# Patient Record
Sex: FEMALE | Race: WHITE | NOT HISPANIC OR LATINO | Employment: OTHER | URBAN - METROPOLITAN AREA
[De-identification: names, ages, dates, MRNs, and addresses within clinical notes are randomized per-mention and may not be internally consistent; named-entity substitution may affect disease eponyms.]

---

## 2017-01-04 ENCOUNTER — TELEPHONE (OUTPATIENT)
Dept: OBGYN | Facility: CLINIC | Age: 72
End: 2017-01-04

## 2017-01-04 NOTE — TELEPHONE ENCOUNTER
Panel Management Review      Patient has the following on her problem list: None      Composite cancer screening  Chart review shows that this patient is due/due soon for the following Mammogram  Summary:    Patient is due/failing the following:   MAMMOGRAM    Action needed:   mammogram    Type of outreach:    Sent letter.    Questions for provider review:    None                                                                                                                                    Sandra Kenney

## 2017-01-10 DIAGNOSIS — G62.9 NEUROPATHY: Primary | ICD-10-CM

## 2017-01-10 DIAGNOSIS — E78.5 HYPERLIPIDEMIA LDL GOAL <130: ICD-10-CM

## 2017-01-11 NOTE — TELEPHONE ENCOUNTER
Routing refill request to provider for review/approval because:  Labs not current:  Last K+ and Cr = July of 2015.      Tino Claros [facility] Pharmacist on behalf of: [facility]

## 2017-01-11 NOTE — TELEPHONE ENCOUNTER
hctz       Last Written Prescription Date: 7-21-16  Last Fill Quantity: 90, # refills: 1  Last Office Visit with [facility], [facility] or [facility] prescribing provider: 9-21-16       POTASSIUM   Date Value Ref Range Status   07/31/2015 4.0 3.4 - 5.3 mmol/L Final     CREATININE   Date Value Ref Range Status   07/31/2015 0.78 0.52 - 1.04 mg/dL Final     BP Readings from Last 3 Encounters:   10/11/16 118/65   09/21/16 112/62   06/20/16 116/65     Atenolol     Last Written Prescription Date: 7-21-16  Last Fill Quantity: 90  # refills: 1    Last Office Visit with [facility], [facility] or [facility] prescribing provider:  9-21-16   Future Office Visit:        BP Readings from Last 3 Encounters:   10/11/16 118/65   09/21/16 112/62   06/20/16 116/65

## 2017-01-13 RX ORDER — ATENOLOL 25 MG/1
25 TABLET ORAL DAILY
Qty: 90 TABLET | Refills: 0 | Status: SHIPPED | OUTPATIENT
Start: 2017-01-13 | End: 2017-03-27

## 2017-01-13 RX ORDER — HYDROCHLOROTHIAZIDE 25 MG/1
25 TABLET ORAL DAILY
Qty: 90 TABLET | Refills: 0 | Status: SHIPPED | OUTPATIENT
Start: 2017-01-13 | End: 2017-05-09

## 2017-02-09 ENCOUNTER — TELEPHONE (OUTPATIENT)
Dept: NEUROLOGY | Facility: CLINIC | Age: 72
End: 2017-02-09

## 2017-02-09 NOTE — TELEPHONE ENCOUNTER
Reason for Call:  Other prescription    Detailed comments: pt stating she has been taking the gabapentin, she would like to increase the medication has her neuropathy is acting up the last 2 weeks. She has warm feet today and especially at night.     Phone Number Patient can be reached at: Home number on file [phone] (home)    Best Time: any     Can we leave a detailed message on this number? YES    Call taken on 2/9/2017 at 10:27 AM by Apryl Grant

## 2017-02-09 NOTE — TELEPHONE ENCOUNTER
Advised follow up is overdue so assisted in scheduling appt to follow up on current worsening Neuropathy status.  Dorcas Sneed RN  [facility]

## 2017-02-24 ENCOUNTER — TELEPHONE (OUTPATIENT)
Dept: FAMILY MEDICINE | Facility: CLINIC | Age: 72
End: 2017-02-24

## 2017-02-24 NOTE — TELEPHONE ENCOUNTER
.  Panel Management Review      Patient has the following on her problem list:       IVD   ASA: Passed    Last LDL:    Lab Results   Component Value Date    CHOL 173 01/25/2016     Lab Results   Component Value Date    HDL 60 01/25/2016     Lab Results   Component Value Date    LDL 90 01/25/2016     Lab Results   Component Value Date    TRIG 117 01/25/2016      No results found for: CHOLHDLRATIO     Is the patient on a Statin? YES   Is the patient on Aspirin? YES                  Medications     HMG CoA Reductase Inhibitors    atorvastatin (LIPITOR) 40 MG tablet    Salicylates    aspirin 81 MG tablet          Last three blood pressure readings:  BP Readings from Last 3 Encounters:   10/11/16 118/65   09/21/16 112/62   06/20/16 116/65        Tobacco History:     History   Smoking Status     Former Smoker     Packs/day: 0.50     Types: Cigarettes     Quit date: 7/4/2007   Smokeless Tobacco     Never Used       Hypertension   Last three blood pressure readings:  BP Readings from Last 3 Encounters:   10/11/16 118/65   09/21/16 112/62   06/20/16 116/65     Blood pressure: Passed    HTN Guidelines:  Age 18-59 BP range:  Less than 140/90  Age 60-85 with Diabetes:  Less than 140/90  Age 60-85 without Diabetes:  less than 150/90      Composite cancer screening  Chart review shows that this patient is due/due soon for the following Colonoscopy and Fecal Colorectal (FIT) - colonoscopy was refused 7/31/15  Summary:    Patient is due/failing the following:   Colon/FIT, pneumovax    Action needed:   Patient needs nurse only appointment. and Patient needs referral/order: colonoscopy    Type of outreach:    Sent letter.    Questions for provider review:    None                                                                                                                                    Ya VARGHESE       Chart routed to none .

## 2017-02-24 NOTE — LETTER
[facility]  [address] [address]  [phone]      February 24, 2017      Abi Lees  [address] [address]        Dear Abi,     As part of [place]'s commitment to health and wellness we have recently reviewed your chart and your medical record indicates that you are due for one or more of the following:    -- Pneumonia vaccine. This is either recommended for your age group or due to a chronic illness that you may have. To receive this vaccine please call to schedule a nurse appointment or, if it is more convenient for you, our pharmacy takes walk ins for this also.     -- Colonoscopy or FIT test. Please call one of the following numbers to schedule a colonoscopy:  [facility] [phone]  [facility] [phone]  [facility] [phone]  [facility] [phone] (multiple sites, call for locations)    A colonoscopy is the gold standard but if you are reluctant to do this there is a less invasive and less expensive alternative. FIT (fecal immunochemical testing) is a screening option that is performed on a yearly basis. This is a test to check for blood in the stool, which can be performed in the comfort of your own home. If you are willing to perform this test, we can order the kit for you to  at our lab. When you have completed the test, you simply mail it in the pre-addressed envelope.    Please call us at [phone] if you need an order for a colonoscopy or FIT testing.    Please try to schedule and/or complete the tests above within the next 2-4 weeks.   The number to call to schedule an appointment at [facility] is [phone].    While we work hard to maintain accurate records on all our patients, it is always possible that this notice does not accurately reflect tests that you may have had. To ensure that we do not continue to send you notices please verify, at your next visit or by a [employer] message,  that we have accurate dates of your tests, even if these were done many years ago.                 Working together for your health is our goal.    Thank you for choosing [facility] for your healthcare needs.      Sincerely,     MELODIE Sarmiento / adam

## 2017-02-24 NOTE — LETTER
[facility]  [address] [place] [address]  [phone]      February 24, 2017      Abi Lees  [address] [address]        Dear Abi,     As part of [place]'s commitment to health and wellness we have recently reviewed your chart and your medical record indicates that you are due for one or more of the following:    -- Pneumonia vaccine. This is either recommended for your age group or due to a chronic illness that you may have. To receive this vaccine please call to schedule a nurse appointment or, if it is more convenient for you, our pharmacy takes walk ins for this also.     Please try to schedule and/or complete the tests above within the next 2-4 weeks.   The number to call to schedule an appointment at [facility] is [phone].    While we work hard to maintain accurate records on all our patients, it is always possible that this notice does not accurately reflect tests that you may have had. To ensure that we do not continue to send you notices please verify, at your next visit or by a [employer] message, that we have accurate dates of your tests, even if these were done many years ago.     Working together for your health is our goal.    Thank you for choosing [facility] for your healthcare needs.      Sincerely,     MELODIE Sarmiento / adam

## 2017-03-27 ENCOUNTER — OFFICE VISIT (OUTPATIENT)
Dept: NEUROLOGY | Facility: CLINIC | Age: 72
End: 2017-03-27
Payer: COMMERCIAL

## 2017-03-27 VITALS
HEIGHT: 64 IN | BODY MASS INDEX: 25.71 KG/M2 | SYSTOLIC BLOOD PRESSURE: 124 MMHG | WEIGHT: 150.6 LBS | DIASTOLIC BLOOD PRESSURE: 71 MMHG | HEART RATE: 66 BPM

## 2017-03-27 DIAGNOSIS — E78.5 HYPERLIPIDEMIA LDL GOAL <130: ICD-10-CM

## 2017-03-27 DIAGNOSIS — G62.9 SENSORY NEUROPATHY: ICD-10-CM

## 2017-03-27 DIAGNOSIS — Z79.899 ENCOUNTER FOR LONG-TERM (CURRENT) USE OF MEDICATIONS: Primary | ICD-10-CM

## 2017-03-27 DIAGNOSIS — G62.9 NEUROPATHY: ICD-10-CM

## 2017-03-27 PROCEDURE — 99214 OFFICE O/P EST MOD 30 MIN: CPT | Performed by: PSYCHIATRY & NEUROLOGY

## 2017-03-27 RX ORDER — GABAPENTIN 300 MG/1
CAPSULE ORAL
Qty: 120 CAPSULE | Refills: 5 | Status: SHIPPED | OUTPATIENT
Start: 2017-03-27 | End: 2017-10-06

## 2017-03-27 NOTE — PROGRESS NOTES
ESTABLISHED PATIENT NEUROLOGY NOTE    DATE OF VISIT: 3/27/2017  MRN: [id number]  PATIENT NAME: Abi Lees  YOB: 1945    No chief complaint on file.    SUBJECTIVE:                                                      HISTORY OF PRESENT ILLNESS:  Abi is here for follow up regarding several-year history of burning/tingling in the feet and legs. She also complained of occasional tingling in the Right hand. 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 The patient had not been wearing her wrist splint regularly at night so she was instructed to do so.    At our previous visit, the patient was complaining of new back pain so I ordered a lumbar MRI, which has not been completed. Electromyogram showed no evidence of radiculopathy or large fiber neuropathy. She did note benefit from a pain standpoint on the gabapentin.     There is history of elevated ANASTASIA and unremarkable work-up for possible erythromelalgia.     Today the patient returns to clinic to discuss symptom management. She has been having pain and redness return in the feet during the afternoons primarily. She does feel that the gabapentin continues to be helpful, but it seems to wear off later in the day. She continues to be active with walking. She has not noticed any weakness. She has a fan on the feet at night and sometimes takes ibuprofen which also helps. She denies side effects from the gabapentin. She does not have history of kidney problems.     She does have occasional tingling in the hands (for instance, if she is holding the telephone in one position for too long). She denies pain but does have some new trouble with twisting/gripping motions due to arthritis in the hands and fingers. She has not been wearing the wrist splint at night for months because she does not feel that she needs it.    CURRENT MEDICATIONS:     Current Outpatient Prescriptions on File Prior to Visit:  hydrochlorothiazide (HYDRODIURIL) 25 MG tablet Take 1 tablet  (25 mg) by mouth daily Due for appointment before next refill and labs   atenolol (TENORMIN) 25 MG tablet Take 1 tablet (25 mg) by mouth daily Due for appointment before next refill and labs   gabapentin (NEURONTIN) 300 MG capsule Take 1 capsule (300 mg) by mouth 3 times daily   lisinopril (PRINIVIL,ZESTRIL) 40 MG tablet Take 1 tablet (40 mg) by mouth daily   atorvastatin (LIPITOR) 40 MG tablet Take 1 tablet (40 mg) by mouth daily   order for DME Equipment being ordered: Right wrist splint (carpal tunnel syndrome)   aspirin 81 MG tablet Take  by mouth daily.     No current facility-administered medications on file prior to visit.       REVIEW OF SYSTEMS:                                                      10-point review of systems is negative except as mentioned above in HPI.     EXAM:                                                      Physical Exam:   Vitals: There were no vitals taken for this visit.  BMI= There is no height or weight on file to calculate BMI.  GENERAL: NAD.   Neurologic:  MENTAL STATUS: Alert, attentive. Speech is fluent. Normal comprehension. Normal concentration. Adequate fund of knowledge.   CRANIAL NERVES: Pupils equally, round and reactive to light. Facial movement normal. EOM full. Hearing intact to conversation. Trapezius strength intact. Palate moves symmetrically. Tongue midline.  MOTOR: 5/5 in proximal and distal muscle groups of upper extremities with focused testing. Tone and bulk normal. Negative Tinel test and Phalen maneuver.  SENSATION: Normal light touch and pinprick in the fingers. Intact proprioception. Vibration: Slightly decreased at both ankles.   DTRs: Brisk at patellae. Absent ankle jerks.  COORDINATION: Normal fine motor movements.  STATION AND GAIT: Slightly antalgic.  CV: RRR. S1, S2.   NECK: No bruits.  EXTR: Feet are warm, red.       ASSESSMENT and PLAN:                                                      Assessment and Plan:  No diagnosis found.     Ms. Lees is  a pleasant 70 yo woman with chronic problems with paresthesias and pain in the LEs. There has been question of whether her symptoms are due to erythromelalgia vs small fiber neuropathy. She has responded to gabapentin in the past but has now been experiencing some breakthrough pain and discomfort in the afternoons. We discussed increasing the dose to see if we can maximize benefit. I also mentioned that if this is not effective, we may have to try either an adjunctive or alternative medication. The patient understands and agrees with the plan.     I am curious about her reflexes and think that there may also be underlying myelopathy, though she is not symptomatic. She prefers to hold off on lumbar imaging for now.     Patient Instructions:  Increase the afternoon gabapentin dose to 600mg, as discussed (continue 300mg in the morning and 300mg in the evening). It is okay to increase the other doses as well, as needed. Let me know if you find the need to make further changes.   Labs: BMP (for kidney function, at your leisure).   Return to clinic in 6 months, or sooner if new concerns arise.    Total Time: 25 minutes were spent with the patient. More than 50% of the time spent on counseling (as described above in Assessment and Plan) /coordinating the care.    Salma Stovall MD  Neurology

## 2017-03-27 NOTE — MR AVS SNAPSHOT
After Visit Summary   3/27/2017    Abi Lees    MRN: [id number]           Patient Information     Date Of Birth          1945        Visit Information        Provider Department      3/27/2017 3:00 PM Salma Stovall MD [facility]        Today's Diagnoses     Encounter for long-term (current) use of medications    -  1    Sensory neuropathy (H)          Care Instructions    Plan:    Increase the afternoon gabapentin dose to 600mg, as discussed (continue 300mg in the morning and 300mg in the evening). It is okay to increase the other doses as well, as needed. Let me know if you find the need to make further changes.   Labs: BMP (for kidney function, at your leisure).   Return to clinic in 6 months, or sooner if new concerns arise.        Follow-ups after your visit        Follow-up notes from your care team     Return in about 6 months (around 9/27/2017).      Future tests that were ordered for you today     Open Future Orders        Priority Expected Expires Ordered    Basic metabolic panel Routine  3/27/2018 3/27/2017            Who to contact     If you have questions or need follow up information about today's clinic visit or your schedule please contact [facility] directly at [phone].  Normal or non-critical lab and imaging results will be communicated to you by MyChart, letter or phone within 4 business days after the clinic has received the results. If you do not hear from us within 7 days, please contact the clinic through [employer] or phone. If you have a critical or abnormal lab result, we will notify you by phone as soon as possible.  Submit refill requests through [employer] or call your pharmacy and they will forward the refill request to us. Please allow 3 business days for your refill to be completed.          Additional Information About Your Visit        [employer] Information     [employer] lets you send messages to your doctor, view your test results,  "renew your prescriptions, schedule appointments and more. To sign up, go to [email or web address] . Click on \"Log in\" on the left side of the screen, which will take you to the Welcome page. Then click on \"Sign up Now\" on the right side of the page.     You will be asked to enter the access code listed below, as well as some personal information. Please follow the directions to create your username and password.     Your access code is: [email or web address]  Expires: 2017  3:25 PM     Your access code will  in 90 days. If you need help or a new code, please call your [place] clinic or [phone].        Care EveryWhere ID     This is your Care EveryWhere ID. This could be used by other organizations to access your [place] medical records  [id number]        Your Vitals Were     Pulse Height BMI (Body Mass Index)             66 5' 3.5\" (1.613 m) 26.26 kg/m2          Blood Pressure from Last 3 Encounters:   17 124/71   10/11/16 118/65   16 112/62    Weight from Last 3 Encounters:   17 150 lb 9.6 oz (68.3 kg)   10/11/16 150 lb 12.8 oz (68.4 kg)   16 151 lb 3.2 oz (68.6 kg)                 Today's Medication Changes          These changes are accurate as of: 3/27/17  3:25 PM.  If you have any questions, ask your nurse or doctor.               These medicines have changed or have updated prescriptions.        Dose/Directions    gabapentin 300 MG capsule   Commonly known as:  NEURONTIN   This may have changed:    - how much to take  - how to take this  - when to take this  - additional instructions   Used for:  Sensory neuropathy (H)   Changed by:  Salma Stovall MD        300mg Qam, 600mg at 2pm and 300mg QHS.   Quantity:  120 capsule   Refills:  5            Where to get your medicines      These medications were sent to [facility] [address]  [address] [facility] [address]     Phone:  [phone]     gabapentin 300 MG capsule    "             Primary Care Provider Office Phone # Fax #    Abigail Mishra EMORY [facility] [phone]       [facility] [address] [place] [address]        Thank you!     Thank you for choosing [facility]  for your care. Our goal is always to provide you with excellent care. Hearing back from our patients is one way we can continue to improve our services. Please take a few minutes to complete the written survey that you may receive in the mail after your visit with us. Thank you!             Your Updated Medication List - Protect others around you: Learn how to safely use, store and throw away your medicines at www.disposemymeds.org.          This list is accurate as of: 3/27/17  3:25 PM.  Always use your most recent med list.                   Brand Name Dispense Instructions for use    aspirin 81 MG tablet      Take  by mouth daily.       atenolol 25 MG tablet    TENORMIN    90 tablet    Take 1 tablet (25 mg) by mouth daily Due for appointment before next refill and labs       atorvastatin 40 MG tablet    LIPITOR    90 tablet    Take 1 tablet (40 mg) by mouth daily       gabapentin 300 MG capsule    NEURONTIN    120 capsule    300mg Qam, 600mg at 2pm and 300mg QHS.       hydrochlorothiazide 25 MG tablet    HYDRODIURIL    90 tablet    Take 1 tablet (25 mg) by mouth daily Due for appointment before next refill and labs       lisinopril 40 MG tablet    PRINIVIL/ZESTRIL    90 tablet    Take 1 tablet (40 mg) by mouth daily       order for DME     1 Units    Equipment being ordered: Right wrist splint (carpal tunnel syndrome)

## 2017-03-27 NOTE — TELEPHONE ENCOUNTER
Routing refill request to provider for review/approval because:  Drug not on the [facility] refill protocol. Due for appt and labs per last refill in January.     Kaylee Rivera, Pharm.D.  on behalf of [facility] Pharmacist   [email or web address]

## 2017-03-27 NOTE — PATIENT INSTRUCTIONS
Plan:    Increase the afternoon gabapentin dose to 600mg, as discussed (continue 300mg in the morning and 300mg in the evening). It is okay to increase the other doses as well, as needed. Let me know if you find the need to make further changes.   Labs: BMP (for kidney function, at your leisure).   Return to clinic in 6 months, or sooner if new concerns arise.

## 2017-03-27 NOTE — TELEPHONE ENCOUNTER
atorvastatin  Last Written Prescription Date: 7-  Last Fill Quantity: 90, # refills: 2    Last Office Visit with [facility] or [facility] prescribing provider:  3-   Future Office Visit:      Cholesterol   Date Value Ref Range Status   01/25/2016 173 <200 mg/dL Final     HDL Cholesterol   Date Value Ref Range Status   01/25/2016 60 >49 mg/dL Final     LDL Cholesterol Calculated   Date Value Ref Range Status   01/25/2016 90 <100 mg/dL Final     Comment:     Desirable:       <100 mg/dl     Triglycerides   Date Value Ref Range Status   01/25/2016 117 <150 mg/dL Final

## 2017-03-27 NOTE — NURSING NOTE
"Initial /71  Pulse 66  Ht 5' 3.5\" (1.613 m)  Wt 150 lb 9.6 oz (68.3 kg)  BMI 26.26 kg/m2 Estimated body mass index is 26.26 kg/(m^2) as calculated from the following:    Height as of this encounter: 5' 3.5\" (1.613 m).    Weight as of this encounter: 150 lb 9.6 oz (68.3 kg). .      "

## 2017-03-27 NOTE — TELEPHONE ENCOUNTER
Atenolol 25mg      Last Written Prescription Date: 01/13/17  Last Fill Quantity: 90, # refills: 0    Last Office Visit with [facility] or [facility] prescribing provider:  09/21/16   Future Office Visit:        BP Readings from Last 3 Encounters:   03/27/17 124/71   10/11/16 118/65   09/21/16 112/62     Thank you -  Jessica Breen, Pharmacy Technician  [facility]  On Behalf Of [facility]

## 2017-03-28 RX ORDER — ATENOLOL 25 MG/1
25 TABLET ORAL DAILY
Qty: 90 TABLET | Refills: 0 | Status: SHIPPED | OUTPATIENT
Start: 2017-03-28 | End: 2017-07-10

## 2017-03-28 RX ORDER — ATORVASTATIN CALCIUM 40 MG/1
40 TABLET, FILM COATED ORAL DAILY
Qty: 30 TABLET | Refills: 0 | Status: SHIPPED | OUTPATIENT
Start: 2017-03-28 | End: 2017-05-09

## 2017-04-06 DIAGNOSIS — Z79.899 ENCOUNTER FOR LONG-TERM (CURRENT) USE OF MEDICATIONS: ICD-10-CM

## 2017-04-06 DIAGNOSIS — E78.5 HYPERLIPIDEMIA LDL GOAL <130: ICD-10-CM

## 2017-04-06 LAB
ANION GAP SERPL CALCULATED.3IONS-SCNC: 9 MMOL/L (ref 3–14)
BUN SERPL-MCNC: 17 MG/DL (ref 7–30)
CALCIUM SERPL-MCNC: 9.3 MG/DL (ref 8.5–10.1)
CHLORIDE SERPL-SCNC: 101 MMOL/L (ref 94–109)
CHOLEST SERPL-MCNC: 153 MG/DL
CO2 SERPL-SCNC: 28 MMOL/L (ref 20–32)
CREAT SERPL-MCNC: 0.8 MG/DL (ref 0.52–1.04)
GFR SERPL CREATININE-BSD FRML MDRD: 71 ML/MIN/1.7M2
GLUCOSE SERPL-MCNC: 93 MG/DL (ref 70–99)
HDLC SERPL-MCNC: 54 MG/DL
LDLC SERPL CALC-MCNC: 73 MG/DL
NONHDLC SERPL-MCNC: 99 MG/DL
POTASSIUM SERPL-SCNC: 3.9 MMOL/L (ref 3.4–5.3)
SODIUM SERPL-SCNC: 138 MMOL/L (ref 133–144)
TRIGL SERPL-MCNC: 132 MG/DL

## 2017-04-06 PROCEDURE — 80061 LIPID PANEL: CPT | Performed by: PSYCHIATRY & NEUROLOGY

## 2017-04-06 PROCEDURE — 80048 BASIC METABOLIC PNL TOTAL CA: CPT | Performed by: PSYCHIATRY & NEUROLOGY

## 2017-04-06 PROCEDURE — 36415 COLL VENOUS BLD VENIPUNCTURE: CPT | Performed by: PSYCHIATRY & NEUROLOGY

## 2017-04-06 NOTE — LETTER
[facility]  [address] [address]  Phone: [phone]      April 7, 2017      Abi Kunde  [address] [address]          Dear Ms. Lees,    The results of your recent lab tests were within normal limits. Enclosed is a copy of these results.  Results for orders placed or performed in visit on 04/06/17   Basic metabolic panel   Result Value Ref Range    Sodium 138 133 - 144 mmol/L    Potassium 3.9 3.4 - 5.3 mmol/L    Chloride 101 94 - 109 mmol/L    Carbon Dioxide 28 20 - 32 mmol/L    Anion Gap 9 3 - 14 mmol/L    Glucose 93 70 - 99 mg/dL    Urea Nitrogen 17 7 - 30 mg/dL    Creatinine 0.80 0.52 - 1.04 mg/dL    GFR Estimate 71 >60 mL/min/1.7m2    GFR Estimate If Black 86 >60 mL/min/1.7m2    Calcium 9.3 8.5 - 10.1 mg/dL   Lipid Profile with reflex to direct LDL   Result Value Ref Range    Cholesterol 153 <200 mg/dL    Triglycerides 132 <150 mg/dL    HDL Cholesterol 54 >49 mg/dL    LDL Cholesterol Calculated 73 <100 mg/dL    Non HDL Cholesterol 99 <130 mg/dL     If you have any further questions or problems, please contact our office.    Sincerely,      MELODIE Sarmiento/ marc

## 2017-04-07 NOTE — PROGRESS NOTES
Please advise Abi Lees,  1945, that her lab results were normal.  [phone] (home) NONE (work)    Thank you,  Salma Stovall

## 2017-05-09 DIAGNOSIS — E78.5 HYPERLIPIDEMIA LDL GOAL <130: ICD-10-CM

## 2017-05-09 DIAGNOSIS — G62.9 NEUROPATHY: ICD-10-CM

## 2017-05-09 RX ORDER — HYDROCHLOROTHIAZIDE 25 MG/1
25 TABLET ORAL DAILY
Qty: 90 TABLET | Refills: 0 | Status: SHIPPED | OUTPATIENT
Start: 2017-05-09 | End: 2017-07-24

## 2017-05-09 RX ORDER — ATORVASTATIN CALCIUM 40 MG/1
40 TABLET, FILM COATED ORAL DAILY
Qty: 90 TABLET | Refills: 0 | Status: SHIPPED | OUTPATIENT
Start: 2017-05-09 | End: 2017-07-24

## 2017-05-09 NOTE — TELEPHONE ENCOUNTER
Atorvastatin 40mg  Last Written Prescription Date: 3/28/17  Last Fill Quantity: 30, # refills: 0  Last Office Visit with [facility], [facility] or  [facility] prescribing provider: 9/21/16       Lab Results   Component Value Date    CHOL 153 04/06/2017     Lab Results   Component Value Date    HDL 54 04/06/2017     Lab Results   Component Value Date    LDL 73 04/06/2017     Lab Results   Component Value Date    TRIG 132 04/06/2017     No results found for: CHOLHDLRATIO      Hydrochlorothiazide 25mg      Last Written Prescription Date: 1/13/17  Last Fill Quantity: 90, # refills: 0  Last Office Visit with [facility], [facility] or [facility] prescribing provider: 9/21/16       Potassium   Date Value Ref Range Status   04/06/2017 3.9 3.4 - 5.3 mmol/L Final     Creatinine   Date Value Ref Range Status   04/06/2017 0.80 0.52 - 1.04 mg/dL Final     BP Readings from Last 3 Encounters:   03/27/17 124/71   10/11/16 118/65   09/21/16 112/62     Vale Ayers CPhT  [facility] [id number] [address] [place] [address]  Phone: [phone]  Fax: [phone]

## 2017-05-09 NOTE — TELEPHONE ENCOUNTER
Prescription approved per [facility] Refill Protocol.  Kaylee Rivera PharmD   [facility]  [email or web address]   [facility] pharmacist on behalf of [facility].

## 2017-07-10 DIAGNOSIS — G62.9 NEUROPATHY: ICD-10-CM

## 2017-07-10 DIAGNOSIS — E78.5 HYPERLIPIDEMIA LDL GOAL <130: ICD-10-CM

## 2017-07-10 NOTE — TELEPHONE ENCOUNTER
Atenolol 25mg      Last Written Prescription Date: 03/28/2017 #90 x 0  Last filled 03/28/2017  Last Office Visit with [facility], [facility] or [facility] prescribing provider:  09/21/2016 ALEENA Stevens   Future Office Visit:   none     BP Readings from Last 3 Encounters:   03/27/17 124/71   10/11/16 118/65   09/21/16 112/62

## 2017-07-11 RX ORDER — ATENOLOL 25 MG/1
TABLET ORAL
Qty: 14 TABLET | Refills: 0 | Status: SHIPPED | OUTPATIENT
Start: 2017-07-11 | End: 2017-07-24

## 2017-07-11 NOTE — TELEPHONE ENCOUNTER
Spoke with patient and will give 2 weeks for refill. She has an appointment on 7/24/17.  Tessa Thompson RN

## 2017-07-11 NOTE — TELEPHONE ENCOUNTER
Pt called looking for a refill on her medication, she has appt set up in two weeks but only has five days left.  Please advise.     Anisa Villatoro, [place]

## 2017-07-19 ENCOUNTER — TELEPHONE (OUTPATIENT)
Dept: OBGYN | Facility: CLINIC | Age: 72
End: 2017-07-19

## 2017-07-19 NOTE — LETTER
July 19, 2017      Abi Lees  [address] [address]    Dear ,      This letter is to remind you that you are due for your follow up mammogram.    Please call [phone] to schedule your appointment at your earliest convenience.     If you have completed the tests outside of [place], please have the results forwarded to our office. We will update the chart for your primary Physician to review before your next annual physical.     Sincerely,      Bj Mackenzie MD

## 2017-07-19 NOTE — TELEPHONE ENCOUNTER
Panel Management Review      Patient has the following on her problem list: None      Composite cancer screening  Chart review shows that this patient is due/due soon for the following Mammogram  Summary:    Patient is due/failing the following:   MAMMOGRAM    Action needed:   Patient needs office visit for mammogram.    Type of outreach:    Sent letter.    Questions for provider review:    None                                                                                                                                    MELINA DO MA       Chart routed to none .

## 2017-07-24 ENCOUNTER — OFFICE VISIT (OUTPATIENT)
Dept: FAMILY MEDICINE | Facility: CLINIC | Age: 72
End: 2017-07-24
Payer: COMMERCIAL

## 2017-07-24 VITALS
WEIGHT: 151 LBS | HEART RATE: 60 BPM | BODY MASS INDEX: 26.33 KG/M2 | TEMPERATURE: 98.4 F | DIASTOLIC BLOOD PRESSURE: 64 MMHG | SYSTOLIC BLOOD PRESSURE: 116 MMHG

## 2017-07-24 DIAGNOSIS — E78.5 HYPERLIPIDEMIA LDL GOAL <130: ICD-10-CM

## 2017-07-24 DIAGNOSIS — I10 ESSENTIAL HYPERTENSION WITH GOAL BLOOD PRESSURE LESS THAN 140/90: ICD-10-CM

## 2017-07-24 DIAGNOSIS — H93.13 BILATERAL TINNITUS: Primary | ICD-10-CM

## 2017-07-24 DIAGNOSIS — G62.9 NEUROPATHY: ICD-10-CM

## 2017-07-24 PROCEDURE — 99214 OFFICE O/P EST MOD 30 MIN: CPT | Performed by: NURSE PRACTITIONER

## 2017-07-24 RX ORDER — ATENOLOL 25 MG/1
TABLET ORAL
Qty: 90 TABLET | Refills: 3 | Status: SHIPPED | OUTPATIENT
Start: 2017-07-24 | End: 2018-07-09

## 2017-07-24 RX ORDER — HYDROCHLOROTHIAZIDE 25 MG/1
25 TABLET ORAL DAILY
Qty: 90 TABLET | Refills: 3 | Status: SHIPPED | OUTPATIENT
Start: 2017-07-24 | End: 2018-07-09

## 2017-07-24 RX ORDER — ATORVASTATIN CALCIUM 40 MG/1
40 TABLET, FILM COATED ORAL DAILY
Qty: 90 TABLET | Refills: 3 | Status: SHIPPED | OUTPATIENT
Start: 2017-07-24 | End: 2018-07-09

## 2017-07-24 NOTE — MR AVS SNAPSHOT
After Visit Summary   7/24/2017    Abi Lees    MRN: [id number]           Patient Information     Date Of Birth          1945        Visit Information        Provider Department      7/24/2017 9:20 AM Abigail Stevens APRN [facility]        Today's Diagnoses     Bilateral tinnitus    -  1    Neuropathy        Hyperlipidemia LDL goal <130        Essential hypertension with goal blood pressure less than 140/90          Care Instructions    Your nose is stuffy . Start to use the neti pot     This will decrease the swelling in the nose  And will ususally decrease any post nasal drainage.     Stay well hydrated - urine should be clear.        For the knee  - consider getting some  Kinesiology tape.    Rock tape or  Ktape  Try doing 1/2 squats while brushing   Getting your thigh muscles strong will help the knees  Check out the [employer]neakers.       If the ringing in the ears doesn't improve.  Get your hearing checked and then see ENT               Follow-ups after your visit        Additional Services     AUDIOLOGY ADULT REFERRAL       Your provider has referred you to: [facility]: [facility] [phone]   [email or web address]  [facility] [phone]   [email or web address]    Specialty Testing:  Audiogram w/Tymps and Reflexes (Comprehensive Audiology Evaluation)            OTOLARYNGOLOGY REFERRAL       Your provider has referred you to: [facility]: [facility] [phone]   [email or web address]  [facility]: [facility] [phone]   [email or web address]    Please be aware that coverage of these services is subject to the terms and limitations of your health insurance plan.  Call member services at your health plan with any benefit or coverage questions.      Please bring the following  "with you to your appointment:    (1) Any X-Rays, CTs or MRIs which have been performed.  Contact the facility where they were done to arrange for  prior to your scheduled appointment.   (2) List of current medications  (3) This referral request   (4) Any documents/labs given to you for this referral                  Who to contact     Normal or non-critical lab and imaging results will be communicated to you by [employer], letter or phone within 4 business days after the clinic has received the results. If you do not hear from us within 7 days, please contact the clinic through [employer] or phone. If you have a critical or abnormal lab result, we will notify you by phone as soon as possible.  Submit refill requests through [employer] or call your pharmacy and they will forward the refill request to us. Please allow 3 business days for your refill to be completed.          If you need to speak with a  for additional information , please call: [phone]           Additional Information About Your Visit        PageFair Information     Page[employer] lets you send messages to your doctor, view your test results, renew your prescriptions, schedule appointments and more. To sign up, go to [email or web address] . Click on \"Log in\" on the left side of the screen, which will take you to the Welcome page. Then click on \"Sign up Now\" on the right side of the page.     You will be asked to enter the access code listed below, as well as some personal information. Please follow the directions to create your username and password.     Your access code is: [email or web address]  Expires: 10/22/2017 10:10 AM     Your access code will  in 90 days. If you need help or a new code, please call your [place] clinic or [phone].        Care EveryWhere ID     This is your Care EveryWhere ID. This could be used by other organizations to access your [place] medical records  [id number]        Your Vitals Were     Pulse " Temperature BMI (Body Mass Index)             60 98.4  F (36.9  C) (Tympanic) 26.33 kg/m2          Blood Pressure from Last 3 Encounters:   07/24/17 116/64   03/27/17 124/71   10/11/16 118/65    Weight from Last 3 Encounters:   07/24/17 151 lb (68.5 kg)   03/27/17 150 lb 9.6 oz (68.3 kg)   10/11/16 150 lb 12.8 oz (68.4 kg)              We Performed the Following     Albumin Random Urine Quantitative     AUDIOLOGY ADULT REFERRAL     OTOLARYNGOLOGY REFERRAL          Today's Medication Changes          These changes are accurate as of: 7/24/17 10:10 AM.  If you have any questions, ask your nurse or doctor.               These medicines have changed or have updated prescriptions.        Dose/Directions    atenolol 25 MG tablet   Commonly known as:  TENORMIN   This may have changed:  See the new instructions.   Used for:  Neuropathy (H), Hyperlipidemia LDL goal <130   Changed by:  Abigail Stevens APRN CNP        TAKE ONE TABLET BY MOUTH EVERY DAY   Quantity:  90 tablet   Refills:  3            Where to get your medicines      These medications were sent to [facility] [address] [address]     Phone:  [phone]     atenolol 25 MG tablet    atorvastatin 40 MG tablet    hydrochlorothiazide 25 MG tablet                Primary Care Provider Office Phone # Fax #    EMORY Webb [phone]       [address] [address]        Equal Access to Services     ABRAHAN REHMAN AH: Hadii india ku hadasho Soomaali, waaxda luqadaha, qaybta kaalmada adeegyada, prosper idimarialuisa edmonds. So [facility] [phone].    ATENCIÓN: Si habla español, tiene a isidro disposición servicios gratuitos de asistencia lingüística. Llame al [phone].    We comply with applicable federal civil rights laws and [place] laws. We do not discriminate on the basis of race, color, national origin,  age, disability sex, sexual orientation or gender identity.            Thank you!     Thank you for choosing [facility]  for your care. Our goal is always to provide you with excellent care. Hearing back from our patients is one way we can continue to improve our services. Please take a few minutes to complete the written survey that you may receive in the mail after your visit with us. Thank you!             Your Updated Medication List - Protect others around you: Learn how to safely use, store and throw away your medicines at www.disposemymeds.org.          This list is accurate as of: 7/24/17 10:10 AM.  Always use your most recent med list.                   Brand Name Dispense Instructions for use Diagnosis    aspirin 81 MG tablet      Take  by mouth daily.    Bilateral foot pain       atenolol 25 MG tablet    TENORMIN    90 tablet    TAKE ONE TABLET BY MOUTH EVERY DAY    Neuropathy (H), Hyperlipidemia LDL goal <130       atorvastatin 40 MG tablet    LIPITOR    90 tablet    Take 1 tablet (40 mg) by mouth daily    Hyperlipidemia LDL goal <130       gabapentin 300 MG capsule    NEURONTIN    120 capsule    300mg Qam, 600mg at 2pm and 300mg QHS.    Sensory neuropathy (H)       hydrochlorothiazide 25 MG tablet    HYDRODIURIL    90 tablet    Take 1 tablet (25 mg) by mouth daily    Neuropathy (H), Hyperlipidemia LDL goal <130       lisinopril 40 MG tablet    PRINIVIL/ZESTRIL    90 tablet    Take 1 tablet (40 mg) by mouth daily    Essential hypertension with goal blood pressure less than 140/90       order for DME     1 Units    Equipment being ordered: Right wrist splint (carpal tunnel syndrome)    Carpal tunnel syndrome of right wrist

## 2017-07-24 NOTE — PROGRESS NOTES
SUBJECTIVE:                                                    Abi Lees is a 71 year old female who presents to clinic today for the following health issues:    *Tinnitus - Has been experiencing bilateral tinnitus over the past couple of months.    No dizziness  Will keep her awake at night     Hyperlipidemia Follow-Up      Rate your low fat/cholesterol diet?: good    Taking statin?  Yes, no muscle aches from statin    Other lipid medications/supplements?:  none    Hypertension Follow-up      Outpatient blood pressures are not being checked.    Low Salt Diet: no added salt    Amount of exercise or physical activity: Walks about 5 days per week    Problems taking medications regularly: No    Medication side effects: none  Diet: regular (no restrictions)          Problem list and histories reviewed & adjusted, as indicated.  Additional history: as documented    Patient Active Problem List   Diagnosis     Bilateral foot pain     Osteoarthritis     Neuropathy (H)     Hyperlipidemia LDL goal <130     Colonoscopy refused     Care refused by patient     Advanced directives, counseling/discussion     Elevated antinuclear antibody (ANASTASIA) level     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     Essential hypertension with goal blood pressure less than 140/90     Past Surgical History:   Procedure Laterality Date     APPENDECTOMY       HYSTERECTOMY      abdominal- bleeding     OOPHORECTOMY      unilateral     OTHER SURGICAL HISTORY      LLE Varicose vein RF ablation at [facility]        Social History   Substance Use Topics     Smoking status: Former Smoker     Packs/day: 0.50     Types: Cigarettes     Quit date: 7/4/2007     Smokeless tobacco: Never Used     Alcohol use 0.0 oz/week     0 Standard drinks or equivalent per week      Comment: rare     History reviewed. No pertinent family history.      Current Outpatient Prescriptions   Medication Sig Dispense Refill     atenolol (TENORMIN) 25 MG tablet TAKE ONE TABLET BY MOUTH EVERY  DAY (DUE FOR APPOINTMENT BEFORE NEXT REFILL AND LABS) 14 tablet 0     atorvastatin (LIPITOR) 40 MG tablet Take 1 tablet (40 mg) by mouth daily 90 tablet 0     hydrochlorothiazide (HYDRODIURIL) 25 MG tablet Take 1 tablet (25 mg) by mouth daily 90 tablet 0     gabapentin (NEURONTIN) 300 MG capsule 300mg Qam, 600mg at 2pm and 300mg QHS. 120 capsule 5     lisinopril (PRINIVIL,ZESTRIL) 40 MG tablet Take 1 tablet (40 mg) by mouth daily 90 tablet 3     order for DME Equipment being ordered: Right wrist splint (carpal tunnel syndrome) 1 Units 0     aspirin 81 MG tablet Take  by mouth daily.       No Known Allergies  BP Readings from Last 3 Encounters:   07/24/17 116/64   03/27/17 124/71   10/11/16 118/65    Wt Readings from Last 3 Encounters:   07/24/17 151 lb (68.5 kg)   03/27/17 150 lb 9.6 oz (68.3 kg)   10/11/16 150 lb 12.8 oz (68.4 kg)                        Reviewed and updated as needed this visit by clinical staff     Reviewed and updated as needed this visit by Provider         ROS:  CONSTITUTIONAL:NEGATIVE for fever, chills, change in weight and did gain over the last year.   Is walking 1 mile per day    ENT/MOUTH: POSITIVE for does have tinnitus x 2 months in both ears.   Did try acupuncture that did  Help a little .   States that her  Ear did pop.  b ut then the  Ringing started again .   Hearing is affected.  Turning up the volume on the TV>    No problems with conversation    R: NEGATIVE for significant cough or SOB  CV: NEGATIVE for chest pain, palpitations or peripheral edema  GI: NEGATIVE for nausea, abdominal pain, heartburn, or change in bowel habits  NEURO: NEGATIVE for weakness, dizziness or paresthesias  PSYCHIATRIC: NEGATIVE for changes in mood or affect    OBJECTIVE:     /64 (BP Location: Left arm, Patient Position: Chair, Cuff Size: Adult Regular)  Pulse 60  Temp 98.4  F (36.9  C) (Tympanic)  Wt 151 lb (68.5 kg)  BMI 26.33 kg/m2  Body mass index is 26.33 kg/(m^2).   GENERAL: healthy, alert  and no distress  HENT: normal cephalic/atraumatic, right ear: occluded with wax, left ear: clear effusion, nasal mucosa edematous , rhinorrhea clear, oropharynx clear, oral mucous membranes moist and sinuses: not tender  NECK: no adenopathy, no asymmetry, masses, or scars and thyroid normal to palpation  RESP: lungs clear to auscultation - no rales, rhonchi or wheezes  CV: regular rate and rhythm, normal S1 S2, no S3 or S4, no murmur, click or rub, no peripheral edema and peripheral pulses strong  NEURO: Normal strength and tone, sensory exam grossly normal, mentation intact, cranial nerves 2-12 intact, , Romberg normal and rapid alternating movements normal    Diagnostic Test Results:  Results for orders placed or performed in visit on 04/06/17   Basic metabolic panel   Result Value Ref Range    Sodium 138 133 - 144 mmol/L    Potassium 3.9 3.4 - 5.3 mmol/L    Chloride 101 94 - 109 mmol/L    Carbon Dioxide 28 20 - 32 mmol/L    Anion Gap 9 3 - 14 mmol/L    Glucose 93 70 - 99 mg/dL    Urea Nitrogen 17 7 - 30 mg/dL    Creatinine 0.80 0.52 - 1.04 mg/dL    GFR Estimate 71 >60 mL/min/1.7m2    GFR Estimate If Black 86 >60 mL/min/1.7m2    Calcium 9.3 8.5 - 10.1 mg/dL   Lipid Profile with reflex to direct LDL   Result Value Ref Range    Cholesterol 153 <200 mg/dL    Triglycerides 132 <150 mg/dL    HDL Cholesterol 54 >49 mg/dL    LDL Cholesterol Calculated 73 <100 mg/dL    Non HDL Cholesterol 99 <130 mg/dL       ASSESSMENT/PLAN:     ASSESSMENT/PLAN:      ICD-10-CM    1. Bilateral tinnitus H93.13 AUDIOLOGY ADULT REFERRAL     OTOLARYNGOLOGY REFERRAL   2. Neuropathy G62.9 atenolol (TENORMIN) 25 MG tablet     hydrochlorothiazide (HYDRODIURIL) 25 MG tablet   3. Hyperlipidemia LDL goal <130 E78.5 atenolol (TENORMIN) 25 MG tablet     atorvastatin (LIPITOR) 40 MG tablet     hydrochlorothiazide (HYDRODIURIL) 25 MG tablet   4. Essential hypertension with goal blood pressure less than 140/90 I10 Albumin Random Urine Quantitative        Patient Instructions   Your nose is stuffy . Start to use the neti pot     This will decrease the swelling in the nose  And will ususally decrease any post nasal drainage.     Stay well hydrated - urine should be clear.        For the knee  - consider getting some  Kinesiology tape.    Rock tape or  Ktape  Try doing 1/2 squats while brushing   Getting your thigh muscles strong will help the knees  Check out the [employer].       If the ringing in the ears doesn't improve.  Get your hearing checked and then see ENT                      MEDICATIONS:  Continue current medications without change  CONSULTATION/REFERRAL to Audiology and  ENT   See Patient Instructions    LESLY CHUNG NP, APRN [facility]

## 2017-07-24 NOTE — NURSING NOTE
Per verbal orders, patient's right ear was irrigated with warm water and colace until free of debris. Patient tolerated procedure well.  Deidre Leiva CMA (AAMA)

## 2017-07-24 NOTE — NURSING NOTE
"Chief Complaint   Patient presents with     Hypertension       Initial /64 (BP Location: Left arm, Patient Position: Chair, Cuff Size: Adult Regular)  Pulse 60  Temp 98.4  F (36.9  C) (Tympanic)  Wt 151 lb (68.5 kg)  BMI 26.33 kg/m2 Estimated body mass index is 26.33 kg/(m^2) as calculated from the following:    Height as of 3/27/17: 5' 3.5\" (1.613 m).    Weight as of this encounter: 151 lb (68.5 kg).  Medication Reconciliation: complete     Deidre Leiva CMA (AAMA)      "

## 2017-07-24 NOTE — PATIENT INSTRUCTIONS
Your nose is stuffy . Start to use the neti pot     This will decrease the swelling in the nose  And will ususally decrease any post nasal drainage.     Stay well hydrated - urine should be clear.        For the knee  - consider getting some  Kinesiology tape.    Rock tape or  Ktape  Try doing 1/2 squats while brushing   Getting your thigh muscles strong will help the knees  Check out the [employer].       If the ringing in the ears doesn't improve.  Get your hearing checked and then see ENT

## 2017-08-01 DIAGNOSIS — I10 ESSENTIAL HYPERTENSION WITH GOAL BLOOD PRESSURE LESS THAN 140/90: ICD-10-CM

## 2017-08-01 LAB
CREAT UR-MCNC: 29 MG/DL
MICROALBUMIN UR-MCNC: <5 MG/L
MICROALBUMIN/CREAT UR: NORMAL MG/G CR (ref 0–25)

## 2017-08-01 PROCEDURE — 82043 UR ALBUMIN QUANTITATIVE: CPT | Performed by: NURSE PRACTITIONER

## 2017-08-24 ENCOUNTER — OFFICE VISIT (OUTPATIENT)
Dept: AUDIOLOGY | Facility: CLINIC | Age: 72
End: 2017-08-24
Payer: COMMERCIAL

## 2017-08-24 ENCOUNTER — OFFICE VISIT (OUTPATIENT)
Dept: OTOLARYNGOLOGY | Facility: CLINIC | Age: 72
End: 2017-08-24
Payer: COMMERCIAL

## 2017-08-24 VITALS
BODY MASS INDEX: 25.78 KG/M2 | TEMPERATURE: 98.2 F | HEIGHT: 64 IN | WEIGHT: 151 LBS | SYSTOLIC BLOOD PRESSURE: 120 MMHG | HEART RATE: 56 BPM | DIASTOLIC BLOOD PRESSURE: 67 MMHG

## 2017-08-24 DIAGNOSIS — H93.12 TINNITUS, LEFT: ICD-10-CM

## 2017-08-24 DIAGNOSIS — H93.13 TINNITUS, BILATERAL: Primary | ICD-10-CM

## 2017-08-24 DIAGNOSIS — H90.3 SENSORINEURAL HEARING LOSS, ASYMMETRICAL: Primary | ICD-10-CM

## 2017-08-24 PROCEDURE — 99207 ZZC NO CHARGE LOS: CPT | Performed by: AUDIOLOGIST

## 2017-08-24 PROCEDURE — 92550 TYMPANOMETRY & REFLEX THRESH: CPT | Performed by: AUDIOLOGIST

## 2017-08-24 PROCEDURE — 92557 COMPREHENSIVE HEARING TEST: CPT | Performed by: AUDIOLOGIST

## 2017-08-24 PROCEDURE — 99203 OFFICE O/P NEW LOW 30 MIN: CPT | Performed by: OTOLARYNGOLOGY

## 2017-08-24 ASSESSMENT — PAIN SCALES - GENERAL: PAINLEVEL: NO PAIN (0)

## 2017-08-24 NOTE — MR AVS SNAPSHOT
After Visit Summary   8/24/2017    Abi Lees    MRN: [id number]           Patient Information     Date Of Birth          1945        Visit Information        Provider Department      8/24/2017 10:00 AM Mauro Reyes MD [facility]        Today's Diagnoses     Tinnitus, bilateral    -  1      Care Instructions    Per Physician's instructions            Follow-ups after your visit        Additional Services     AUDIOLOGY ADULT REFERRAL       Your provider has referred you to: [facility]: [facility] [phone]   [email or web address]    Treatment:  Evaluation & Treatment  Specialty Testing:  Audiogram w/Tymps and Reflexes    Please be aware that coverage of these services is subject to the terms and limitations of your health insurance plan.  Call member services at your health plan with any benefit or coverage questions.      Please bring the following to your appointment:  >>   Any x-rays, CTs or MRIs which have been performed.  Contact the facility where they were done to arrange for  prior to your scheduled appointment.  Any new CT, MRI or other procedures ordered by your specialist must be performed at a [place] facility or coordinated by your clinic's referral office.   >>   List of current medications  >>   This referral request   >>   Any documents/labs given to you for this referral                  Who to contact     If you have questions or need follow up information about today's clinic visit or your schedule please contact [facility] directly at [phone].  Normal or non-critical lab and imaging results will be communicated to you by MyChart, letter or phone within 4 business days after the clinic has received the results. If you do not hear from us within 7 days, please contact the clinic through MyChart or phone. If you have a critical or abnormal lab result, we will notify you by phone as soon as  "possible.  Submit refill requests through [employer] or call your pharmacy and they will forward the refill request to us. Please allow 3 business days for your refill to be completed.          Additional Information About Your Visit        [employer] Information     [employer] lets you send messages to your doctor, view your test results, renew your prescriptions, schedule appointments and more. To sign up, go to [email or web address] . Click on \"Log in\" on the left side of the screen, which will take you to the Welcome page. Then click on \"Sign up Now\" on the right side of the page.     You will be asked to enter the access code listed below, as well as some personal information. Please follow the directions to create your username and password.     Your access code is: [email or web address]  Expires: 10/22/2017 10:10 AM     Your access code will  in 90 days. If you need help or a new code, please call your [place] clinic or [phone].        Care EveryWhere ID     This is your Care EveryWhere ID. This could be used by other organizations to access your [place] medical records  [id number]        Your Vitals Were     Pulse Temperature Height BMI (Body Mass Index)          56 98.2  F (36.8  C) (Oral) 1.613 m (5' 3.5\") 26.33 kg/m2         Blood Pressure from Last 3 Encounters:   17 120/67   17 116/64   17 124/71    Weight from Last 3 Encounters:   17 68.5 kg (151 lb)   17 68.5 kg (151 lb)   17 68.3 kg (150 lb 9.6 oz)              We Performed the Following     AUDIOLOGY ADULT REFERRAL        Primary Care Provider Office Phone # Fax #    EMORY Webb [facility] [phone] [address] [place] [address]        Equal Access to Services     [facility] : [place], waalistair luqadaha, qaybta kaalmick cummings, prosper edmonds. [facility] [phone].    ATENCIÓN: Si jackson marquez, ramona a isidro disposición " servicios gratuitos de asistencia lingüística. Slim love [phone].    We comply with applicable federal civil rights laws and [place] laws. We do not discriminate on the basis of race, color, national origin, age, disability sex, sexual orientation or gender identity.            Thank you!     Thank you for choosing [facility]  for your care. Our goal is always to provide you with excellent care. Hearing back from our patients is one way we can continue to improve our services. Please take a few minutes to complete the written survey that you may receive in the mail after your visit with us. Thank you!             Your Updated Medication List - Protect others around you: Learn how to safely use, store and throw away your medicines at www.disposemymeds.org.          This list is accurate as of: 8/24/17 10:25 AM.  Always use your most recent med list.                   Brand Name Dispense Instructions for use Diagnosis    aspirin 81 MG tablet      Take  by mouth daily.    Bilateral foot pain       atenolol 25 MG tablet    TENORMIN    90 tablet    TAKE ONE TABLET BY MOUTH EVERY DAY    Neuropathy (H), Hyperlipidemia LDL goal <130       atorvastatin 40 MG tablet    LIPITOR    90 tablet    Take 1 tablet (40 mg) by mouth daily    Hyperlipidemia LDL goal <130       gabapentin 300 MG capsule    NEURONTIN    120 capsule    300mg Qam, 600mg at 2pm and 300mg QHS.    Sensory neuropathy (H)       hydrochlorothiazide 25 MG tablet    HYDRODIURIL    90 tablet    Take 1 tablet (25 mg) by mouth daily    Neuropathy (H), Hyperlipidemia LDL goal <130       lisinopril 40 MG tablet    PRINIVIL/ZESTRIL    90 tablet    Take 1 tablet (40 mg) by mouth daily    Essential hypertension with goal blood pressure less than 140/90       order for DME     1 Units    Equipment being ordered: Right wrist splint (carpal tunnel syndrome)    Carpal tunnel syndrome of right wrist

## 2017-08-24 NOTE — NURSING NOTE
"Initial /67 (BP Location: Left arm, Patient Position: Chair, Cuff Size: Adult Large)  Pulse 56  Temp 98.2  F (36.8  C) (Oral)  Ht 1.613 m (5' 3.5\")  Wt 68.5 kg (151 lb)  BMI 26.33 kg/m2 Estimated body mass index is 26.33 kg/(m^2) as calculated from the following:    Height as of this encounter: 1.613 m (5' 3.5\").    Weight as of this encounter: 68.5 kg (151 lb). .    Daniela Betancourt CMA    "

## 2017-08-24 NOTE — MR AVS SNAPSHOT
"              After Visit Summary   2017    Abi Lees    MRN: [id number]           Patient Information     Date Of Birth          1945        Visit Information        Provider Department      2017 9:30 AM Wendi Pizarro [facility]        Today's Diagnoses     Sensorineural hearing loss, asymmetrical    -  1    Tinnitus, left           Follow-ups after your visit        Who to contact     If you have questions or need follow up information about today's clinic visit or your schedule please contact [facility] directly at [phone].  Normal or non-critical lab and imaging results will be communicated to you by [employer], letter or phone within 4 business days after the clinic has received the results. If you do not hear from us within 7 days, please contact the clinic through [employer] or phone. If you have a critical or abnormal lab result, we will notify you by phone as soon as possible.  Submit refill requests through [employer] or call your pharmacy and they will forward the refill request to us. Please allow 3 business days for your refill to be completed.          Additional Information About Your Visit        MyChart Information     [employer] lets you send messages to your doctor, view your test results, renew your prescriptions, schedule appointments and more. To sign up, go to [email or web address] . Click on \"Log in\" on the left side of the screen, which will take you to the Welcome page. Then click on \"Sign up Now\" on the right side of the page.     You will be asked to enter the access code listed below, as well as some personal information. Please follow the directions to create your username and password.     Your access code is: [email or web address]  Expires: 10/22/2017 10:10 AM     Your access code will  in 90 days. If you need help or a new code, please call your [facility] or [phone].        Care EveryWhere ID     This is your Care EveryWhere ID. " This could be used by other organizations to access your [place] medical records  [id number]         Blood Pressure from Last 3 Encounters:   08/24/17 120/67   07/24/17 116/64   03/27/17 124/71    Weight from Last 3 Encounters:   08/24/17 151 lb (68.5 kg)   07/24/17 151 lb (68.5 kg)   03/27/17 150 lb 9.6 oz (68.3 kg)              We Performed the Following     AUDIOGRAM/TYMPANOGRAM - INTERFACE     COMPREHENSIVE HEARING TEST     TYMPANOMETRY AND REFLEX THRESHOLD MEASUREMENTS        Primary Care Provider Office Phone # Fax #    Abigail Zhang Tad Stevens, [facility] [phone] [address] [place] [address]        Equal Access to Services     CHRISTELLE REHMAN : Hadii aad ku hadasho Sojose lali, waaxda luqadaha, qaybta kaalmada adeegyada, waxay idiin haysharifan alaina butler . So [facility] [phone].    ATENCIÓN: Si habla español, tiene a isidro disposición servicios gratuitos de asistencia lingüística. Ll[facility] [phone].    We comply with applicable federal civil rights laws and [place] laws. We do not discriminate on the basis of race, color, national origin, age, disability sex, sexual orientation or gender identity.            Thank you!     Thank you for choosing [facility]  for your care. Our goal is always to provide you with excellent care. Hearing back from our patients is one way we can continue to improve our services. Please take a few minutes to complete the written survey that you may receive in the mail after your visit with us. Thank you!             Your Updated Medication List - Protect others around you: Learn how to safely use, store and throw away your medicines at www.disposemymeds.org.          This list is accurate as of: 8/24/17 10:21 AM.  Always use your most recent med list.                   Brand Name Dispense Instructions for use Diagnosis    aspirin 81 MG tablet      Take  by mouth daily.    Bilateral foot pain       atenolol 25 MG tablet    TENORMIN     90 tablet    TAKE ONE TABLET BY MOUTH EVERY DAY    Neuropathy (H), Hyperlipidemia LDL goal <130       atorvastatin 40 MG tablet    LIPITOR    90 tablet    Take 1 tablet (40 mg) by mouth daily    Hyperlipidemia LDL goal <130       gabapentin 300 MG capsule    NEURONTIN    120 capsule    300mg Qam, 600mg at 2pm and 300mg QHS.    Sensory neuropathy (H)       hydrochlorothiazide 25 MG tablet    HYDRODIURIL    90 tablet    Take 1 tablet (25 mg) by mouth daily    Neuropathy (H), Hyperlipidemia LDL goal <130       lisinopril 40 MG tablet    PRINIVIL/ZESTRIL    90 tablet    Take 1 tablet (40 mg) by mouth daily    Essential hypertension with goal blood pressure less than 140/90       order for DME     1 Units    Equipment being ordered: Right wrist splint (carpal tunnel syndrome)    Carpal tunnel syndrome of right wrist

## 2017-08-24 NOTE — PROGRESS NOTES
History of Present Illness - Abi Lees is a 72 year old female who presents with a 5 month history of left worse than right nonpulsatile tinnitus. She denies any inciting events. She denies firearm exposure. She did used to drive a lot with the window rolled down. She denies any headaches or vertigo.     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     Essential hypertension with goal blood pressure less than 140/90       Current Medications -   Current Outpatient Prescriptions:      atenolol (TENORMIN) 25 MG tablet, TAKE ONE TABLET BY MOUTH EVERY DAY, Disp: 90 tablet, Rfl: 3     atorvastatin (LIPITOR) 40 MG tablet, Take 1 tablet (40 mg) by mouth daily, Disp: 90 tablet, Rfl: 3     hydrochlorothiazide (HYDRODIURIL) 25 MG tablet, Take 1 tablet (25 mg) by mouth daily, Disp: 90 tablet, Rfl: 3     gabapentin (NEURONTIN) 300 MG capsule, 300mg Qam, 600mg at 2pm and 300mg QHS., Disp: 120 capsule, Rfl: 5     lisinopril (PRINIVIL,ZESTRIL) 40 MG tablet, Take 1 tablet (40 mg) by mouth daily, Disp: 90 tablet, Rfl: 3     order for DME, Equipment being ordered: Right wrist splint (carpal tunnel syndrome), Disp: 1 Units, Rfl: 0     aspirin 81 MG tablet, Take  by mouth daily., Disp: , Rfl:     Allergies - No Known Allergies    Social History -   Social History     Social History     Marital status:      Spouse name: Saud     Number of children: 2     Years of education: N/A     Occupational History     retired       [employer]     Social History Main Topics     Smoking status: Former Smoker     Packs/day: 0.50     Types: Cigarettes     Quit date: 7/4/2007     Smokeless tobacco: Never Used     Alcohol use 0.0 oz/week      "0 Standard drinks or equivalent per week      Comment: rare     Drug use: No     Sexual activity: Yes     Partners: Male     Other Topics Concern     Not on file     Social History Narrative       Family History -   No family history of hearing loss.    Review of Systems - As per HPI and PMHx, otherwise 7 system review of the head and neck negative. 10+ system review negative.    Physical Exam  /67 (BP Location: Left arm, Patient Position: Chair, Cuff Size: Adult Large)  Pulse 56  Temp 98.2  F (36.8  C) (Oral)  Ht 1.613 m (5' 3.5\")  Wt 68.5 kg (151 lb)  BMI 26.33 kg/m2  General - The patient is well nourished and well developed, and appears to have good nutritional status.  Alert and oriented to person and place, answers questions and cooperates with examination appropriately.   Head and Face - Normocephalic and atraumatic, with no gross asymmetry noted of the contour of the facial features.  The facial nerve is intact, with strong symmetric movements.  Voice and Breathing - The patient was breathing comfortably without the use of accessory muscles. There was no wheezing, stridor, or stertor.  The patients voice was clear and strong, and had appropriate pitch and quality.  Ears - Bilateral pinna and EACs with normal appearing overlying skin. Tympanic membrane intact with good mobility on pneumatic otoscopy bilaterally. Bony landmarks of the ossicular chain are normal. The tympanic membranes are normal in appearance. No retraction, perforation, or masses.  No fluid or purulence was seen in the external canal or the middle ear.   Eyes - Extraocular movements intact.  Sclera were not icteric or injected, conjunctiva were pink and moist.  Mouth - Examination of the oral cavity showed pink, healthy oral mucosa. No lesions or ulcerations noted.  The tongue was mobile and midline, and the dentition were in good condition.    Throat - The walls of the oropharynx were smooth, pink, moist, symmetric, and had no " lesions or ulcerations.  The tonsillar pillars and soft palate were symmetric.  The uvula was midline on elevation.  Neck - Normal midline excursion of the laryngotracheal complex during swallowing.  Full range of motion on passive movement.  Palpation of the occipital, submental, submandibular, internal jugular chain, and supraclavicular nodes did not demonstrate any abnormal lymph nodes or masses.  The carotid pulse was palpable bilaterally.  Palpation of the thyroid was soft and smooth, with no nodules or goiter appreciated.  The trachea was mobile and midline.  Nose - External contour is symmetric, no gross deflection or scars.  Nasal mucosa is pink and moist with no abnormal mucus.  The septum was midline and non-obstructive, turbinates of normal size and position.  No polyps, masses, or purulence noted on examination.    Audiogram 08/24/17  Pure Tone Thresholds assessed using conventional audiometry with good  reliability from 250-8000 Hz bilaterally using insert earphones and circumaural headphones     RIGHT:  normal and mild sensorineural hearing loss    LEFT:    normal and severe sensorineural hearing loss     Tympanogram:    RIGHT: normal eardrum mobility, ipsi reflex present, contra absent    LEFT:   normal eardrum mobility, ipsi reflex present, contra absent     Speech Reception Threshold:    RIGHT: 15 dB HL    LEFT:   15 dB HL  Word Recognition Score:     RIGHT: 92% at 55 dB HL using W22 recorded word list.    LEFT:   88% at 55 dB HL using W22 recorded word list.        Assessment - Abi Lees is a 72 year old female with left worse than right high frequency SNHL. We discussed possible etiologies of asymmetric SNHL. We discussed the option of MRI Brain to evaluate for a CPA tumor. However, we agreed to instead followup in 6 months with a repeat audiogram. MRI Brain can be ordered if there is worsened asymmetry over this interval.       Dr. Mauro Reyes MD  Otolaryngology  [facility]  [facility]

## 2017-08-24 NOTE — PROGRESS NOTES
AUDIOLOGY REPORT    SUBJECTIVE:  Abi Lees is a 72 year old female who was seen in the Audiology Clinic at [facility] for an audiologic evaluation, referred by Dr. Reyes.  No previous audiograms are available at today's appointment.  The patient reports a 6 month history of tinnitus and decreased hearing in the left ear. The patient denies bilateral otalgia, bilateral drainage, family history of hearing loss and history of noise exposure.     OBJECTIVE:  Otoscopic exam indicates ears are clear of cerumen bilaterally     Pure Tone Thresholds assessed using conventional audiometry with good  reliability from 250-8000 Hz bilaterally using insert earphones and circumaural headphones     RIGHT:  normal and mild sensorineural hearing loss    LEFT:    normal and severe sensorineural hearing loss    Tympanogram:    RIGHT: normal eardrum mobility, ipsi reflex present, contra absent    LEFT:   normal eardrum mobility, ipsi reflex present, contra absent    Speech Reception Threshold:    RIGHT: 15 dB HL    LEFT:   15 dB HL  Word Recognition Score:     RIGHT: 92% at 55 dB HL using W22 recorded word list.    LEFT:   88% at 55 dB HL using W22 recorded word list.      ASSESSMENT:   Normal hearing through 2000 Hz sloping to a asymmetrical mild to severe high frequency sensorineural hearing loss bilaterally.     Today s results were discussed with the patient in detail.     PLAN: It is recommended that the patient be seen by Dr. Reyes for medical evaluation of their ears and hearing evaluation. Patient was counseled regarding hearing loss and impact on communication.   Reviewed possible origins of tinnitus and strategies for management.  Please call this clinic with questions regarding these results or recommendations.        MOIZ [place]  Clinical audiologist [place] # [id number]  8/24/2017

## 2017-10-06 DIAGNOSIS — G62.9 SENSORY NEUROPATHY: ICD-10-CM

## 2017-10-06 DIAGNOSIS — I10 ESSENTIAL HYPERTENSION WITH GOAL BLOOD PRESSURE LESS THAN 140/90: ICD-10-CM

## 2017-10-06 RX ORDER — GABAPENTIN 300 MG/1
CAPSULE ORAL
Qty: 120 CAPSULE | Refills: 0 | Status: SHIPPED | OUTPATIENT
Start: 2017-10-06 | End: 2017-11-08

## 2017-10-06 NOTE — TELEPHONE ENCOUNTER
Lisinopril  40mg      Last Written Prescription Date: 10/19/2016 #90 x 3  Last filled 07/07/2017  Last Office Visit with G, P or [facility] prescribing provider: 07/02/2017 ALEENA Stevens       Potassium   Date Value Ref Range Status   04/06/2017 3.9 3.4 - 5.3 mmol/L Final     Creatinine   Date Value Ref Range Status   04/06/2017 0.80 0.52 - 1.04 mg/dL Final     BP Readings from Last 3 Encounters:   08/24/17 120/67   07/24/17 116/64   03/27/17 124/71

## 2017-10-06 NOTE — TELEPHONE ENCOUNTER
Last filled 03-   Last qty 5  Last office visit 03-      Estrella Simental [facility]  Certified Pharmacy Technician  Phone:[phone]  Fax:[phone]

## 2017-10-11 RX ORDER — LISINOPRIL 40 MG/1
TABLET ORAL
Qty: 90 TABLET | Refills: 2 | Status: SHIPPED | OUTPATIENT
Start: 2017-10-11 | End: 2018-07-02

## 2017-10-11 NOTE — TELEPHONE ENCOUNTER
Prescription approved per [facility] Refill Protocol or patient Primary care provider (PCP)  BOSTON Sneed RN/Miller Peña

## 2017-10-31 ENCOUNTER — TELEPHONE (OUTPATIENT)
Dept: OBGYN | Facility: CLINIC | Age: 72
End: 2017-10-31

## 2017-10-31 NOTE — LETTER
October 31, 2017      Abi Lees  [address] [address]    Dear ,      This letter is to remind you that you are due for your Mammogram.  Please call [phone] to schedule your appointment at your earliest convenience.     If you have completed the tests outside of [place], please have the results forwarded to our office. We will update the chart for your primary Physician to review before your next annual physical.     Sincerely,      Bj Mackenzie MD

## 2017-10-31 NOTE — TELEPHONE ENCOUNTER
Panel Management Review    Date of last visit with a [facility] provider: Wedni Machuca on 8/24/17.  Date of next visit with a [facility] provider: None.    Health Maintenance List    Health Maintenance   Topic Date Due     HEPATITIS C SCREENING  07/28/1963     PNEUMOCOCCAL (1 of 2 - PCV13) 07/28/2011     INFLUENZA VACCINE (SYSTEM ASSIGNED)  09/01/2017     MAMMO SCREEN Q2 YR (SYSTEM ASSIGNED)  01/25/2018     FALL RISK ASSESSMENT  07/24/2018     ADVANCE DIRECTIVE PLANNING Q5 YRS  01/25/2021     LIPID SCREEN Q5 YR FEMALE (SYSTEM ASSIGNED)  04/06/2022     TETANUS IMMUNIZATION (SYSTEM ASSIGNED)  01/25/2026     COLON CANCER SCREEN (SYSTEM ASSIGNED)  01/25/2026     DEXA SCAN SCREENING (SYSTEM ASSIGNED)  Addressed       Composite cancer screening  Chart review shows that this patient is due/due soon for the following Mammogram  No results found for: PAP  Past Surgical History:   Procedure Laterality Date     APPENDECTOMY       HYSTERECTOMY      abdominal- bleeding     OOPHORECTOMY      unilateral     OTHER SURGICAL HISTORY      LLE Varicose vein RF ablation at [facility]        Is hysterectomy listed in surgical history? yes   Is mastectomy listed in surgical history? No     Summary:    Patient is due/failing the following:   Mammogram    Action needed: Patient needs office visit for Mammogram.    Type of outreach:  Sent letter.      Staff Signature:  MELINA DO MA

## 2017-11-07 DIAGNOSIS — G62.9 SENSORY NEUROPATHY: ICD-10-CM

## 2017-11-07 RX ORDER — GABAPENTIN 300 MG/1
CAPSULE ORAL
Qty: 120 CAPSULE | Refills: 0 | Status: CANCELLED | OUTPATIENT
Start: 2017-11-07

## 2017-11-08 NOTE — TELEPHONE ENCOUNTER
Routed to Dr Stovall  To review and address refill per chart note   [facility]  RN/Miller Peña

## 2017-11-09 RX ORDER — GABAPENTIN 300 MG/1
CAPSULE ORAL
Qty: 120 CAPSULE | Refills: 2 | Status: SHIPPED | OUTPATIENT
Start: 2017-11-09 | End: 2018-02-05

## 2017-12-26 ENCOUNTER — TELEPHONE (OUTPATIENT)
Dept: FAMILY MEDICINE | Facility: CLINIC | Age: 72
End: 2017-12-26

## 2017-12-26 NOTE — TELEPHONE ENCOUNTER
"12/26/2017      Patient Communication Preferences indicate  Do not contact  and/or communication by \"Phone\" is not preferred. Call not required per Outreach team.        Outreach ,  Edgar Ortiz     "

## 2018-01-23 ENCOUNTER — TELEPHONE (OUTPATIENT)
Dept: FAMILY MEDICINE | Facility: CLINIC | Age: 73
End: 2018-01-23

## 2018-01-23 NOTE — TELEPHONE ENCOUNTER
Panel Management Review      Patient has the following on her problem list:     Hypertension   Last three blood pressure readings:  BP Readings from Last 3 Encounters:   08/24/17 120/67   07/24/17 116/64   03/27/17 124/71     Blood pressure: Passed    HTN Guidelines:  Age 18-59 BP range:  Less than 140/90  Age 60-85 with Diabetes:  Less than 140/90  Age 60-85 without Diabetes:  less than 150/90        Composite cancer screening  Chart review shows that this patient is due/due soon for the following Mammogram  Summary:    Patient is due/failing the following:   MAMMOGRAM; PCV13    Action needed:   Patient needs nurse only appointment. and Patient needs referral/order: MAMMOGRAM    Type of outreach:    Patient prefers not to be contacted for health maintenance items.     Questions for provider review:    None                                                                                                                                    Deidre Leiva CMA (AAMA)       Chart routed to None .

## 2018-02-05 DIAGNOSIS — G62.9 SENSORY NEUROPATHY: ICD-10-CM

## 2018-02-05 RX ORDER — GABAPENTIN 300 MG/1
CAPSULE ORAL
Qty: 120 CAPSULE | Refills: 2 | Status: SHIPPED | OUTPATIENT
Start: 2018-02-05 | End: 2018-04-30

## 2018-04-02 ENCOUNTER — TELEPHONE (OUTPATIENT)
Dept: FAMILY MEDICINE | Facility: CLINIC | Age: 73
End: 2018-04-02

## 2018-04-02 NOTE — TELEPHONE ENCOUNTER
Panel Management Review      Patient has the following on her problem list:     Hypertension   Last three blood pressure readings:  BP Readings from Last 3 Encounters:   08/24/17 120/67   07/24/17 116/64   03/27/17 124/71     Blood pressure: Passed    HTN Guidelines:  Age 18-59 BP range:  Less than 140/90  Age 60-85 with Diabetes:  Less than 140/90  Age 60-85 without Diabetes:  less than 150/90      Composite cancer screening  Chart review shows that this patient is due/due soon for the following Mammogram  Summary:    Patient is due/failing the following:   MAMMOGRAM    Action needed:   Patient needs referral/order: Mammo    Type of outreach:    Sent letter.    Questions for provider review:    None                                                                                                                                    Tatiana Shepherd MA       Chart routed to None .

## 2018-04-02 NOTE — LETTER
April 2, 2018      Abi Lees  [address]   [place] [address]        Dear Abi,     As part of [place]'s commitment to health and wellness we have recently reviewed your chart and your medical record indicates that you are due for one or more of the following:    -- Mammogram.  If you have had one outside of [place] please call to let us know so that we can update your chart.  Please call one of the following numbers to schedule:  [facility] [phone]  [facility] [phone]  [facility] [phone]  [facility] [phone]  [facility] [phone]  [facility] [phone]    Please try to schedule and/or complete the tests above within the next 2-4 weeks.   The number to call to schedule an appointment at [facility] is [phone].    While we work hard to maintain accurate records on all our patients, it is always possible that this notice does not accurately reflect tests that you may have had. To ensure that we do not continue to send you notices please verify, at your next visit or by a One[employer] message, that we have accurate dates of your tests, even if these were done many years ago.     Working together for your health is our goal.    Thank you for choosing [place] for your healthcare needs.      Sincerely,     MELODIE Sarmiento/Allan LOZANO

## 2018-04-30 DIAGNOSIS — G62.9 SENSORY NEUROPATHY: ICD-10-CM

## 2018-04-30 NOTE — TELEPHONE ENCOUNTER
Requested Prescriptions   Pending Prescriptions Disp Refills     gabapentin (NEURONTIN) 300 MG capsule  Last Written Prescription Date:  2018 #120 x 2  Last filled - not provided  Last office visit: 2017 ALEENA Stevens   Future Office Visit:  None   120 capsule 2     Simg Qam, 600mg at 2pm and 300mg QHS.    There is no refill protocol information for this order

## 2018-05-01 NOTE — TELEPHONE ENCOUNTER
Routing refill request to provider for review/approval because:  Drug not on the [facility] refill protocol  Ashley Hall RN

## 2018-05-02 RX ORDER — GABAPENTIN 300 MG/1
CAPSULE ORAL
Qty: 120 CAPSULE | Refills: 2 | Status: SHIPPED | OUTPATIENT
Start: 2018-05-02 | End: 2018-07-09

## 2018-07-02 DIAGNOSIS — I10 ESSENTIAL HYPERTENSION WITH GOAL BLOOD PRESSURE LESS THAN 140/90: ICD-10-CM

## 2018-07-03 NOTE — TELEPHONE ENCOUNTER
"Requested Prescriptions   Pending Prescriptions Disp Refills     lisinopril (PRINIVIL/ZESTRIL) 40 MG tablet [Pharmacy Med Name: LISINOPRIL 40MG TABS] 90 tablet 2    Last Written Prescription Date:  10/11/2017 #90 x 2  Last filled 04/09/2018  Last office visit: 7/24/2017 ALEENA Stevens   Future Office Visit:   Next 5 appointments (look out 90 days)     Jul 09, 2018 10:40 AM CDT   SHORT with EMORY Webb CNP   [facility]    [address] [address]   [phone]                  Sig: TAKE ONE TABLET BY MOUTH EVERY DAY    ACE Inhibitors (Including Combos) Protocol Failed    7/2/2018 12:24 PM       Failed - Normal serum creatinine on file in past 12 months    Recent Labs   Lab Test  04/06/17   0804   CR  0.80            Failed - Normal serum potassium on file in past 12 months    Recent Labs   Lab Test  04/06/17   0804   POTASSIUM  3.9            Passed - Blood pressure under 140/90 in past 12 months    BP Readings from Last 3 Encounters:   08/24/17 120/67   07/24/17 116/64   03/27/17 124/71                Passed - Recent (12 mo) or future (30 days) visit within the authorizing provider's specialty    Patient had office visit in the last 12 months or has a visit in the next 30 days with authorizing provider or within the authorizing provider's specialty.  See \"Patient Info\" tab in inbasket, or \"Choose Columns\" in Meds & Orders section of the refill encounter.           Passed - Patient is age 18 or older       Passed - No active pregnancy on record       Passed - No positive pregnancy test in past 12 months          "

## 2018-07-05 NOTE — TELEPHONE ENCOUNTER
Routing refill request to provider for review/approval because:  Overdue for labwork. Ashley Hall RN

## 2018-07-06 RX ORDER — LISINOPRIL 40 MG/1
40 TABLET ORAL DAILY
Qty: 90 TABLET | Refills: 0 | Status: SHIPPED | OUTPATIENT
Start: 2018-07-06 | End: 2018-09-24

## 2018-07-09 ENCOUNTER — OFFICE VISIT (OUTPATIENT)
Dept: FAMILY MEDICINE | Facility: CLINIC | Age: 73
End: 2018-07-09
Payer: COMMERCIAL

## 2018-07-09 VITALS
DIASTOLIC BLOOD PRESSURE: 66 MMHG | TEMPERATURE: 97.9 F | OXYGEN SATURATION: 100 % | SYSTOLIC BLOOD PRESSURE: 122 MMHG | HEART RATE: 68 BPM | BODY MASS INDEX: 25.63 KG/M2 | WEIGHT: 147 LBS

## 2018-07-09 DIAGNOSIS — G62.9 SENSORY NEUROPATHY: ICD-10-CM

## 2018-07-09 DIAGNOSIS — Z12.31 ENCOUNTER FOR SCREENING MAMMOGRAM FOR MALIGNANT NEOPLASM OF BREAST: ICD-10-CM

## 2018-07-09 DIAGNOSIS — E78.5 HYPERLIPIDEMIA LDL GOAL <130: ICD-10-CM

## 2018-07-09 DIAGNOSIS — I10 ESSENTIAL HYPERTENSION WITH GOAL BLOOD PRESSURE LESS THAN 140/90: Primary | ICD-10-CM

## 2018-07-09 DIAGNOSIS — Z11.59 NEED FOR HEPATITIS C SCREENING TEST: ICD-10-CM

## 2018-07-09 DIAGNOSIS — G62.9 NEUROPATHY: ICD-10-CM

## 2018-07-09 LAB
ALBUMIN SERPL-MCNC: 4.1 G/DL (ref 3.4–5)
ALP SERPL-CCNC: 99 U/L (ref 40–150)
ALT SERPL W P-5'-P-CCNC: 24 U/L (ref 0–50)
ANION GAP SERPL CALCULATED.3IONS-SCNC: 8 MMOL/L (ref 3–14)
AST SERPL W P-5'-P-CCNC: 27 U/L (ref 0–45)
BILIRUB SERPL-MCNC: 0.6 MG/DL (ref 0.2–1.3)
BUN SERPL-MCNC: 18 MG/DL (ref 7–30)
CALCIUM SERPL-MCNC: 9.7 MG/DL (ref 8.5–10.1)
CHLORIDE SERPL-SCNC: 101 MMOL/L (ref 94–109)
CHOLEST SERPL-MCNC: 180 MG/DL
CO2 SERPL-SCNC: 27 MMOL/L (ref 20–32)
CREAT SERPL-MCNC: 0.82 MG/DL (ref 0.52–1.04)
CREAT UR-MCNC: 47 MG/DL
GFR SERPL CREATININE-BSD FRML MDRD: 69 ML/MIN/1.7M2
GLUCOSE SERPL-MCNC: 96 MG/DL (ref 70–99)
HCV AB SERPL QL IA: NONREACTIVE
HDLC SERPL-MCNC: 60 MG/DL
LDLC SERPL CALC-MCNC: 96 MG/DL
MICROALBUMIN UR-MCNC: <5 MG/L
MICROALBUMIN/CREAT UR: NORMAL MG/G CR (ref 0–25)
NONHDLC SERPL-MCNC: 120 MG/DL
POTASSIUM SERPL-SCNC: 3.8 MMOL/L (ref 3.4–5.3)
PROT SERPL-MCNC: 8 G/DL (ref 6.8–8.8)
SODIUM SERPL-SCNC: 136 MMOL/L (ref 133–144)
TRIGL SERPL-MCNC: 120 MG/DL

## 2018-07-09 PROCEDURE — 80061 LIPID PANEL: CPT | Performed by: NURSE PRACTITIONER

## 2018-07-09 PROCEDURE — 36415 COLL VENOUS BLD VENIPUNCTURE: CPT | Performed by: NURSE PRACTITIONER

## 2018-07-09 PROCEDURE — 82043 UR ALBUMIN QUANTITATIVE: CPT | Performed by: NURSE PRACTITIONER

## 2018-07-09 PROCEDURE — 86803 HEPATITIS C AB TEST: CPT | Performed by: NURSE PRACTITIONER

## 2018-07-09 PROCEDURE — 99213 OFFICE O/P EST LOW 20 MIN: CPT | Performed by: NURSE PRACTITIONER

## 2018-07-09 PROCEDURE — 80053 COMPREHEN METABOLIC PANEL: CPT | Performed by: NURSE PRACTITIONER

## 2018-07-09 RX ORDER — ATENOLOL 25 MG/1
TABLET ORAL
Qty: 90 TABLET | Refills: 3 | Status: SHIPPED | OUTPATIENT
Start: 2018-07-09 | End: 2019-07-15

## 2018-07-09 RX ORDER — GABAPENTIN 300 MG/1
CAPSULE ORAL
Qty: 120 CAPSULE | Refills: 2 | Status: SHIPPED | OUTPATIENT
Start: 2018-07-09 | End: 2018-08-06

## 2018-07-09 RX ORDER — HYDROCHLOROTHIAZIDE 25 MG/1
25 TABLET ORAL DAILY
Qty: 90 TABLET | Refills: 3 | Status: SHIPPED | OUTPATIENT
Start: 2018-07-09 | End: 2019-07-15

## 2018-07-09 RX ORDER — HYDROCHLOROTHIAZIDE 25 MG/1
TABLET ORAL
Qty: 90 TABLET | Refills: 3 | OUTPATIENT
Start: 2018-07-09

## 2018-07-09 RX ORDER — ATENOLOL 25 MG/1
TABLET ORAL
Qty: 90 TABLET | Refills: 3 | OUTPATIENT
Start: 2018-07-09

## 2018-07-09 RX ORDER — ATORVASTATIN CALCIUM 40 MG/1
40 TABLET, FILM COATED ORAL DAILY
Qty: 90 TABLET | Refills: 3 | Status: SHIPPED | OUTPATIENT
Start: 2018-07-09 | End: 2019-07-15

## 2018-07-09 NOTE — LETTER
July 10, 2018      Abi Weberde  [address] [address]        Dear ,    Your recent lab tests were normal.    Resulted Orders   Lipid panel reflex to direct LDL Fasting   Result Value Ref Range    Cholesterol 180 <200 mg/dL    Triglycerides 120 <150 mg/dL      Comment:      Non Fasting    HDL Cholesterol 60 >49 mg/dL    LDL Cholesterol Calculated 96 <100 mg/dL      Comment:      Desirable:       <100 mg/dl    Non HDL Cholesterol 120 <130 mg/dL   Albumin Random Urine Quantitative with Creat Ratio   Result Value Ref Range    Creatinine Urine 47 mg/dL    Albumin Urine mg/L <5 mg/L    Albumin Urine mg/g Cr Unable to calculate due to low value 0 - 25 mg/g Cr   **Hepatitis C Screen Reflex to RNA FUTURE anytime   Result Value Ref Range    Hepatitis C Antibody Nonreactive NR^Nonreactive      Comment:      Assay performance characteristics have not been established for newborns,   infants, and children     Comprehensive metabolic panel   Result Value Ref Range    Sodium 136 133 - 144 mmol/L    Potassium 3.8 3.4 - 5.3 mmol/L    Chloride 101 94 - 109 mmol/L    Carbon Dioxide 27 20 - 32 mmol/L    Anion Gap 8 3 - 14 mmol/L    Glucose 96 70 - 99 mg/dL      Comment:      Non Fasting    Urea Nitrogen 18 7 - 30 mg/dL    Creatinine 0.82 0.52 - 1.04 mg/dL    GFR Estimate 69 >60 mL/min/1.7m2      Comment:      Non  GFR Calc    GFR Estimate If Black 83 >60 mL/min/1.7m2      Comment:       GFR Calc    Calcium 9.7 8.5 - 10.1 mg/dL    Bilirubin Total 0.6 0.2 - 1.3 mg/dL    Albumin 4.1 3.4 - 5.0 g/dL    Protein Total 8.0 6.8 - 8.8 g/dL    Alkaline Phosphatase 99 40 - 150 U/L    ALT 24 0 - 50 U/L    AST 27 0 - 45 U/L       If you have any questions or concerns, please call the clinic at the number listed above.       Sincerely,        LESLY CHUNG NP, APRN CNP/ag

## 2018-07-09 NOTE — PATIENT INSTRUCTIONS
No change with medication      keep exercising     Stay well hydrated - urine should be clear.

## 2018-07-09 NOTE — PROGRESS NOTES
SUBJECTIVE:   Abi Lees is a 72 year old female who presents to clinic today for the following health issues:    Medication Followup of gabapentin    Taking Medication as prescribed: yes    Side Effects:  None    Medication Helping Symptoms:  yes      Hyperlipidemia Follow-Up      Rate your low fat/cholesterol diet?: not monitoring fat    Taking statin?  Yes, no muscle aches from statin    Other lipid medications/supplements?:  none    Hypertension Follow-up      Outpatient blood pressures are not being checked.    Low Salt Diet: low salt      Amount of exercise or physical activity: 2-3 days/week for an average of 15-30 minutes    Problems taking medications regularly: No    Medication side effects: none    Diet: regular (no restrictions) and low salt            Problem list and histories reviewed & adjusted, as indicated.  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     Essential hypertension with goal blood pressure less than 140/90     Past Surgical History:   Procedure Laterality Date     APPENDECTOMY       HYSTERECTOMY      abdominal- bleeding     OOPHORECTOMY      unilateral     OTHER SURGICAL HISTORY      LLE Varicose vein RF ablation at [facility]        Social History   Substance Use Topics     Smoking status: Former Smoker     Packs/day: 0.50     Types: Cigarettes     Quit date: 7/4/2007     Smokeless tobacco: Never Used     Alcohol use 0.0 oz/week     0 Standard drinks or equivalent per week      Comment: rare     No family history on file.      Current Outpatient Prescriptions   Medication Sig Dispense Refill     aspirin 81 MG tablet Take  by mouth daily.       atenolol (TENORMIN) 25 MG tablet TAKE  ONE TABLET BY MOUTH EVERY DAY 90 tablet 3     atorvastatin (LIPITOR) 40 MG tablet Take 1 tablet (40 mg) by mouth daily 90 tablet 3     Cholecalciferol (VITAMIN D-3 PO)        gabapentin (NEURONTIN) 300 MG capsule 300mg Qam, 600mg at 2pm and 300mg QHS.  Due for appointment before next refill 120 capsule 2     hydrochlorothiazide (HYDRODIURIL) 25 MG tablet Take 1 tablet (25 mg) by mouth daily 90 tablet 3     lisinopril (PRINIVIL/ZESTRIL) 40 MG tablet Take 1 tablet (40 mg) by mouth daily Needs appointment and labs before next refill 90 tablet 0     order for DME Equipment being ordered: Right wrist splint (carpal tunnel syndrome) (Patient not taking: Reported on 7/9/2018) 1 Units 0     No Known Allergies  BP Readings from Last 3 Encounters:   07/09/18 122/66   08/24/17 120/67   07/24/17 116/64    Wt Readings from Last 3 Encounters:   07/09/18 147 lb (66.7 kg)   08/24/17 151 lb (68.5 kg)   07/24/17 151 lb (68.5 kg)                    Reviewed and updated as needed this visit by clinical staff       Reviewed and updated as needed this visit by Provider         ROS:  CONSTITUTIONAL: NEGATIVE for fever, chills, change in weight  ENT/MOUTH: NEGATIVE for ear, mouth and throat problems  RESP: NEGATIVE for significant cough or SOB  CV: NEGATIVE for chest pain, palpitations or peripheral edema  GI: NEGATIVE for nausea, abdominal pain, heartburn, or change in bowel habits    OBJECTIVE:     /66 (BP Location: Right arm, Cuff Size: Adult Large)  Pulse 68  Temp 97.9  F (36.6  C) (Oral)  Wt 147 lb (66.7 kg)  SpO2 100%  BMI 25.63 kg/m2  Body mass index is 25.63 kg/(m^2).   GENERAL: healthy, alert and no distress  HENT: ear canals and TM's normal, nose and mouth without ulcers or lesions  NECK: no adenopathy, no asymmetry, masses, or scars and thyroid normal to palpation  RESP: lungs clear to auscultation - no rales, rhonchi or wheezes  CV: regular rate and rhythm, normal S1 S2, no S3 or S4, no murmur, click or rub, no  peripheral edema and peripheral pulses strong  ABDOMEN: soft, nontender, no hepatosplenomegaly, no masses and bowel sounds normal    Diagnostic Test Results:  Pending     ASSESSMENT/PLAN:     ASSESSMENT/PLAN:      ICD-10-CM    1. Essential hypertension with goal blood pressure less than 140/90 I10 Albumin Random Urine Quantitative with Creat Ratio     Comprehensive metabolic panel   2. Hyperlipidemia LDL goal <130 E78.5 atenolol (TENORMIN) 25 MG tablet     atorvastatin (LIPITOR) 40 MG tablet     hydrochlorothiazide (HYDRODIURIL) 25 MG tablet     Lipid panel reflex to direct LDL Fasting     Comprehensive metabolic panel   3. Neuropathy G62.9 atenolol (TENORMIN) 25 MG tablet     hydrochlorothiazide (HYDRODIURIL) 25 MG tablet     Comprehensive metabolic panel   4. Sensory neuropathy G62.9 gabapentin (NEURONTIN) 300 MG capsule   5. Encounter for screening mammogram for malignant neoplasm of breast Z12.31 MA Screening Digital Bilateral   6. Need for hepatitis C screening test Z11.59 **Hepatitis C Screen Reflex to RNA FUTURE anytime       Patient Instructions   No change with medication      keep exercising     Stay well hydrated - urine should be clear.        '         MEDICATIONS:  Continue current medications without change  See Patient Instructions    LESLY CHUNG NP, APRN [facility]

## 2018-07-09 NOTE — TELEPHONE ENCOUNTER
Received two e-refill request from the [facility] for Atenolol 25mg and HCTZ 25mg    Both requests refused - Duplicate, approved in office visit today for #90 x 3

## 2018-08-06 DIAGNOSIS — G62.9 SENSORY NEUROPATHY: ICD-10-CM

## 2018-08-06 NOTE — TELEPHONE ENCOUNTER
Requested Prescriptions   Pending Prescriptions Disp Refills     gabapentin (NEURONTIN) 300 MG capsule  Last Written Prescription Date:  2018 #120 x 2  Last filled - not provided  Last office visit: 2018 ALEENA Stevens   Future Office Visit:  None   120 capsule 2     Simg Qam, 600mg at 2pm and 300mg QHS.  Due for appointment before next refill    There is no refill protocol information for this order

## 2018-08-07 NOTE — TELEPHONE ENCOUNTER
Routing refill request to provider for review/approval because:  Drug not on the [facility] refill protocol   Brianne BORJAS RN

## 2018-08-11 RX ORDER — GABAPENTIN 300 MG/1
CAPSULE ORAL
Qty: 120 CAPSULE | Refills: 0 | Status: SHIPPED | OUTPATIENT
Start: 2018-08-11 | End: 2018-08-13

## 2018-08-13 ENCOUNTER — TELEPHONE (OUTPATIENT)
Dept: FAMILY MEDICINE | Facility: CLINIC | Age: 73
End: 2018-08-13

## 2018-08-13 DIAGNOSIS — G62.9 SENSORY NEUROPATHY: ICD-10-CM

## 2018-08-13 RX ORDER — GABAPENTIN 300 MG/1
CAPSULE ORAL
Qty: 120 CAPSULE | Refills: 0 | Status: CANCELLED | OUTPATIENT
Start: 2018-08-13

## 2018-08-13 RX ORDER — GABAPENTIN 300 MG/1
CAPSULE ORAL
Qty: 120 CAPSULE | Refills: 0 | Status: SHIPPED | OUTPATIENT
Start: 2018-08-13 | End: 2018-09-05

## 2018-08-13 NOTE — TELEPHONE ENCOUNTER
This rx was locally printed (hard copy was printed when order was signed)    Is this at the clinic or can you resend    Thanks

## 2018-08-13 NOTE — TELEPHONE ENCOUNTER
A hard copy was printed on 8/11 (Saturday). Was this actually done and sitting in the clinic somewhere?    Otherwise, please send a new rx    Pt has been waiting since 8/6!!!    Thanks

## 2018-08-14 NOTE — TELEPHONE ENCOUNTER
Script walked over to the [facility].    Pauline Pastor, [facility]

## 2018-09-05 DIAGNOSIS — G62.9 SENSORY NEUROPATHY: ICD-10-CM

## 2018-09-05 RX ORDER — GABAPENTIN 300 MG/1
CAPSULE ORAL
Qty: 120 CAPSULE | Refills: 2 | Status: SHIPPED | OUTPATIENT
Start: 2018-09-05 | End: 2018-09-06

## 2018-09-05 NOTE — TELEPHONE ENCOUNTER
Prescription approved per [facility] Refill Protocol or patient Primary care provider (PCP)  BOSTON Sneed RN/Miller Peña

## 2018-09-05 NOTE — TELEPHONE ENCOUNTER
Requested Prescriptions   Pending Prescriptions Disp Refills     gabapentin (NEURONTIN) 300 MG capsule  Last Written Prescription Date:  18  Last Fill Quantity: 120,  # refills: 0   Last office visit: 2018 with prescribing provider:  18 ashley   Future Office Visit:     120 capsule 0     Simg Qam, 600mg at 2pm and 300mg QHS.  Due for appointment before next refill    There is no refill protocol information for this order

## 2018-09-06 DIAGNOSIS — G62.9 SENSORY NEUROPATHY: ICD-10-CM

## 2018-09-06 RX ORDER — GABAPENTIN 300 MG/1
CAPSULE ORAL
Qty: 120 CAPSULE | Refills: 2 | Status: SHIPPED | OUTPATIENT
Start: 2018-09-06 | End: 2018-12-11

## 2018-09-24 DIAGNOSIS — I10 ESSENTIAL HYPERTENSION WITH GOAL BLOOD PRESSURE LESS THAN 140/90: ICD-10-CM

## 2018-09-24 RX ORDER — LISINOPRIL 40 MG/1
40 TABLET ORAL DAILY
Qty: 90 TABLET | Refills: 1 | Status: SHIPPED | OUTPATIENT
Start: 2018-09-24 | End: 2019-04-01

## 2018-09-24 NOTE — TELEPHONE ENCOUNTER
Prescription approved per [facility] Refill Protocol or patient Primary care provider (PCP)  BOSTON Sneed RN/Miller Peña

## 2018-11-02 ENCOUNTER — TELEPHONE (OUTPATIENT)
Dept: FAMILY MEDICINE | Facility: CLINIC | Age: 73
End: 2018-11-02

## 2018-11-02 NOTE — LETTER
November 2, 2018      Abi Lees  [address]   [place] [facility] [place] [address]        Dear Abi,     As part of [facility]'s commitment to health and wellness we have reviewed your chart and it indicates that you are due for one or more of the following:    -- Mammogram and colonoscopy. It looks like in the past you have declined to have both screenings, but in case you have changed your mind here are the numbers to schedule.     Phone numbers to schedule a mammogram:  ** [facility] [phone] (at [facility] once a month)  [facility] [phone]  [facility] [phone]  [facility] [phone]  [facility] [phone]  [facility] [phone]    Phone numbers to schedule a colonoscopy:  [facility] [phone]  [facility] [phone]  [facility] [phone]  [facility] [phone] (multiple sites, call for locations)    A colonoscopy is the gold standard but if you prefer to do a screening that is LESS INVASIVE AND LESS EXPENSIVE there is a test for you! It is called the FIT test. It is a screening test that is done on a yearly basis and can be DONE AT HOME! Do the test at home and mail it in (you don't even have to pay for postage). If you are willing to do this test, we can order the kit for you to  at our clinic. Please call us at [phone] if you need an order for a colonoscopy or FIT testing.      Thank you for choosing [facility] for your healthcare needs.    Sincerely,     MELODIE Sarmiento / adam

## 2018-11-02 NOTE — TELEPHONE ENCOUNTER
Panel Management Review      Patient has the following on her problem list:     Hypertension   Last three blood pressure readings:  BP Readings from Last 3 Encounters:   07/09/18 122/66   08/24/17 120/67   07/24/17 116/64     Blood pressure: Passed    HTN Guidelines:  Age 18-59 BP range:  Less than 140/90  Age 60-85 with Diabetes:  Less than 140/90  Age 60-85 without Diabetes:  less than 150/90      Composite cancer screening  Chart review shows that this patient is due/due soon for the following Mammogram  Summary:    Patient is due/failing the following:   MAMMOGRAM - patient refused in 2015 - will send letter with number to schedule in case she has changed her mind.  Colonoscopy - patient refused in 2015 - will send letter with number to schedule in case she has changed her mind.    Action needed:   Patient needs referral/order: mammo and colon screen    Type of outreach:    Sent letter.    Questions for provider review:    None                                                                                                                                    Ya VARGHESE       Chart routed to none .

## 2018-12-10 DIAGNOSIS — G62.9 SENSORY NEUROPATHY: ICD-10-CM

## 2018-12-11 RX ORDER — GABAPENTIN 300 MG/1
CAPSULE ORAL
Qty: 120 CAPSULE | Refills: 2 | Status: SHIPPED | OUTPATIENT
Start: 2018-12-11 | End: 2019-03-12

## 2018-12-11 NOTE — TELEPHONE ENCOUNTER
Requested Prescriptions   Pending Prescriptions Disp Refills     gabapentin (NEURONTIN) 300 MG capsule  Last Written Prescription Date:  2018 #120 x 2  Last filled - not provided  Last office visit: 2018 ALEENA Stevens   Future Office Visit:  None   120 capsule 2     Simg Qam, 600mg at 2pm and 300mg QHS.    There is no refill protocol information for this order

## 2019-03-12 ENCOUNTER — OFFICE VISIT (OUTPATIENT)
Dept: OBGYN | Facility: CLINIC | Age: 74
End: 2019-03-12
Payer: COMMERCIAL

## 2019-03-12 VITALS
HEART RATE: 67 BPM | DIASTOLIC BLOOD PRESSURE: 70 MMHG | HEIGHT: 64 IN | SYSTOLIC BLOOD PRESSURE: 113 MMHG | WEIGHT: 153.6 LBS | TEMPERATURE: 96.6 F | RESPIRATION RATE: 18 BRPM | BODY MASS INDEX: 26.22 KG/M2

## 2019-03-12 DIAGNOSIS — N81.6 RECTOCELE: ICD-10-CM

## 2019-03-12 DIAGNOSIS — N81.11 CYSTOCELE, MIDLINE: Primary | ICD-10-CM

## 2019-03-12 DIAGNOSIS — G62.9 SENSORY NEUROPATHY: ICD-10-CM

## 2019-03-12 DIAGNOSIS — A63.0 CONDYLOMA OF FEMALE GENITALIA: ICD-10-CM

## 2019-03-12 PROCEDURE — 99203 OFFICE O/P NEW LOW 30 MIN: CPT | Performed by: OBSTETRICS & GYNECOLOGY

## 2019-03-12 RX ORDER — GABAPENTIN 300 MG/1
CAPSULE ORAL
Qty: 120 CAPSULE | Refills: 2 | OUTPATIENT
Start: 2019-03-12

## 2019-03-12 RX ORDER — GABAPENTIN 300 MG/1
CAPSULE ORAL
Qty: 120 CAPSULE | Refills: 2 | Status: SHIPPED | OUTPATIENT
Start: 2019-03-12 | End: 2019-06-11

## 2019-03-12 ASSESSMENT — MIFFLIN-ST. JEOR: SCORE: 1183.79

## 2019-03-12 NOTE — PROGRESS NOTES
CC:  Consult from Abigail Stevens  for bladder prolapse.  HPI:  Abi Lees is a 73 year old female is a   .  No LMP recorded. Patient has had a hysterectomy.    She is noted protrusion of the vaginal tissue through the introitus.  She has had to push it back up with the finger.  She attempted to use a Tampax and one time which caused some burning sensation.    She is not sexually active but has an interest.  Patients records are available and reviewed at today's visit.    Past GYN history:   Genital warts       Last PAP smear:  Normal  Last TSH:   TSH   Date Value Ref Range Status   02/09/2016 1.11 0.40 - 4.00 mU/L Final    , normal?  Yes    Past Medical History:   Diagnosis Date     CAD in native artery     s/p stent placement in 2007 [facility]     HTN (hypertension), benign      Hyperlipidemia      Peripheral neuropathy      Varicose veins     bilateral with thrombophlebitis LLE       Past Surgical History:   Procedure Laterality Date     APPENDECTOMY       HYSTERECTOMY      abdominal- bleeding     OOPHORECTOMY      unilateral     OTHER SURGICAL HISTORY      LLE Varicose vein RF ablation at [email or web address]        History reviewed. No pertinent family history.    Allergies: Patient has no known allergies.    Current Outpatient Medications   Medication Sig Dispense Refill     atenolol (TENORMIN) 25 MG tablet TAKE ONE TABLET BY MOUTH EVERY DAY 90 tablet 3     atorvastatin (LIPITOR) 40 MG tablet Take 1 tablet (40 mg) by mouth daily 90 tablet 3     Cholecalciferol (VITAMIN D-3 PO)        gabapentin (NEURONTIN) 300 MG capsule 300mg Qam, 600mg at 2pm and 300mg QHS. 120 capsule 2     hydrochlorothiazide (HYDRODIURIL) 25 MG tablet Take 1 tablet (25 mg) by mouth daily 90 tablet 3     lisinopril (PRINIVIL/ZESTRIL) 40 MG tablet Take 1 tablet (40 mg) by mouth daily 90 tablet 1     aspirin 81 MG tablet Take  by mouth daily.       order for DME Equipment being ordered: Right wrist splint (carpal tunnel syndrome) (Patient not  "taking: Reported on 7/9/2018) 1 Units 0       ROS:  C: NEGATIVE for fever, chills, change in weight  I: NEGATIVE for worrisome rashes, moles or lesions  E: NEGATIVE for vision changes or irritation  E/M: NEGATIVE for ear, mouth and throat problems  R: NEGATIVE for significant cough or SOB  CV: NEGATIVE for chest pain, palpitations or peripheral edema  GI: NEGATIVE for nausea, abdominal pain, heartburn, or change in bowel habits  : NEGATIVE for frequency, dysuria, hematuria, vaginal discharge  M: NEGATIVE for significant arthralgias or myalgia  N: NEGATIVE for weakness, dizziness or paresthesias  E: NEGATIVE for temperature intolerance, skin/hair changes  P: NEGATIVE for changes in mood or affect    EXAM:  Blood pressure 113/70, pulse 67, temperature 96.6  F (35.9  C), resp. rate 18, height 1.613 m (5' 3.5\"), weight 69.7 kg (153 lb 9.6 oz), not currently breastfeeding.   BMI= Body mass index is 26.78 kg/m .  General - pleasant female in no acute distress.  Abdomen - soft, nontender, nondistended, no hepatosplenomegaly.  No hernias noted  Pelvic - EG: normal adult female, BUS: within normal limits, Vagina: well rugated, no discharge, Cervix: no lesions or CMT, Uterus: firm, normal sized and nontender, Adnexae: no masses or tenderness.  Rectovaginal - deferred.  Musculoskeletal - no gross deformities.  Neurological - normal strength, sensation, and mental status.            Abi Lees is a 73 year old  with her No LMP recorded. Patient has had a hysterectomy. .  She presents for consultation sent by Abigail Stevens  for relapse  Her symptoms began within the last year  They were/ not associated with any trauma.  She does work in the yard and has been shoveling snow this winter and thinks that may have something to do with the worsening of her symptoms  She has nocturia x 1.  She does/not  use protection.  She does/ not have sexual activity.  She has/ no history of enuresis.  She has  had vaginal " "deliveries.  Her largest baby was 8 pounds.  She had/ no  post partum incontinence or prolapse.  She has/ not  had surgery for incontinence.  Pelvic pressure and Vaginal buldging    Past Surgical History:   Procedure Laterality Date     APPENDECTOMY       HYSTERECTOMY      abdominal- bleeding     OOPHORECTOMY      unilateral     OTHER SURGICAL HISTORY      LLE Varicose vein RF ablation at [facility]        Past Medical History:   Diagnosis Date     CAD in native artery     s/p stent placement in 2007 [facility]     HTN (hypertension), benign      Hyperlipidemia      Peripheral neuropathy      Varicose veins     bilateral with thrombophlebitis LLE         Current Outpatient Medications on File Prior to Visit:  atenolol (TENORMIN) 25 MG tablet TAKE ONE TABLET BY MOUTH EVERY DAY   atorvastatin (LIPITOR) 40 MG tablet Take 1 tablet (40 mg) by mouth daily   Cholecalciferol (VITAMIN D-3 PO)    gabapentin (NEURONTIN) 300 MG capsule 300mg Qam, 600mg at 2pm and 300mg QHS.   hydrochlorothiazide (HYDRODIURIL) 25 MG tablet Take 1 tablet (25 mg) by mouth daily   lisinopril (PRINIVIL/ZESTRIL) 40 MG tablet Take 1 tablet (40 mg) by mouth daily   aspirin 81 MG tablet Take  by mouth daily.   order for DME Equipment being ordered: Right wrist splint (carpal tunnel syndrome) (Patient not taking: Reported on 7/9/2018)     No current facility-administered medications on file prior to visit.      No Known Allergies    /70   Pulse 67   Temp 96.6  F (35.9  C)   Resp 18   Ht 1.613 m (5' 3.5\")   Wt 69.7 kg (153 lb 9.6 oz)   BMI 26.78 kg/m    NTND, no organomegaly, normal BS in all quadrants, without rebound or guarding  Extremity genitalia-right posterior buttocks has a cluster of condyloma.  Introitus is narrow with a grade 2 cystocele and grade 1 rectocele present.  The urethra is well supported and without lesions.  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 The types of anesthesia were reviewed. The pre and post-op expectations were noted. We discussed the risks and benefits of the above procedures. The risk of bleeding, infection and damage to other organs was reviewed.   No guarantees were made.  She did express understanding and desires to proceed with surgery.            Bj Mackenzie

## 2019-03-12 NOTE — TELEPHONE ENCOUNTER
Last filled 02-   Last qty 120  Last office visit 07- with ashley Simental [facility]   Certified Pharmacy Technician

## 2019-03-13 ENCOUNTER — TELEPHONE (OUTPATIENT)
Dept: OBGYN | Facility: CLINIC | Age: 74
End: 2019-03-13

## 2019-03-13 NOTE — TELEPHONE ENCOUNTER
"  You are now scheduled for surgery at [facility].  Below are the details for your surgery.  Please read the \"Preparing for Your Surgery\" instructions and let us know if you have any questions.    Type of surgery: anterior and posterior   Surgeon:  Bj Mackenzie MD  Location of surgery: [facility] OR    Date of surgery: 3-25-19    Time: 11:15am   Arrival Time: 10:15am    Time can change, to be confirmed a couple of days prior by pre-op surgery nurse.    Pre-Op Appt Date: Patient to schedule with a PCP or Family Practice Provider within 30 days to the surgery.  Post-Op Appt Date: To be determined by provider     Packet sent out: Yes  Pre-cert/Authorization completed:  TBD by Financial Securing Office.   MA Sterilization/Hysterectomy Acknowledgment Consent signed: Not Applicable    [facility]  [phone]    Fax: [phone]  Same Day Surgery [phone]  Fax: [phone]    "

## 2019-03-15 ENCOUNTER — OFFICE VISIT (OUTPATIENT)
Dept: FAMILY MEDICINE | Facility: CLINIC | Age: 74
End: 2019-03-15
Payer: COMMERCIAL

## 2019-03-15 VITALS
SYSTOLIC BLOOD PRESSURE: 118 MMHG | BODY MASS INDEX: 26.68 KG/M2 | DIASTOLIC BLOOD PRESSURE: 62 MMHG | HEART RATE: 68 BPM | RESPIRATION RATE: 14 BRPM | WEIGHT: 153 LBS | TEMPERATURE: 97.9 F

## 2019-03-15 DIAGNOSIS — Z01.818 PREOP GENERAL PHYSICAL EXAM: Primary | ICD-10-CM

## 2019-03-15 DIAGNOSIS — N81.11 MIDLINE CYSTOCELE: ICD-10-CM

## 2019-03-15 DIAGNOSIS — N81.6 RECTOCELE: ICD-10-CM

## 2019-03-15 DIAGNOSIS — A63.0 CONDYLOMA: ICD-10-CM

## 2019-03-15 DIAGNOSIS — I10 ESSENTIAL HYPERTENSION WITH GOAL BLOOD PRESSURE LESS THAN 140/90: ICD-10-CM

## 2019-03-15 LAB
ANION GAP SERPL CALCULATED.3IONS-SCNC: 6 MMOL/L (ref 3–14)
BUN SERPL-MCNC: 25 MG/DL (ref 7–30)
CALCIUM SERPL-MCNC: 9.3 MG/DL (ref 8.5–10.1)
CHLORIDE SERPL-SCNC: 103 MMOL/L (ref 94–109)
CO2 SERPL-SCNC: 29 MMOL/L (ref 20–32)
CREAT SERPL-MCNC: 0.86 MG/DL (ref 0.52–1.04)
GFR SERPL CREATININE-BSD FRML MDRD: 67 ML/MIN/{1.73_M2}
GLUCOSE SERPL-MCNC: 102 MG/DL (ref 70–99)
HGB BLD-MCNC: 12.2 G/DL (ref 11.7–15.7)
POTASSIUM SERPL-SCNC: 3.9 MMOL/L (ref 3.4–5.3)
SODIUM SERPL-SCNC: 138 MMOL/L (ref 133–144)

## 2019-03-15 PROCEDURE — 99215 OFFICE O/P EST HI 40 MIN: CPT | Performed by: NURSE PRACTITIONER

## 2019-03-15 PROCEDURE — 36415 COLL VENOUS BLD VENIPUNCTURE: CPT | Performed by: NURSE PRACTITIONER

## 2019-03-15 PROCEDURE — 85018 HEMOGLOBIN: CPT | Performed by: NURSE PRACTITIONER

## 2019-03-15 PROCEDURE — 80048 BASIC METABOLIC PNL TOTAL CA: CPT | Performed by: NURSE PRACTITIONER

## 2019-03-15 PROCEDURE — 93000 ELECTROCARDIOGRAM COMPLETE: CPT | Performed by: NURSE PRACTITIONER

## 2019-03-15 ASSESSMENT — PAIN SCALES - GENERAL: PAINLEVEL: WORST PAIN (10)

## 2019-03-15 NOTE — NURSING NOTE
"Initial /62 (BP Location: Left arm, Patient Position: Chair, Cuff Size: Adult Regular)   Pulse 68   Temp 97.9  F (36.6  C) (Tympanic)   Resp 14   Wt 69.4 kg (153 lb)   BMI 26.68 kg/m   Estimated body mass index is 26.68 kg/m  as calculated from the following:    Height as of 3/12/19: 1.613 m (5' 3.5\").    Weight as of this encounter: 69.4 kg (153 lb). .    Deidre Guadarrama CMA ([facility])    "

## 2019-03-15 NOTE — PROGRESS NOTES
[facility]  [address] [facility] [address]  [phone]  Dept: [phone]    PRE-OP EVALUATION:  Today's date: 3/15/2019    Abi Lees (: 1945) presents for pre-operative evaluation assessment as requested by Dr. Mackenzie.  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    Proposed Surgery/ Procedure: ANTERIOR and  POSTERIOR Colporrhaphy,Wart Removal  Date of Surgery/ Procedure: 3/25/19  Time of Surgery/ Procedure: 1115AM  Hospital/Surgical Facility: [facility]  Primary Physician: Abigail Stevens  Type of Anesthesia Anticipated: TBD    Patient has a Health Care Directive or Living Will:  NO    1. YES - Do you have a history of heart attack, stroke, stent, bypass or surgery on an artery in the head, neck, heart or legs? Stent placed in   2. NO - Do you ever have any pain or discomfort in your chest?  3. NO - Do you have a history of  Heart Failure?  4. NO - Are you troubled by shortness of breath when: walking on the level, up a slight hill or at night?  5. NO - Do you currently have a cold, bronchitis or other respiratory infection?  6. NO - Do you have a cough, shortness of breath or wheezing?  7. NO - Do you sometimes get pains in the calves of your legs when you walk?  8. NO - Do you or anyone in your family have previous history of blood clots?  9. NO - Do you or does anyone in your family have a serious bleeding problem such as prolonged bleeding following surgeries or cuts?  10. NO - Have you ever had problems with anemia or been told to take iron pills?  11. NO - Have you had any abnormal blood loss such as black, tarry or bloody stools, or abnormal vaginal bleeding?  12. NO - Have you ever had a blood transfusion?  13. NO - Have you or any of your relatives ever had problems with anesthesia?  14. NO - Do you have sleep  apnea, excessive snoring or daytime drowsiness?  15. NO - Do you have any prosthetic heart valves?  16. NO - Do you have prosthetic joints?  17. NO - Is there any chance that you may be pregnant?      HPI:     HPI related to upcoming procedure: ANTERIOR and  POSTERIOR Colporrhaphy,Wart Removal        See problem list for active medical problems.  Problems all longstanding and stable, except as noted/documented.  See ROS for pertinent symptoms related to these conditions.                                                                                                                                                          .    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 05/18/2016     Priority: Medium     Advanced directives, counseling/discussion 01/25/2016     Priority: Medium     Advance Care Planning 1/25/2016: Info packet given 1/25/16             Neuropathy 07/31/2015     Priority: Medium     Bilateral feet       Hyperlipidemia LDL goal <130 07/31/2015     Priority: Medium     Colonoscopy refused 07/31/2015     Priority: Medium     Care refused by patient 07/31/2015     Priority: Medium     Mammogram, colonoscopy, DEXA       Osteoarthritis 10/07/2012     Priority: Medium     Bilateral foot pain 10/03/2012     Priority: Medium      Past Medical History:   Diagnosis Date     CAD in native artery     s/p stent placement in 2007 ANWH     HTN (hypertension), benign      Hyperlipidemia      Peripheral neuropathy      Varicose veins     bilateral with thrombophlebitis LLE     Past Surgical History:   Procedure Laterality Date     APPENDECTOMY       HYSTERECTOMY      abdominal- bleeding     OOPHORECTOMY      unilateral     OTHER SURGICAL HISTORY      LLE Varicose vein RF  ablation at [facility]      Current Outpatient Medications   Medication Sig Dispense Refill     aspirin 81 MG tablet Take  by mouth daily.       atenolol (TENORMIN) 25 MG tablet TAKE ONE TABLET BY MOUTH EVERY DAY 90 tablet 3     atorvastatin (LIPITOR) 40 MG tablet Take 1 tablet (40 mg) by mouth daily 90 tablet 3     Cholecalciferol (VITAMIN D-3 PO)        gabapentin (NEURONTIN) 300 MG capsule 300mg Qam, 600mg at 2pm and 300mg QHS. 120 capsule 2     hydrochlorothiazide (HYDRODIURIL) 25 MG tablet Take 1 tablet (25 mg) by mouth daily 90 tablet 3     lisinopril (PRINIVIL/ZESTRIL) 40 MG tablet Take 1 tablet (40 mg) by mouth daily 90 tablet 1     order for DME Equipment being ordered: Right wrist splint (carpal tunnel syndrome) (Patient not taking: Reported on 2018) 1 Units 0     OTC products: no recent use of OTC ASA, NSAIDS or Steroids    Not on File   Latex Allergy: NO    Social History     Tobacco Use     Smoking status: Former Smoker     Packs/day: 0.50     Types: Cigarettes     Last attempt to quit: 2007     Years since quittin.7     Smokeless tobacco: Never Used   Substance Use Topics     Alcohol use: Yes     Alcohol/week: 0.0 oz     Comment: rare     History   Drug Use No       REVIEW OF SYSTEMS:   CONSTITUTIONAL: NEGATIVE for fever, chills, change in weight  INTEGUMENTARY/SKIN: NEGATIVE for worrisome rashes, moles or lesions and POSITIVE for does have  Genital wart  EYES: NEGATIVE for vision changes or irritation and POSITIVE for corrected vision and current with eye exam   ENT/MOUTH: NEGATIVE for ear, mouth and throat problems and no chipped teeth   RESP: NEGATIVE for significant cough or SOB  CV: NEGATIVE for chest pain, palpitations or peripheral edema  GI: NEGATIVE for nausea, abdominal pain, heartburn, or change in bowel habits  : NEGATIVE for frequency, dysuria, or hematuria  MUSCULOSKELETAL:POSITIVE  for arthralgias does have  and neuropathy in the feet   NEURO: POSITIVE for numbness or tingling   Intermittent in the hands and neuropathy in the feet   ENDOCRINE: NEGATIVE for temperature intolerance, skin/hair changes  HEME: NEGATIVE for bleeding problems  PSYCHIATRIC: NEGATIVE for changes in mood or affect    EXAM:   /62 (BP Location: Left arm, Patient Position: Chair, Cuff Size: Adult Regular)   Pulse 68   Temp 97.9  F (36.6  C) (Tympanic)   Resp 14   Wt 69.4 kg (153 lb)   BMI 26.68 kg/m      GENERAL APPEARANCE: healthy, alert and no distress     EYES: Eyes grossly normal to inspection, PERRL and eye track well      HENT: ear canals and TM's normal, nose and mouth without ulcers or lesions, oral mucous membranes moist, oropharynx clear and teeth in good repair      NECK: no adenopathy, no asymmetry, masses, or scars and thyroid normal to palpation     RESP: lungs clear to auscultation - no rales, rhonchi or wheezes     CV: regular rates and rhythm, normal S1 S2, no S3 or S4 and no murmur, click or rub     ABDOMEN:  soft, nontender, no HSM or masses and bowel sounds normal     MS: extremities normal- no gross deformities noted, no evidence of inflammation in joints, FROM in all extremities.     SKIN: no suspicious lesions or rashes     NEURO: Normal strength and tone, sensory exam grossly normal, mentation intact, speech normal, cranial nerves 2-12 intact, rapid alternating movements normal, proprioception normal and does have neuropathy in both feet      PSYCH: mentation appears normal. and affect normal/bright     LYMPHATICS: No cervical adenopathy    DIAGNOSTICS:   EKG: sinus bradycardia, normal axis, normal intervals, no acute ST/T changes c/w ischemia, no LVH by voltage criteria, unchanged from previous tracings, there are no prior tracings available  Hemoglobin (indicated for history of anemia or procedure with significant blood loss such as tonsillectomy, major intraperitoneal surgery, vascular surgery, major spine surgery, total joint replacement) 12.5  Serum Potassium 3.9    Recent Labs    Lab Test 07/09/18  1138 04/06/17  0804 03/15/16  1147   HGB  --   --  13.2   PLT  --   --  198    138  --    POTASSIUM 3.8 3.9  --    CR 0.82 0.80  --         IMPRESSION:   Reason for surgery/procedure: cystocele, midline; rectocele; 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    The proposed surgical procedure is considered INTERMEDIATE risk.    REVISED CARDIAC RISK INDEX  The patient has the following serious cardiovascular risks for perioperative complications such as (MI, PE, VFib and 3  AV Block):  No serious cardiac risks  INTERPRETATION: 0 risks: Class I (very low risk - 0.4% complication rate)    The patient has the following additional risks for perioperative complications:  No identified additional risks    ASSESSMENT/PLAN:      ICD-10-CM    1. Preop general physical exam Z01.818 EKG 12-lead complete w/read - Clinics     Hemoglobin     Basic metabolic panel  (Ca, Cl, CO2, Creat, Gluc, K, Na, BUN)   2. Rectocele N81.6    3. Midline cystocele N81.11    4. Condyloma A63.0    5. Essential hypertension with goal blood pressure less than 140/90 I10 EKG 12-lead complete w/read - Clinics                       RECOMMENDATIONS:         --Patient is to take all scheduled medications on the day of surgery EXCEPT for modifications listed below.    ACE Inhibitor or Angiotensin Receptor Blocker (ARB) Use  Ace inhibitor or Angiotensin Receptor Blocker (ARB) and should HOLD this medication for the 24 hours prior to surgery.      APPROVAL GIVEN to proceed with proposed procedure, without further diagnostic evaluation       Signed Electronically by: LESLY CHUNG NP, APRN CNP    Copy of this evaluation report is provided to requesting physician.    [place] Preop Guidelines    Revised Cardiac Risk Index

## 2019-03-15 NOTE — H&P (VIEW-ONLY)
[facility]  [address] [facility] [address]  [phone]  Dept: [phone]    PRE-OP EVALUATION:  Today's date: 3/15/2019    Abi Lees (: 1945) presents for pre-operative evaluation assessment as requested by Dr. Mackenzie.  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    Proposed Surgery/ Procedure: ANTERIOR and  POSTERIOR Colporrhaphy,Wart Removal  Date of Surgery/ Procedure: 3/25/19  Time of Surgery/ Procedure: 1115AM  Hospital/Surgical Facility: [facility]  Primary Physician: Abigail Stevens  Type of Anesthesia Anticipated: TBD    Patient has a Health Care Directive or Living Will:  NO    1. YES - Do you have a history of heart attack, stroke, stent, bypass or surgery on an artery in the head, neck, heart or legs? Stent placed in   2. NO - Do you ever have any pain or discomfort in your chest?  3. NO - Do you have a history of  Heart Failure?  4. NO - Are you troubled by shortness of breath when: walking on the level, up a slight hill or at night?  5. NO - Do you currently have a cold, bronchitis or other respiratory infection?  6. NO - Do you have a cough, shortness of breath or wheezing?  7. NO - Do you sometimes get pains in the calves of your legs when you walk?  8. NO - Do you or anyone in your family have previous history of blood clots?  9. NO - Do you or does anyone in your family have a serious bleeding problem such as prolonged bleeding following surgeries or cuts?  10. NO - Have you ever had problems with anemia or been told to take iron pills?  11. NO - Have you had any abnormal blood loss such as black, tarry or bloody stools, or abnormal vaginal bleeding?  12. NO - Have you ever had a blood transfusion?  13. NO - Have you or any of your relatives ever had problems with anesthesia?  14. NO - Do you have sleep  apnea, excessive snoring or daytime drowsiness?  15. NO - Do you have any prosthetic heart valves?  16. NO - Do you have prosthetic joints?  17. NO - Is there any chance that you may be pregnant?      HPI:     HPI related to upcoming procedure: ANTERIOR and  POSTERIOR Colporrhaphy,Wart Removal        See problem list for active medical problems.  Problems all longstanding and stable, except as noted/documented.  See ROS for pertinent symptoms related to these conditions.                                                                                                                                                          .    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 05/18/2016     Priority: Medium     Advanced directives, counseling/discussion 01/25/2016     Priority: Medium     Advance Care Planning 1/25/2016: Info packet given 1/25/16             Neuropathy 07/31/2015     Priority: Medium     Bilateral feet       Hyperlipidemia LDL goal <130 07/31/2015     Priority: Medium     Colonoscopy refused 07/31/2015     Priority: Medium     Care refused by patient 07/31/2015     Priority: Medium     Mammogram, colonoscopy, DEXA       Osteoarthritis 10/07/2012     Priority: Medium     Bilateral foot pain 10/03/2012     Priority: Medium      Past Medical History:   Diagnosis Date     CAD in native artery     s/p stent placement in 2007 ANWH     HTN (hypertension), benign      Hyperlipidemia      Peripheral neuropathy      Varicose veins     bilateral with thrombophlebitis LLE     Past Surgical History:   Procedure Laterality Date     APPENDECTOMY       HYSTERECTOMY      abdominal- bleeding     OOPHORECTOMY      unilateral     OTHER SURGICAL HISTORY      LLE Varicose vein RF  ablation at [facility]      Current Outpatient Medications   Medication Sig Dispense Refill     aspirin 81 MG tablet Take  by mouth daily.       atenolol (TENORMIN) 25 MG tablet TAKE ONE TABLET BY MOUTH EVERY DAY 90 tablet 3     atorvastatin (LIPITOR) 40 MG tablet Take 1 tablet (40 mg) by mouth daily 90 tablet 3     Cholecalciferol (VITAMIN D-3 PO)        gabapentin (NEURONTIN) 300 MG capsule 300mg Qam, 600mg at 2pm and 300mg QHS. 120 capsule 2     hydrochlorothiazide (HYDRODIURIL) 25 MG tablet Take 1 tablet (25 mg) by mouth daily 90 tablet 3     lisinopril (PRINIVIL/ZESTRIL) 40 MG tablet Take 1 tablet (40 mg) by mouth daily 90 tablet 1     order for DME Equipment being ordered: Right wrist splint (carpal tunnel syndrome) (Patient not taking: Reported on 2018) 1 Units 0     OTC products: no recent use of OTC ASA, NSAIDS or Steroids    Not on File   Latex Allergy: NO    Social History     Tobacco Use     Smoking status: Former Smoker     Packs/day: 0.50     Types: Cigarettes     Last attempt to quit: 2007     Years since quittin.7     Smokeless tobacco: Never Used   Substance Use Topics     Alcohol use: Yes     Alcohol/week: 0.0 oz     Comment: rare     History   Drug Use No       REVIEW OF SYSTEMS:   CONSTITUTIONAL: NEGATIVE for fever, chills, change in weight  INTEGUMENTARY/SKIN: NEGATIVE for worrisome rashes, moles or lesions and POSITIVE for does have  Genital wart  EYES: NEGATIVE for vision changes or irritation and POSITIVE for corrected vision and current with eye exam   ENT/MOUTH: NEGATIVE for ear, mouth and throat problems and no chipped teeth   RESP: NEGATIVE for significant cough or SOB  CV: NEGATIVE for chest pain, palpitations or peripheral edema  GI: NEGATIVE for nausea, abdominal pain, heartburn, or change in bowel habits  : NEGATIVE for frequency, dysuria, or hematuria  MUSCULOSKELETAL:POSITIVE  for arthralgias does have  and neuropathy in the feet   NEURO: POSITIVE for numbness or tingling   Intermittent in the hands and neuropathy in the feet   ENDOCRINE: NEGATIVE for temperature intolerance, skin/hair changes  HEME: NEGATIVE for bleeding problems  PSYCHIATRIC: NEGATIVE for changes in mood or affect    EXAM:   /62 (BP Location: Left arm, Patient Position: Chair, Cuff Size: Adult Regular)   Pulse 68   Temp 97.9  F (36.6  C) (Tympanic)   Resp 14   Wt 69.4 kg (153 lb)   BMI 26.68 kg/m      GENERAL APPEARANCE: healthy, alert and no distress     EYES: Eyes grossly normal to inspection, PERRL and eye track well      HENT: ear canals and TM's normal, nose and mouth without ulcers or lesions, oral mucous membranes moist, oropharynx clear and teeth in good repair      NECK: no adenopathy, no asymmetry, masses, or scars and thyroid normal to palpation     RESP: lungs clear to auscultation - no rales, rhonchi or wheezes     CV: regular rates and rhythm, normal S1 S2, no S3 or S4 and no murmur, click or rub     ABDOMEN:  soft, nontender, no HSM or masses and bowel sounds normal     MS: extremities normal- no gross deformities noted, no evidence of inflammation in joints, FROM in all extremities.     SKIN: no suspicious lesions or rashes     NEURO: Normal strength and tone, sensory exam grossly normal, mentation intact, speech normal, cranial nerves 2-12 intact, rapid alternating movements normal, proprioception normal and does have neuropathy in both feet      PSYCH: mentation appears normal. and affect normal/bright     LYMPHATICS: No cervical adenopathy    DIAGNOSTICS:   EKG: sinus bradycardia, normal axis, normal intervals, no acute ST/T changes c/w ischemia, no LVH by voltage criteria, unchanged from previous tracings, there are no prior tracings available  Hemoglobin (indicated for history of anemia or procedure with significant blood loss such as tonsillectomy, major intraperitoneal surgery, vascular surgery, major spine surgery, total joint replacement) 12.5  Serum Potassium 3.9    Recent Labs    Lab Test 07/09/18  1138 04/06/17  0804 03/15/16  1147   HGB  --   --  13.2   PLT  --   --  198    138  --    POTASSIUM 3.8 3.9  --    CR 0.82 0.80  --         IMPRESSION:   Reason for surgery/procedure: cystocele, midline; rectocele; 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    The proposed surgical procedure is considered INTERMEDIATE risk.    REVISED CARDIAC RISK INDEX  The patient has the following serious cardiovascular risks for perioperative complications such as (MI, PE, VFib and 3  AV Block):  No serious cardiac risks  INTERPRETATION: 0 risks: Class I (very low risk - 0.4% complication rate)    The patient has the following additional risks for perioperative complications:  No identified additional risks    ASSESSMENT/PLAN:      ICD-10-CM    1. Preop general physical exam Z01.818 EKG 12-lead complete w/read - Clinics     Hemoglobin     Basic metabolic panel  (Ca, Cl, CO2, Creat, Gluc, K, Na, BUN)   2. Rectocele N81.6    3. Midline cystocele N81.11    4. Condyloma A63.0    5. Essential hypertension with goal blood pressure less than 140/90 I10 EKG 12-lead complete w/read - Clinics                       RECOMMENDATIONS:         --Patient is to take all scheduled medications on the day of surgery EXCEPT for modifications listed below.    ACE Inhibitor or Angiotensin Receptor Blocker (ARB) Use  Ace inhibitor or Angiotensin Receptor Blocker (ARB) and should HOLD this medication for the 24 hours prior to surgery.      APPROVAL GIVEN to proceed with proposed procedure, without further diagnostic evaluation       Signed Electronically by: LESLY CHNUG NP, APRN CNP    Copy of this evaluation report is provided to requesting physician.    [place] Preop Guidelines    Revised Cardiac Risk Index

## 2019-03-20 ENCOUNTER — ANESTHESIA EVENT (OUTPATIENT)
Dept: SURGERY | Facility: CLINIC | Age: 74
End: 2019-03-20
Payer: COMMERCIAL

## 2019-03-20 RX ORDER — COVID-19 ANTIGEN TEST
220 KIT MISCELLANEOUS 2 TIMES DAILY WITH MEALS
COMMUNITY
End: 2021-06-30

## 2019-03-24 PROBLEM — A63.0 CONDYLOMA ACUMINATUM OF VULVA: Status: ACTIVE | Noted: 2019-03-24

## 2019-03-24 PROBLEM — N81.6 CYSTOCELE WITH RECTOCELE: Status: ACTIVE | Noted: 2019-03-24

## 2019-03-24 PROBLEM — N81.10 CYSTOCELE WITH RECTOCELE: Status: ACTIVE | Noted: 2019-03-24

## 2019-03-24 ASSESSMENT — LIFESTYLE VARIABLES: TOBACCO_USE: 1

## 2019-03-25 ENCOUNTER — HOSPITAL ENCOUNTER (OUTPATIENT)
Facility: CLINIC | Age: 74
Discharge: HOME | End: 2019-03-25
Attending: OBSTETRICS & GYNECOLOGY | Admitting: OBSTETRICS & GYNECOLOGY
Payer: COMMERCIAL

## 2019-03-25 ENCOUNTER — ANESTHESIA (OUTPATIENT)
Dept: SURGERY | Facility: CLINIC | Age: 74
End: 2019-03-25
Payer: COMMERCIAL

## 2019-03-25 VITALS
HEART RATE: 84 BPM | TEMPERATURE: 97.4 F | BODY MASS INDEX: 25.44 KG/M2 | SYSTOLIC BLOOD PRESSURE: 125 MMHG | OXYGEN SATURATION: 97 % | DIASTOLIC BLOOD PRESSURE: 57 MMHG | RESPIRATION RATE: 16 BRPM | WEIGHT: 149 LBS | HEIGHT: 64 IN

## 2019-03-25 PROCEDURE — 88305 TISSUE EXAM BY PATHOLOGIST: CPT | Mod: 26 | Performed by: OBSTETRICS & GYNECOLOGY

## 2019-03-25 PROCEDURE — 71000027 ZZH RECOVERY PHASE 2 EACH 15 MINS: Performed by: OBSTETRICS & GYNECOLOGY

## 2019-03-25 PROCEDURE — 57260 CMBN ANT PST COLPRHY: CPT | Mod: 51 | Performed by: OBSTETRICS & GYNECOLOGY

## 2019-03-25 PROCEDURE — 37000009 ZZH ANESTHESIA TECHNICAL FEE, EACH ADDTL 15 MIN: Performed by: OBSTETRICS & GYNECOLOGY

## 2019-03-25 PROCEDURE — 25000128 H RX IP 250 OP 636: Performed by: OBSTETRICS & GYNECOLOGY

## 2019-03-25 PROCEDURE — 36000056 ZZH SURGERY LEVEL 3 1ST 30 MIN: Performed by: OBSTETRICS & GYNECOLOGY

## 2019-03-25 PROCEDURE — 27210794 ZZH OR GENERAL SUPPLY STERILE: Performed by: OBSTETRICS & GYNECOLOGY

## 2019-03-25 PROCEDURE — 25000125 ZZHC RX 250: Performed by: NURSE ANESTHETIST, CERTIFIED REGISTERED

## 2019-03-25 PROCEDURE — 25800030 ZZH RX IP 258 OP 636: Performed by: NURSE ANESTHETIST, CERTIFIED REGISTERED

## 2019-03-25 PROCEDURE — 40000306 ZZH STATISTIC PRE PROC ASSESS II: Performed by: OBSTETRICS & GYNECOLOGY

## 2019-03-25 PROCEDURE — 25800030 ZZH RX IP 258 OP 636: Performed by: OBSTETRICS & GYNECOLOGY

## 2019-03-25 PROCEDURE — 37000008 ZZH ANESTHESIA TECHNICAL FEE, 1ST 30 MIN: Performed by: OBSTETRICS & GYNECOLOGY

## 2019-03-25 PROCEDURE — 25000128 H RX IP 250 OP 636: Performed by: NURSE ANESTHETIST, CERTIFIED REGISTERED

## 2019-03-25 PROCEDURE — 56501 DESTROY VULVA LESIONS SIM: CPT | Performed by: OBSTETRICS & GYNECOLOGY

## 2019-03-25 PROCEDURE — 25000125 ZZHC RX 250: Performed by: OBSTETRICS & GYNECOLOGY

## 2019-03-25 PROCEDURE — 57260 CMBN ANT PST COLPRHY: CPT | Mod: AS | Performed by: PHYSICIAN ASSISTANT

## 2019-03-25 PROCEDURE — 36000058 ZZH SURGERY LEVEL 3 EA 15 ADDTL MIN: Performed by: OBSTETRICS & GYNECOLOGY

## 2019-03-25 PROCEDURE — 88305 TISSUE EXAM BY PATHOLOGIST: CPT | Performed by: OBSTETRICS & GYNECOLOGY

## 2019-03-25 PROCEDURE — 71000012 ZZH RECOVERY PHASE 1 LEVEL 1 FIRST HR: Performed by: OBSTETRICS & GYNECOLOGY

## 2019-03-25 PROCEDURE — 25000132 ZZH RX MED GY IP 250 OP 250 PS 637: Performed by: OBSTETRICS & GYNECOLOGY

## 2019-03-25 RX ORDER — CEFAZOLIN SODIUM 2 G/100ML
2 INJECTION, SOLUTION INTRAVENOUS
Status: COMPLETED | OUTPATIENT
Start: 2019-03-25 | End: 2019-03-25

## 2019-03-25 RX ORDER — SODIUM CHLORIDE, SODIUM LACTATE, POTASSIUM CHLORIDE, CALCIUM CHLORIDE 600; 310; 30; 20 MG/100ML; MG/100ML; MG/100ML; MG/100ML
INJECTION, SOLUTION INTRAVENOUS CONTINUOUS
Status: DISCONTINUED | OUTPATIENT
Start: 2019-03-25 | End: 2019-03-25 | Stop reason: HOSPADM

## 2019-03-25 RX ORDER — PHENAZOPYRIDINE HYDROCHLORIDE 200 MG/1
200 TABLET, FILM COATED ORAL ONCE
Status: COMPLETED | OUTPATIENT
Start: 2019-03-25 | End: 2019-03-25

## 2019-03-25 RX ORDER — OXYCODONE HYDROCHLORIDE 5 MG/1
5-10 TABLET ORAL
Status: CANCELLED | OUTPATIENT
Start: 2019-03-25

## 2019-03-25 RX ORDER — KETOROLAC TROMETHAMINE 15 MG/ML
15 INJECTION, SOLUTION INTRAMUSCULAR; INTRAVENOUS ONCE
Status: COMPLETED | OUTPATIENT
Start: 2019-03-25 | End: 2019-03-25

## 2019-03-25 RX ORDER — ALBUTEROL SULFATE 0.83 MG/ML
2.5 SOLUTION RESPIRATORY (INHALATION) EVERY 4 HOURS PRN
Status: DISCONTINUED | OUTPATIENT
Start: 2019-03-25 | End: 2019-03-25 | Stop reason: HOSPADM

## 2019-03-25 RX ORDER — NALOXONE HYDROCHLORIDE 0.4 MG/ML
.1-.4 INJECTION, SOLUTION INTRAMUSCULAR; INTRAVENOUS; SUBCUTANEOUS
Status: CANCELLED | OUTPATIENT
Start: 2019-03-25

## 2019-03-25 RX ORDER — EPHEDRINE SULFATE 50 MG/ML
INJECTION, SOLUTION INTRAMUSCULAR; INTRAVENOUS; SUBCUTANEOUS PRN
Status: DISCONTINUED | OUTPATIENT
Start: 2019-03-25 | End: 2019-03-25

## 2019-03-25 RX ORDER — HYDROMORPHONE HYDROCHLORIDE 1 MG/ML
.3-.5 INJECTION, SOLUTION INTRAMUSCULAR; INTRAVENOUS; SUBCUTANEOUS EVERY 10 MIN PRN
Status: DISCONTINUED | OUTPATIENT
Start: 2019-03-25 | End: 2019-03-25 | Stop reason: HOSPADM

## 2019-03-25 RX ORDER — NALOXONE HYDROCHLORIDE 0.4 MG/ML
.1-.4 INJECTION, SOLUTION INTRAMUSCULAR; INTRAVENOUS; SUBCUTANEOUS
Status: DISCONTINUED | OUTPATIENT
Start: 2019-03-25 | End: 2019-03-25 | Stop reason: HOSPADM

## 2019-03-25 RX ORDER — LIDOCAINE 40 MG/G
CREAM TOPICAL
Status: CANCELLED | OUTPATIENT
Start: 2019-03-25

## 2019-03-25 RX ORDER — ONDANSETRON 2 MG/ML
INJECTION INTRAMUSCULAR; INTRAVENOUS PRN
Status: DISCONTINUED | OUTPATIENT
Start: 2019-03-25 | End: 2019-03-25

## 2019-03-25 RX ORDER — HYDROMORPHONE HCL/0.9% NACL/PF 0.2MG/0.2
0.2 SYRINGE (ML) INTRAVENOUS
Status: CANCELLED | OUTPATIENT
Start: 2019-03-25

## 2019-03-25 RX ORDER — EPINEPHRINE 1 MG/ML
INJECTION, SOLUTION, CONCENTRATE INTRAVENOUS PRN
Status: DISCONTINUED | OUTPATIENT
Start: 2019-03-25 | End: 2019-03-25

## 2019-03-25 RX ORDER — LIDOCAINE 40 MG/G
CREAM TOPICAL
Status: DISCONTINUED | OUTPATIENT
Start: 2019-03-25 | End: 2019-03-25 | Stop reason: HOSPADM

## 2019-03-25 RX ORDER — HYDROXYZINE HYDROCHLORIDE 10 MG/1
10 TABLET, FILM COATED ORAL EVERY 6 HOURS PRN
Status: CANCELLED | OUTPATIENT
Start: 2019-03-25

## 2019-03-25 RX ORDER — BUPIVACAINE HYDROCHLORIDE 7.5 MG/ML
INJECTION, SOLUTION INTRASPINAL PRN
Status: DISCONTINUED | OUTPATIENT
Start: 2019-03-25 | End: 2019-03-25

## 2019-03-25 RX ORDER — LIDOCAINE HYDROCHLORIDE 10 MG/ML
INJECTION, SOLUTION INFILTRATION; PERINEURAL PRN
Status: DISCONTINUED | OUTPATIENT
Start: 2019-03-25 | End: 2019-03-25

## 2019-03-25 RX ORDER — FENTANYL CITRATE 50 UG/ML
INJECTION, SOLUTION INTRAMUSCULAR; INTRAVENOUS PRN
Status: DISCONTINUED | OUTPATIENT
Start: 2019-03-25 | End: 2019-03-25

## 2019-03-25 RX ORDER — METOPROLOL TARTRATE 1 MG/ML
1-2 INJECTION, SOLUTION INTRAVENOUS EVERY 5 MIN PRN
Status: DISCONTINUED | OUTPATIENT
Start: 2019-03-25 | End: 2019-03-25 | Stop reason: HOSPADM

## 2019-03-25 RX ORDER — FENTANYL CITRATE 50 UG/ML
25-50 INJECTION, SOLUTION INTRAMUSCULAR; INTRAVENOUS
Status: CANCELLED | OUTPATIENT
Start: 2019-03-25

## 2019-03-25 RX ORDER — CEFAZOLIN SODIUM 1 G/50ML
1 INJECTION, SOLUTION INTRAVENOUS SEE ADMIN INSTRUCTIONS
Status: DISCONTINUED | OUTPATIENT
Start: 2019-03-25 | End: 2019-03-25 | Stop reason: HOSPADM

## 2019-03-25 RX ORDER — FENTANYL CITRATE 50 UG/ML
25-50 INJECTION, SOLUTION INTRAMUSCULAR; INTRAVENOUS
Status: DISCONTINUED | OUTPATIENT
Start: 2019-03-25 | End: 2019-03-25 | Stop reason: HOSPADM

## 2019-03-25 RX ORDER — ONDANSETRON 4 MG/1
4 TABLET, ORALLY DISINTEGRATING ORAL EVERY 30 MIN PRN
Status: DISCONTINUED | OUTPATIENT
Start: 2019-03-25 | End: 2019-03-25 | Stop reason: HOSPADM

## 2019-03-25 RX ORDER — ONDANSETRON 2 MG/ML
4 INJECTION INTRAMUSCULAR; INTRAVENOUS EVERY 6 HOURS PRN
Status: CANCELLED | OUTPATIENT
Start: 2019-03-25

## 2019-03-25 RX ORDER — DEXAMETHASONE SODIUM PHOSPHATE 4 MG/ML
INJECTION, SOLUTION INTRA-ARTICULAR; INTRALESIONAL; INTRAMUSCULAR; INTRAVENOUS; SOFT TISSUE PRN
Status: DISCONTINUED | OUTPATIENT
Start: 2019-03-25 | End: 2019-03-25

## 2019-03-25 RX ORDER — PROPOFOL 10 MG/ML
INJECTION, EMULSION INTRAVENOUS CONTINUOUS PRN
Status: DISCONTINUED | OUTPATIENT
Start: 2019-03-25 | End: 2019-03-25

## 2019-03-25 RX ORDER — ONDANSETRON 4 MG/1
4 TABLET, ORALLY DISINTEGRATING ORAL EVERY 6 HOURS PRN
Status: CANCELLED | OUTPATIENT
Start: 2019-03-25

## 2019-03-25 RX ORDER — DEXAMETHASONE SODIUM PHOSPHATE 4 MG/ML
4 INJECTION, SOLUTION INTRA-ARTICULAR; INTRALESIONAL; INTRAMUSCULAR; INTRAVENOUS; SOFT TISSUE EVERY 10 MIN PRN
Status: DISCONTINUED | OUTPATIENT
Start: 2019-03-25 | End: 2019-03-25 | Stop reason: HOSPADM

## 2019-03-25 RX ORDER — HYDROXYZINE HYDROCHLORIDE 25 MG/1
25 TABLET, FILM COATED ORAL EVERY 6 HOURS PRN
Status: DISCONTINUED | OUTPATIENT
Start: 2019-03-25 | End: 2019-03-25 | Stop reason: HOSPADM

## 2019-03-25 RX ORDER — SODIUM CHLORIDE, SODIUM LACTATE, POTASSIUM CHLORIDE, CALCIUM CHLORIDE 600; 310; 30; 20 MG/100ML; MG/100ML; MG/100ML; MG/100ML
INJECTION, SOLUTION INTRAVENOUS CONTINUOUS
Status: CANCELLED | OUTPATIENT
Start: 2019-03-25

## 2019-03-25 RX ORDER — ONDANSETRON 2 MG/ML
4 INJECTION INTRAMUSCULAR; INTRAVENOUS EVERY 30 MIN PRN
Status: DISCONTINUED | OUTPATIENT
Start: 2019-03-25 | End: 2019-03-25 | Stop reason: HOSPADM

## 2019-03-25 RX ORDER — HYDROXYZINE HYDROCHLORIDE 50 MG/1
50 TABLET, FILM COATED ORAL EVERY 6 HOURS PRN
Status: DISCONTINUED | OUTPATIENT
Start: 2019-03-25 | End: 2019-03-25 | Stop reason: HOSPADM

## 2019-03-25 RX ADMIN — ONDANSETRON 4 MG: 2 INJECTION INTRAMUSCULAR; INTRAVENOUS at 11:25

## 2019-03-25 RX ADMIN — LIDOCAINE HYDROCHLORIDE 30 MG: 10 INJECTION, SOLUTION INFILTRATION; PERINEURAL at 10:20

## 2019-03-25 RX ADMIN — MIDAZOLAM 2 MG: 1 INJECTION INTRAMUSCULAR; INTRAVENOUS at 10:15

## 2019-03-25 RX ADMIN — PROPOFOL 75 MCG/KG/MIN: 10 INJECTION, EMULSION INTRAVENOUS at 10:20

## 2019-03-25 RX ADMIN — CEFAZOLIN SODIUM 2 G: 2 INJECTION, SOLUTION INTRAVENOUS at 10:15

## 2019-03-25 RX ADMIN — Medication 5 MG: at 11:02

## 2019-03-25 RX ADMIN — PHENAZOPYRIDINE 200 MG: 200 TABLET ORAL at 09:31

## 2019-03-25 RX ADMIN — BUPIVACAINE HYDROCHLORIDE IN DEXTROSE 1.5 ML: 7.5 INJECTION, SOLUTION SUBARACHNOID at 10:23

## 2019-03-25 RX ADMIN — MIDAZOLAM 1 MG: 1 INJECTION INTRAMUSCULAR; INTRAVENOUS at 10:20

## 2019-03-25 RX ADMIN — FENTANYL CITRATE 100 MCG: 50 INJECTION, SOLUTION INTRAMUSCULAR; INTRAVENOUS at 10:15

## 2019-03-25 RX ADMIN — Medication 10 MG: at 10:32

## 2019-03-25 RX ADMIN — LIDOCAINE HYDROCHLORIDE 0.2 ML: 10 INJECTION, SOLUTION EPIDURAL; INFILTRATION; INTRACAUDAL; PERINEURAL at 09:39

## 2019-03-25 RX ADMIN — KETOROLAC TROMETHAMINE 15 MG: 15 INJECTION, SOLUTION INTRAMUSCULAR; INTRAVENOUS at 09:39

## 2019-03-25 RX ADMIN — EPINEPHRINE 0.2 MG: 1 INJECTION, SOLUTION INTRAMUSCULAR; SUBCUTANEOUS at 10:23

## 2019-03-25 RX ADMIN — DEXAMETHASONE SODIUM PHOSPHATE 4 MG: 4 INJECTION, SOLUTION INTRA-ARTICULAR; INTRALESIONAL; INTRAMUSCULAR; INTRAVENOUS; SOFT TISSUE at 10:36

## 2019-03-25 RX ADMIN — SODIUM CHLORIDE, POTASSIUM CHLORIDE, SODIUM LACTATE AND CALCIUM CHLORIDE: 600; 310; 30; 20 INJECTION, SOLUTION INTRAVENOUS at 09:15

## 2019-03-25 RX ADMIN — SODIUM CHLORIDE, POTASSIUM CHLORIDE, SODIUM LACTATE AND CALCIUM CHLORIDE: 600; 310; 30; 20 INJECTION, SOLUTION INTRAVENOUS at 11:00

## 2019-03-25 ASSESSMENT — MIFFLIN-ST. JEOR: SCORE: 1162.99

## 2019-03-25 NOTE — ANESTHESIA CARE TRANSFER NOTE
Patient: Abi Lees    Procedure(s):  Anterior and posterior colporrhaphy, vulvar condyloma removal.    Diagnosis: cystocele,rectocele,condyloma  Diagnosis Additional Information: No value filed.    Anesthesia Type:   General, Spinal     Note:  Airway :Room Air  Patient transferred to:PACU  Handoff Report: Identifed the Patient, Identified the Reponsible Provider, Reviewed the pertinent medical history, Discussed the surgical course, Reviewed Intra-OP anesthesia mangement and issues during anesthesia, Set expectations for post-procedure period and Allowed opportunity for questions and acknowledgement of understanding      Vitals: (Last set prior to Anesthesia Care Transfer)    CRNA VITALS  3/25/2019 1121 - 3/25/2019 1157      3/25/2019             Pulse:  80    SpO2:  98 %                Electronically Signed By: EMORY Monet CRNA  March 25, 2019  11:57 AM

## 2019-03-25 NOTE — DISCHARGE INSTRUCTIONS
Same Day Surgery Discharge Instructions  Special Precautions After Surgery - Adult    1. It is not unusual to feel lightheaded or faint, up to 24 hours after surgery or while taking pain medication.  If you have these symptoms; sit for a few minutes before standing and have someone assist you when getting up.  2. You should rest and relax for the next 24 hours and must have someone stay with you for at least 24 hours after your discharge.  3. DO NOT DRIVE any vehicle or operate mechanical equipment for 24 hours following the end of your surgery.  DO NOT DRIVE while taking narcotic pain medications that have been prescribed by your physician.  If you had a limb operated on, you must be able to use it fully to drive.  4. DO NOT drink alcoholic beverages for 24 hours following surgery or while taking prescription pain medication.  5. Drink clear liquids (apple juice, ginger ale, broth, 7-Up, etc.).  Progress to your regular diet as you feel able.  6. Any questions call your physician and do not make important decisions for 24 hours.    __________________________________________________________________________________________________________________________________  IMPORTANT NUMBERS:    [facility] Main Number:  [phone], [phone]  Pharmacy:  [phone]  Same Day Surgery:  [phone], Monday - Friday until 8:30 p.m.  Urgent Care:  [phone]  Emergency Room:  [phone]      [facility]:  [phone]                                                                             [facility]:  [phone] option 1  [facility]:  [phone]     [facility]:  [phone]   [facility]:  [phone]   Home Medical Equipment: [phone]  [facility]:  [phone]

## 2019-03-25 NOTE — OP NOTE
[facility] Gynecology  Operative Note    Pre-operative diagnosis: cystocele,rectocele,condyloma   Post-operative diagnosis: Same   Procedure: A&P Repair  Excision of condyloma right vulva   Surgeon: Bj Mackenzie MD   Assistant(s): juma Blount PA-C  A surgical assistant was required for this surgery for his experience with retraction, achievement of hemostasis, and wound closure     Anesthesia: Spinal anesthesia   Estimated blood loss: 20   Total IV fluids: (See anesthesia record)  1200 ml   Blood transfusion:    Total urine output: (See anesthesia record)  200ml   Drains: Padgett catheter  Vaginal pack   Specimens: None   Findings: Cystocele and rectocele   Complications: None   Condition: Stable   Comments: Abi Lees   1945  [id number]      Abi Lees  presented for the above procedure.  She has Vaginal prolapse, is s/p hysterectomy  I met with Abi  and her , Walker and discussed the planned procedure as well as the expected post operative course.  Risks of complications were noted and postoperative signs to watch for outlined.  Questions were answered and consent signed.  She was taken to the OR [facility] where she was placed in the supine position. She underwent Spinal  Anesthesia.  She was then placed in the Dorso-lithotomy position in Andrés stirrups.  An examination under anesthesia was performed that showed:  Grade II -III cystocele and a cluster of condyloma on the right posterior vulva  She was prepped and draped.  A timeout was held confirming her identity and proposed procedures. All were in agreement.    The cluster of condyloma on the right posterior vulva was grasped and elevated.  A scalpel was used to excise the lesion which was sent for pathologic evaluation.  Hemostasis was assured with a combination of electrocautery and vertical mattress sutures of 4-0 Vicryl.  Dermabond was subsequently applied.    Attention was turned to the vagina.  A right angle  retractor was placed in the posterior fornix and the presenting part of the bladder was instilled with dilute solution of Pitressin.  A midline incision was made and dissection was carried out laterally freeing the bladder from the mucosa.  The midline incision was extended up to the vaginal apex which was foreshortened.  Once the bladder was free of the mucosa of the fascia was trimmed off the mucosa and available to be sutured across the midline elevating the presenting part of the bladder.  This was secured with 3-0 Vicryl sutures.  The vaginal mucosa was then trimmed and the vagina closed with 3-0 Vicryl running lock sutures.  A Padgett catheter was in the bladder the entire time of the procedure draining Pyridium stained urine.    Attention was then turned to the posterior vagina.  The hymenal ring was grasped with Allis forceps at 5 and 8:00.  The perineal body and the vaginal mucosa instilled with a dilute solution of Pitressin.  A V incision was made from the Allis forceps to the midline of the perineum approximately 1 cm from the anus.  Tissue was dissected from the perineal body up to the introitus.  The tissue was rather friable and tore x2.  Dissection was carried up into the vaginal vault.  A finger was placed in the rectum to guide dissection of the rectum off of the posterior vaginal mucosa.  Incision was made from the Allis forceps to the midline in the mid vagina removing a simeon-shaped piece of tissue.  The vaginal mucosa was then closed with running locked 2-0 Vicryl suture.  The dead space was closed with interrupted 2-0 Vicryl.  The perineal body rebuilt using interrupted 2-0 Vicryl vaginal mucosa closed to the hymenal ring at which time the Allis forceps were removed.  Subcutaneous sutures down to the anus and back to the hymenal ring and then tied in a subcuticular fashion.  Vaginal packing was then placed Padgett catheter was left in place sponge needle counts were correct patient was awakened  taken to the PACU in good condition.    Bj Mackenzie

## 2019-03-25 NOTE — ANESTHESIA PREPROCEDURE EVALUATION
Anesthesia Pre-Procedure Evaluation    Patient: Abi Lees   MRN: [id number] : 1945          Preoperative Diagnosis: cystocele,rectocele,condyloma    Procedure(s):  ANTERIOR and  POSTERIOR Colporrhaphy,Wart Removal    Past Medical History:   Diagnosis Date     CAD in native artery     s/p stent placement in  ANWH     HTN (hypertension), benign      Hyperlipidemia      Peripheral neuropathy      Varicose veins     bilateral with thrombophlebitis LLE     Past Surgical History:   Procedure Laterality Date     APPENDECTOMY       HYSTERECTOMY      abdominal- bleeding     OOPHORECTOMY      unilateral     OTHER SURGICAL HISTORY      LLE Varicose vein RF ablation at [facility]        Anesthesia Evaluation     . Pt has had prior anesthetic.     No history of anesthetic complications          ROS/MED HX    ENT/Pulmonary:     (+)tobacco use, Past use , . .    Neurologic:     (+)neuropathy     Cardiovascular:     (+) Dyslipidemia, hypertension--CAD, --stent,  1 . : . . . :. . Previous cardiac testing date:results:date: results:ECG reviewed date:3/15/19 results:Sinus Bradycardia   Low voltage -possible pulmonary disease.     ABNORMAL    date: results:          METS/Exercise Tolerance:  >4 METS   Hematologic:         Musculoskeletal:   (+) arthritis, , , other musculoskeletal (varicose veins)- bilat. foot pain ; livedo reticularis      GI/Hepatic:  - neg GI/hepatic ROS       Renal/Genitourinary:  - ROS Renal section negative       Endo:     (+) Other Endocrine Disorder elevated antinuclear antibody (ANASTASIA) level.      Psychiatric:  - neg psychiatric ROS       Infectious Disease:  - neg infectious disease ROS       Malignancy:      - no malignancy   Other: Comment: Cystocele with rectocele  Condyloma acuminatum of vulva                         Physical Exam  Normal systems: cardiovascular, pulmonary and dental    Airway   Mallampati: II  TM distance: >3 FB  Neck ROM: full    Dental     Cardiovascular       Pulmonary  "            Lab Results   Component Value Date    WBC 8.2 03/15/2016    HGB 12.2 03/15/2019    HCT 38.6 03/15/2016     03/15/2016    CRP  02/09/2016     <0.2  Reference Values:   Low Risk:           <1.0 mg/L   Average Risk:       1.0-3.0 mg/L   High Risk:          >3.0 mg/L   Acute Inflammation: >8.0 mg/L      SED 15 02/09/2016     03/15/2019    POTASSIUM 3.9 03/15/2019    CHLORIDE 103 03/15/2019    CO2 29 03/15/2019    BUN 25 03/15/2019    CR 0.86 03/15/2019     (H) 03/15/2019    ELIJAH 9.3 03/15/2019    ALBUMIN 4.1 07/09/2018    PROTTOTAL 8.0 07/09/2018    ALT 24 07/09/2018    AST 27 07/09/2018    ALKPHOS 99 07/09/2018    BILITOTAL 0.6 07/09/2018    TSH 1.11 02/09/2016       Preop Vitals  BP Readings from Last 3 Encounters:   03/15/19 118/62   03/12/19 113/70   07/09/18 122/66    Pulse Readings from Last 3 Encounters:   03/15/19 68   03/12/19 67   07/09/18 68      Resp Readings from Last 3 Encounters:   03/15/19 14   03/12/19 18   10/03/12 17    SpO2 Readings from Last 3 Encounters:   07/09/18 100%   05/18/16 97%   03/15/16 100%      Temp Readings from Last 1 Encounters:   03/15/19 36.6  C (97.9  F) (Tympanic)    Ht Readings from Last 1 Encounters:   03/12/19 1.613 m (5' 3.5\")      Wt Readings from Last 1 Encounters:   03/15/19 69.4 kg (153 lb)    Estimated body mass index is 26.68 kg/m  as calculated from the following:    Height as of 3/12/19: 1.613 m (5' 3.5\").    Weight as of 3/15/19: 69.4 kg (153 lb).       Anesthesia Plan      History & Physical Review  History and physical reviewed and following examination; no interval change.    ASA Status:  3 .    NPO Status:  > 8 hours    Plan for General and Spinal with Propofol and Intravenous induction. Maintenance will be Balanced.    PONV prophylaxis:  Ondansetron (or other 5HT-3) and Dexamethasone or Solumedrol       Postoperative Care      Consents  Anesthetic plan, risks, benefits and alternatives discussed with:  Patient and Spouse..           "       Barbra Montiel, EMORY CRNA

## 2019-03-25 NOTE — ANESTHESIA POSTPROCEDURE EVALUATION
Patient: Abi Lees    Procedure(s):  Anterior and posterior colporrhaphy, vulvar condyloma removal.    Diagnosis:cystocele,rectocele,condyloma  Diagnosis Additional Information: No value filed.    Anesthesia Type:  General, Spinal    Note:  Anesthesia Post Evaluation    Patient location during evaluation: Bedside  Patient participation: Able to participate in evaluation but full recovery from regional anesthesia has not yet occurred and is not anticipated to occur within 48 hours  Level of consciousness: awake  Pain management: adequate  Airway patency: patent  Cardiovascular status: stable  Respiratory status: room air  Hydration status: stable  PONV: none     Anesthetic complications: None          Last vitals:  Vitals:    03/25/19 0900 03/25/19 1154   BP: 145/70 109/57   Resp: 16 16   Temp: 36.9  C (98.4  F) 36.4  C (97.5  F)   SpO2: 98% 94%         Electronically Signed By: EMORY Monet CRNA  March 25, 2019  11:57 AM

## 2019-03-27 LAB — COPATH REPORT: NORMAL

## 2019-03-29 ENCOUNTER — ANCILLARY PROCEDURE (OUTPATIENT)
Dept: GENERAL RADIOLOGY | Facility: CLINIC | Age: 74
End: 2019-03-29
Attending: NURSE PRACTITIONER
Payer: COMMERCIAL

## 2019-03-29 ENCOUNTER — OFFICE VISIT (OUTPATIENT)
Dept: FAMILY MEDICINE | Facility: CLINIC | Age: 74
End: 2019-03-29
Payer: COMMERCIAL

## 2019-03-29 VITALS
HEIGHT: 66 IN | TEMPERATURE: 98 F | HEART RATE: 73 BPM | SYSTOLIC BLOOD PRESSURE: 129 MMHG | OXYGEN SATURATION: 100 % | WEIGHT: 152 LBS | BODY MASS INDEX: 24.43 KG/M2 | DIASTOLIC BLOOD PRESSURE: 72 MMHG
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
## 2019-03-29 DIAGNOSIS — M25.511 ACUTE PAIN OF RIGHT SHOULDER: Primary | ICD-10-CM

## 2019-03-29 PROCEDURE — 73030 X-RAY EXAM OF SHOULDER: CPT | Mod: RT

## 2019-03-29 PROCEDURE — 99213 OFFICE O/P EST LOW 20 MIN: CPT | Performed by: NURSE PRACTITIONER

## 2019-03-29 ASSESSMENT — MIFFLIN-ST. JEOR: SCORE: 1216.22

## 2019-03-29 NOTE — PROGRESS NOTES
SUBJECTIVE:   Abi Lees is a 73 year old female who presents to clinic today for the following health issues:    Fall       Duration: this AM     Description (location/character/radiation): Right Shoulder Pain     Intensity:  moderate    Accompanying signs and symptoms: took the dog out and was coming back inside and her shoe got stuck on the  and she fell into the right shoulder     History (similar episodes/previous evaluation): None    Precipitating or alleviating factors: None    Therapies tried and outcome: None     Patient presents today with right shoulder pain.  She is unable to move the shoulder due to pain.    Problem list and histories reviewed & adjusted, as indicated.  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        Social History     Tobacco Use     Smoking status: Former Smoker     Packs/day: 0.50     Types: Cigarettes     Last attempt to quit: 2007     Years since quittin.7     Smokeless tobacco: Never Used   Substance Use Topics     Alcohol use: Yes     Alcohol/week: 0.0 oz     Comment: rare     History reviewed. No pertinent family history.  "     Current Outpatient Medications   Medication Sig Dispense Refill     aspirin 81 MG tablet Take  by mouth daily.       atenolol (TENORMIN) 25 MG tablet TAKE ONE TABLET BY MOUTH EVERY DAY 90 tablet 3     atorvastatin (LIPITOR) 40 MG tablet Take 1 tablet (40 mg) by mouth daily 90 tablet 3     Cholecalciferol (VITAMIN D-3 PO)        gabapentin (NEURONTIN) 300 MG capsule 300mg Qam, 600mg at 2pm and 300mg QHS. 120 capsule 2     hydrochlorothiazide (HYDRODIURIL) 25 MG tablet Take 1 tablet (25 mg) by mouth daily 90 tablet 3     lisinopril (PRINIVIL/ZESTRIL) 40 MG tablet Take 1 tablet (40 mg) by mouth daily 90 tablet 1     naproxen sodium 220 MG capsule Take 220 mg by mouth 2 times daily (with meals)       No Known Allergies    Reviewed and updated as needed this visit by clinical staff  Tobacco  Allergies  Meds  Problems  Med Hx  Surg Hx  Fam Hx  Soc Hx        Reviewed and updated as needed this visit by Provider  Tobacco  Allergies  Meds  Problems  Med Hx  Surg Hx  Fam Hx         ROS:  CONSTITUTIONAL: NEGATIVE for fever, chills, change in weight  RESP: NEGATIVE for significant cough or SOB  CV: NEGATIVE for chest pain, palpitations or peripheral edema  MUSCULOSKELETAL: POSITIVE  for injury to right shoulder secondary to fall  PSYCHIATRIC: NEGATIVE for changes in mood or affect  ROS otherwise negative    OBJECTIVE:     /72   Pulse 73   Temp 98  F (36.7  C) (Tympanic)   Ht 1.676 m (5' 6\")   Wt 68.9 kg (152 lb)   SpO2 100%   BMI 24.53 kg/m    Body mass index is 24.53 kg/m .  GENERAL: healthy, alert and no distress  RESP: lungs clear to auscultation - no rales, rhonchi or wheezes  CV: regular rate and rhythm, normal S1 S2, no S3 or S4, no murmur, click or rub, no peripheral edema and peripheral pulses strong  MS: RUE exam shows inability to lift arm up past about 45 degrees secondary to pain but can use other arm to move it up to 90 degrees, mild swelling on anterior shoulder, negative drop arm " but not great assessment due to pain, no pain on posterior shoulder, tenderness on subacromial and anterior AC joint.  Mild swelling over anterior AC joint with mild bruising.  Pain with adduction and abduction of the arm.  PSYCH: mentation appears normal, affect normal/bright    Diagnostic Test Results:    XR SHOULDER RT G/E 3 VW 3/29/2019 3:55 PM     HISTORY: Fall.     COMPARISON: None.     FINDINGS: No fracture or malalignment. Osseous structures appear  normal.                                                                      IMPRESSION: No acute osseous abnormality.     KAREN POPE MD    ASSESSMENT/PLAN:     1. Acute pain of right shoulder  Xray shows not fracture or dislocation.  With acute injury, it is uncertain at this time if this is just a contusion from the fall or if there is any injury to the tendons and ligaments in the anterior shoulder.  Recommended sling for comfort, ice 3-4 times daily for 15-20 mintues, and tylenol/Aleve as needed for pain.  Offered pain medication and patient declined.  Physical Therapy order placed if symptoms are improving over the weekend, otherwise recommend f/u if symptoms do not improve for further imaging.  Discussed that she should do pendulum swings to keep range of motion in the shoulder to reduce frozen shoulder.    2. Fall, initial encounter  - XR Shoulder Right G/E 3 Views; Future    See Patient Instructions    Randa Osborn NP  [facility]

## 2019-03-29 NOTE — PATIENT INSTRUCTIONS
1.  Wear sling for comfort.  2.  Take tylenol/aleve for pain.  If you need something more you will need to call me on Monday or go to urgent care for pain control.  3.  X-ray shows no fracture or dislocation.  4.  Call on Monday if improving for Physical Therapy if needed.  5.  If worsening or persistent symptoms with use of arm an inability to raise it over the next week, follow-up in clinic.

## 2019-04-03 ENCOUNTER — TELEPHONE (OUTPATIENT)
Dept: OBGYN | Facility: CLINIC | Age: 74
End: 2019-04-03

## 2019-04-03 NOTE — TELEPHONE ENCOUNTER
"Return call to patient.  Spoke with patient on the phone.    S-(situation): light pink spotting, occasionally. Patient denies fever or foul odor to discharge. Patient reports feeling well. Patient reports \" the area is still sore but not above what I would expect.\" normal urination/BM. Following discontinue instructions.    B-(background): 3/25/19 A&P Repair  Excision of condyloma right vulva    A-(assessment): spotting    R-(recommendations): Reassurance. Continue to monitor. Reviewed red flags such as fever, foul odor, UTI symptoms, increased pain, not feeling well.    Call with concerns or with further questions.  F/up appointment 6 weeks from surgery.    Pt in agreement and reports understanding.    Maritza Diaz   [facility]  RN      "

## 2019-04-03 NOTE — TELEPHONE ENCOUNTER
Reason for call:  Patient reporting a symptom    Symptom or request: Surgery a week and a half ago and has some questions.  Still spotting - wondering if that is normal.    Duration (how long have symptoms been present): since surgery    Have you been treated for this before? No    Additional comments:     Phone Number patient can be reached at:  Home number on file [phone] (home)    Best Time:  any    Can we leave a detailed message on this number:  YES    Call taken on 4/3/2019 at 10:05 AM by Roxann Barnard

## 2019-05-14 ENCOUNTER — OFFICE VISIT (OUTPATIENT)
Dept: OBGYN | Facility: CLINIC | Age: 74
End: 2019-05-14
Payer: COMMERCIAL

## 2019-05-14 VITALS
DIASTOLIC BLOOD PRESSURE: 65 MMHG | BODY MASS INDEX: 24.65 KG/M2 | TEMPERATURE: 97.6 F | WEIGHT: 153.4 LBS | HEART RATE: 71 BPM | HEIGHT: 66 IN | RESPIRATION RATE: 16 BRPM | SYSTOLIC BLOOD PRESSURE: 115 MMHG

## 2019-05-14 DIAGNOSIS — N81.10 CYSTOCELE WITH RECTOCELE: Primary | ICD-10-CM

## 2019-05-14 DIAGNOSIS — A63.0 CONDYLOMA ACUMINATUM OF VULVA: ICD-10-CM

## 2019-05-14 DIAGNOSIS — N81.6 CYSTOCELE WITH RECTOCELE: Primary | ICD-10-CM

## 2019-05-14 PROCEDURE — 99024 POSTOP FOLLOW-UP VISIT: CPT | Performed by: OBSTETRICS & GYNECOLOGY

## 2019-05-14 ASSESSMENT — MIFFLIN-ST. JEOR: SCORE: 1222.57

## 2019-05-14 NOTE — PROGRESS NOTES
"ELEN Lees is a 73 year old female presents for post operative check. She is  7  week(s) status post anterior and posterior repair and excision of a vulvar comdyloma.  She reports doing well and denies significant pain or bleeding. Pathological findings significant for Vulvar pappilloma    O.  Blood pressure 115/65, pulse 71, temperature 97.6  F (36.4  C), resp. rate 16, height 1.676 m (5' 6\"), weight 69.6 kg (153 lb 6.4 oz), not currently breastfeeding.    Abd: soft, non-tender, non-distended. Incision clear, dry, and intact without evidence of infection.  Ext gen- normal right side healed well  BUS- normal   Urethra- mid position, well supported  Vagina - well healed and well supported  Minimal rugae  Uterus and cervix - surgically absent    A. /P.  (N81.10,  N81.6) Cystocele with rectocele  (primary encounter diagnosis)  Comment: healed  Plan: resume normal activities    (A63.0) Condyloma acuminatum of vulva  Comment: absent- benign           Follow up prn or when due for next annual exam.    Bj Mackenzie      "

## 2019-06-06 ENCOUNTER — TELEPHONE (OUTPATIENT)
Dept: OBGYN | Facility: CLINIC | Age: 74
End: 2019-06-06

## 2019-06-06 NOTE — LETTER
June 6, 2019      Abi Lees  [address] [address]    Dear ,      This letter is to remind you that you are due for your mammogram.     Please call [phone] to schedule your appointment at your earliest convenience.     If you have completed the tests outside of [place], please have the results forwarded to our office. We will update the chart for your primary Physician to review before your next annual physical.     Sincerely,      Your [place] Care Team

## 2019-06-06 NOTE — TELEPHONE ENCOUNTER
Panel Management Review        Composite cancer screening  Chart review shows that this patient is due/due soon for the following Mammogram  No results found for: PAP  Past Surgical History:   Procedure Laterality Date     APPENDECTOMY       COLPORRHAPHY ANTERIOR, POSTERIOR, COMBINED N/A 3/25/2019    Procedure: Anterior and posterior colporrhaphy, vulvar condyloma removal.;  Surgeon: Bj Mackenzie MD;  Location: [place] OR     HYSTERECTOMY      abdominal- bleeding     OOPHORECTOMY      unilateral     OTHER SURGICAL HISTORY      LLE Varicose vein RF ablation at [facility]        Is hysterectomy listed in surgical history? Yes   Is mastectomy listed in surgical history? No     Summary:    Patient is due/failing the following:   Mammogram    Action needed: Patient needs office visit for mammogram.    Type of outreach:  Sent letter.      Staff Signature:  MELINA DO MA

## 2019-06-10 DIAGNOSIS — G62.9 SENSORY NEUROPATHY: ICD-10-CM

## 2019-06-10 NOTE — TELEPHONE ENCOUNTER
Routing refill request to provider for review/approval because: Drug not on the [facility] refill protocol     Requested Prescriptions   Pending Prescriptions Disp Refills     gabapentin (NEURONTIN) 300 MG capsule 120 capsule 2     Simg Qam, 600mg at 2pm and 300mg QHS.       There is no refill protocol information for this order        Last Written Prescription Date:  3/12/19  Last Fill Quantity: 120,  # refills: 2   Last office visit: 3/29/2019 with ALISON Osborn CNP  Future Office Visit:      Brianne BORJAS RN

## 2019-06-10 NOTE — TELEPHONE ENCOUNTER
Last filled 05-   Last qty 120  Last office visit 03-      Estrella Simental [facility]   Certified Pharmacy Technician

## 2019-06-11 RX ORDER — GABAPENTIN 300 MG/1
CAPSULE ORAL
Qty: 120 CAPSULE | Refills: 2 | Status: SHIPPED | OUTPATIENT
Start: 2019-06-11 | End: 2019-09-10

## 2019-07-15 DIAGNOSIS — E78.5 HYPERLIPIDEMIA LDL GOAL <130: ICD-10-CM

## 2019-07-15 DIAGNOSIS — G62.9 NEUROPATHY: ICD-10-CM

## 2019-07-15 RX ORDER — ATORVASTATIN CALCIUM 40 MG/1
TABLET, FILM COATED ORAL
Qty: 90 TABLET | Refills: 3 | OUTPATIENT
Start: 2019-07-15

## 2019-07-15 RX ORDER — ATORVASTATIN CALCIUM 40 MG/1
40 TABLET, FILM COATED ORAL DAILY
Qty: 90 TABLET | Refills: 3 | Status: SHIPPED | OUTPATIENT
Start: 2019-07-15 | End: 2020-07-03

## 2019-07-15 RX ORDER — ATENOLOL 25 MG/1
TABLET ORAL
Qty: 90 TABLET | Refills: 3 | OUTPATIENT
Start: 2019-07-15

## 2019-07-15 RX ORDER — HYDROCHLOROTHIAZIDE 25 MG/1
25 TABLET ORAL DAILY
Qty: 90 TABLET | Refills: 1 | Status: SHIPPED | OUTPATIENT
Start: 2019-07-15 | End: 2020-01-17

## 2019-07-15 RX ORDER — ATENOLOL 25 MG/1
TABLET ORAL
Qty: 90 TABLET | Refills: 1 | Status: SHIPPED | OUTPATIENT
Start: 2019-07-15 | End: 2020-01-17

## 2019-07-15 RX ORDER — HYDROCHLOROTHIAZIDE 25 MG/1
TABLET ORAL
Qty: 90 TABLET | Refills: 3 | OUTPATIENT
Start: 2019-07-15

## 2019-07-15 NOTE — TELEPHONE ENCOUNTER
Routing refill request for atorvastatin  to provider for review/approval because: Labs not current:  Lipids    Prescription for atenolol and hydrochlorothiazide approved per [facility] Refill Protocol.    Brianne BORJAS RN

## 2019-07-15 NOTE — TELEPHONE ENCOUNTER
"Requested Prescriptions   Pending Prescriptions Disp Refills     atenolol (TENORMIN) 25 MG tablet  Last Written Prescription Date:  7/9/18  Last Fill Quantity: 90,  # refills: 3   Last office visit: 3/29/2019 with prescribing provider:  Irena   Future Office Visit:     90 tablet 3     Sig: TAKE ONE TABLET BY MOUTH EVERY DAY       Beta-Blockers Protocol Passed - 7/15/2019 12:28 PM        Passed - Blood pressure under 140/90 in past 12 months     BP Readings from Last 3 Encounters:   05/14/19 115/65   03/29/19 129/72   03/25/19 125/57                 Passed - Patient is age 6 or older        Passed - Recent (12 mo) or future (30 days) visit within the authorizing provider's specialty     Patient had office visit in the last 12 months or has a visit in the next 30 days with authorizing provider or within the authorizing provider's specialty.  See \"Patient Info\" tab in inbasket, or \"Choose Columns\" in Meds & Orders section of the refill encounter.              Passed - Medication is active on med list        atorvastatin (LIPITOR) 40 MG tablet  Last Written Prescription Date:  7/9/18  Last Fill Quantity: 90,  # refills: 3   Last office visit: 3/29/2019 with prescribing provider:  Irena   Future Office Visit:     90 tablet 3     Sig: Take 1 tablet (40 mg) by mouth daily       Statins Protocol Failed - 7/15/2019 12:28 PM        Failed - LDL on file in past 12 months     Recent Labs   Lab Test 07/09/18  1138   LDL 96             Passed - No abnormal creatine kinase in past 12 months     No lab results found.             Passed - Recent (12 mo) or future (30 days) visit within the authorizing provider's specialty     Patient had office visit in the last 12 months or has a visit in the next 30 days with authorizing provider or within the authorizing provider's specialty.  See \"Patient Info\" tab in inbasket, or \"Choose Columns\" in Meds & Orders section of the refill encounter.              Passed - Medication is active on " "med list        Passed - Patient is age 18 or older        Passed - No active pregnancy on record        Passed - No positive pregnancy test in past 12 months        hydrochlorothiazide (HYDRODIURIL) 25 MG tablet  Last Written Prescription Date:  7/9/18  Last Fill Quantity: 90,  # refills: 3   Last office visit: 3/29/2019 with prescribing provider:  Irena   Future Office Visit:     90 tablet 3     Sig: Take 1 tablet (25 mg) by mouth daily       Diuretics (Including Combos) Protocol Passed - 7/15/2019 12:28 PM        Passed - Blood pressure under 140/90 in past 12 months     BP Readings from Last 3 Encounters:   05/14/19 115/65   03/29/19 129/72   03/25/19 125/57                 Passed - Recent (12 mo) or future (30 days) visit within the authorizing provider's specialty     Patient had office visit in the last 12 months or has a visit in the next 30 days with authorizing provider or within the authorizing provider's specialty.  See \"Patient Info\" tab in inbasket, or \"Choose Columns\" in Meds & Orders section of the refill encounter.              Passed - Medication is active on med list        Passed - Patient is age 18 or older        Passed - No active pregancy on record        Passed - Normal serum creatinine on file in past 12 months     Recent Labs   Lab Test 03/15/19  1500   CR 0.86              Passed - Normal serum potassium on file in past 12 months     Recent Labs   Lab Test 03/15/19  1500   POTASSIUM 3.9                    Passed - Normal serum sodium on file in past 12 months     Recent Labs   Lab Test 03/15/19  1500                 Passed - No positive pregnancy test in past 12 months          "

## 2019-07-15 NOTE — TELEPHONE ENCOUNTER
Last filled 04-   Last qty 90  Last office visit 03- on all 3 medication      Estrella Simental [facility]   Certified Pharmacy Technician

## 2019-07-16 RX ORDER — ATORVASTATIN CALCIUM 40 MG/1
TABLET, FILM COATED ORAL
Qty: 90 TABLET | Refills: 3 | OUTPATIENT
Start: 2019-07-16

## 2019-09-09 DIAGNOSIS — I10 ESSENTIAL HYPERTENSION WITH GOAL BLOOD PRESSURE LESS THAN 140/90: ICD-10-CM

## 2019-09-09 DIAGNOSIS — G62.9 SENSORY NEUROPATHY: ICD-10-CM

## 2019-09-10 RX ORDER — LISINOPRIL 40 MG/1
TABLET ORAL
Qty: 90 TABLET | Refills: 1 | Status: SHIPPED | OUTPATIENT
Start: 2019-09-10 | End: 2020-03-17

## 2019-09-10 RX ORDER — GABAPENTIN 300 MG/1
CAPSULE ORAL
Qty: 120 CAPSULE | Refills: 2 | Status: SHIPPED | OUTPATIENT
Start: 2019-09-10 | End: 2019-12-05

## 2019-09-10 NOTE — TELEPHONE ENCOUNTER
Requested Prescriptions   Pending Prescriptions Disp Refills     gabapentin (NEURONTIN) 300 MG capsule 120 capsule 2     Simg Qam, 600mg at 2pm and 300mg QHS.  Last Written Prescription Date:  2019 #120 x 2  Last filled - not provided  Last office visit: 3/15/2019 ALEENA Stevens   Future Office Visit:  None         There is no refill protocol information for this order

## 2019-09-10 NOTE — TELEPHONE ENCOUNTER
"Requested Prescriptions   Pending Prescriptions Disp Refills     lisinopril (PRINIVIL/ZESTRIL) 40 MG tablet [Pharmacy Med Name: LISINOPRIL 40MG TABS] 90 tablet 1     Sig: TAKE ONE TABLET BY MOUTH ONCE DAILY  Last Written Prescription Date:  04/01/2019 #90 x 1  Last filled 06/24/2019  Last office visit: 06/15/2019 ALEENA Stevens   Future Office Visit:  None         ACE Inhibitors (Including Combos) Protocol Passed - 9/9/2019  8:36 AM        Passed - Blood pressure under 140/90 in past 12 months     BP Readings from Last 3 Encounters:   05/14/19 115/65   03/29/19 129/72   03/25/19 125/57                 Passed - Recent (12 mo) or future (30 days) visit within the authorizing provider's specialty     Patient had office visit in the last 12 months or has a visit in the next 30 days with authorizing provider or within the authorizing provider's specialty.  See \"Patient Info\" tab in inbasket, or \"Choose Columns\" in Meds & Orders section of the refill encounter.              Passed - Medication is active on med list        Passed - Patient is age 18 or older        Passed - No active pregnancy on record        Passed - Normal serum creatinine on file in past 12 months     Recent Labs   Lab Test 03/15/19  1500   CR 0.86             Passed - Normal serum potassium on file in past 12 months     Recent Labs   Lab Test 03/15/19  1500   POTASSIUM 3.9             Passed - No positive pregnancy test within past 12 months          "

## 2019-09-10 NOTE — TELEPHONE ENCOUNTER
Prescription approved per [facility] Refill Protocol.  Barbra Farnsworth RN

## 2019-09-10 NOTE — TELEPHONE ENCOUNTER
Routing refill request to provider for review/approval because:  Drug not on the [facility] refill protocol     Pauline Lovett RN

## 2019-12-05 DIAGNOSIS — G62.9 SENSORY NEUROPATHY: ICD-10-CM

## 2019-12-05 RX ORDER — GABAPENTIN 300 MG/1
CAPSULE ORAL
Qty: 120 CAPSULE | Refills: 2 | Status: SHIPPED | OUTPATIENT
Start: 2019-12-05 | End: 2020-03-18

## 2019-12-05 NOTE — TELEPHONE ENCOUNTER
Routing refill request to provider for review/approval because:  Drug not on the [facility] refill protocol     Amparo Cruz RN

## 2019-12-05 NOTE — TELEPHONE ENCOUNTER
Requested Prescriptions   Pending Prescriptions Disp Refills     gabapentin (NEURONTIN) 300 MG capsule 120 capsule 2     Simg Qam, 600mg at 2pm and 300mg QHS.  Last Written Prescription Date:  09/10/2019 #120 x 2  Last filled - not provided  Last office visit: 03/15/2019 ALEENA Stevens  Future Office Visit:  None         There is no refill protocol information for this order

## 2020-01-13 DIAGNOSIS — E78.5 HYPERLIPIDEMIA LDL GOAL <130: ICD-10-CM

## 2020-01-13 DIAGNOSIS — G62.9 NEUROPATHY: ICD-10-CM

## 2020-01-15 NOTE — TELEPHONE ENCOUNTER
"Requested Prescriptions   Pending Prescriptions Disp Refills     atenolol (TENORMIN) 25 MG tablet [Pharmacy Med Name: ATENOLOL 25MG TABS]  Last Written Prescription Date:  7/15/19  Last Fill Quantity: 90,  # refills: 1   Last office visit: 3/29/2019 with prescribing provider:  mik   Future Office Visit:     90 tablet 1     Sig: TAKE ONE TABLET BY MOUTH ONCE DAILY       Beta-Blockers Protocol Passed - 1/13/2020  1:57 PM        Passed - Blood pressure under 140/90 in past 12 months     BP Readings from Last 3 Encounters:   05/14/19 115/65   03/29/19 129/72   03/25/19 125/57                 Passed - Patient is age 6 or older        Passed - Recent (12 mo) or future (30 days) visit within the authorizing provider's specialty     Patient has had an office visit with the authorizing provider or a provider within the authorizing providers department within the previous 12 mos or has a future within next 30 days. See \"Patient Info\" tab in inbasket, or \"Choose Columns\" in Meds & Orders section of the refill encounter.              Passed - Medication is active on med list        hydrochlorothiazide (HYDRODIURIL) 25 MG tablet [Pharmacy Med Name: HYDROCHLOROTHIAZIDE 25MG TABS]  Last Written Prescription Date:  7/15/19  Last Fill Quantity: 90,  # refills: 1   Last office visit: 3/29/2019 with prescribing provider:  mik   Future Office Visit:     90 tablet 1     Sig: TAKE ONE TABLET BY MOUTH ONCE DAILY       Diuretics (Including Combos) Protocol Passed - 1/13/2020  1:57 PM        Passed - Blood pressure under 140/90 in past 12 months     BP Readings from Last 3 Encounters:   05/14/19 115/65   03/29/19 129/72   03/25/19 125/57                 Passed - Recent (12 mo) or future (30 days) visit within the authorizing provider's specialty     Patient has had an office visit with the authorizing provider or a provider within the authorizing providers department within the previous 12 mos or has a future within next 30 days. See " "\"Patient Info\" tab in inbasket, or \"Choose Columns\" in Meds & Orders section of the refill encounter.              Passed - Medication is active on med list        Passed - Patient is age 18 or older        Passed - No active pregancy on record        Passed - Normal serum creatinine on file in past 12 months     Recent Labs   Lab Test 03/15/19  1500   CR 0.86              Passed - Normal serum potassium on file in past 12 months     Recent Labs   Lab Test 03/15/19  1500   POTASSIUM 3.9                    Passed - Normal serum sodium on file in past 12 months     Recent Labs   Lab Test 03/15/19  1500                 Passed - No positive pregnancy test in past 12 months          "

## 2020-01-17 RX ORDER — ATENOLOL 25 MG/1
TABLET ORAL
Qty: 90 TABLET | Refills: 0 | Status: SHIPPED | OUTPATIENT
Start: 2020-01-17 | End: 2020-03-30

## 2020-01-17 RX ORDER — HYDROCHLOROTHIAZIDE 25 MG/1
TABLET ORAL
Qty: 90 TABLET | Refills: 0 | Status: SHIPPED | OUTPATIENT
Start: 2020-01-17 | End: 2020-03-30

## 2020-03-16 DIAGNOSIS — I10 ESSENTIAL HYPERTENSION WITH GOAL BLOOD PRESSURE LESS THAN 140/90: ICD-10-CM

## 2020-03-17 DIAGNOSIS — G62.9 SENSORY NEUROPATHY: ICD-10-CM

## 2020-03-17 RX ORDER — LISINOPRIL 40 MG/1
TABLET ORAL
Qty: 90 TABLET | Refills: 1 | Status: SHIPPED | OUTPATIENT
Start: 2020-03-17 | End: 2020-07-03

## 2020-03-17 NOTE — TELEPHONE ENCOUNTER
"Requested Prescriptions   Pending Prescriptions Disp Refills     lisinopril (ZESTRIL) 40 MG tablet [Pharmacy Med Name: LISINOPRIL 40MG TABS] 90 tablet 1     Sig: TAKE ONE TABLET BY MOUTH ONCE DAILY  Last Written Prescription Date:  09/10/2019 #90 x 1  Last filled 12/05/2019  Last office visit: 03/15/2019 ALEENA Stevens   Future Office Visit: None         ACE Inhibitors (Including Combos) Protocol Failed - 3/16/2020  9:09 AM        Failed - Recent (12 mo) or future (30 days) visit within the authorizing provider's specialty     Patient has had an office visit with the authorizing provider or a provider within the authorizing providers department within the previous 12 mos or has a future within next 30 days. See \"Patient Info\" tab in inbasket, or \"Choose Columns\" in Meds & Orders section of the refill encounter.              Failed - Normal serum creatinine on file in past 12 months     Recent Labs   Lab Test 03/15/19  1500   CR 0.86       Ok to refill medication if creatinine is low          Failed - Normal serum potassium on file in past 12 months     Recent Labs   Lab Test 03/15/19  1500   POTASSIUM 3.9             Passed - Blood pressure under 140/90 in past 12 months     BP Readings from Last 3 Encounters:   05/14/19 115/65   03/29/19 129/72   03/25/19 125/57                 Passed - Medication is active on med list        Passed - Patient is age 18 or older        Passed - No active pregnancy on record        Passed - No positive pregnancy test within past 12 months             "

## 2020-03-17 NOTE — TELEPHONE ENCOUNTER
Routing refill request to provider for review/approval because:  Drug not on the [facility] refill protocol   No current labs or OV   [facility]  RN/Miller Peña

## 2020-03-18 RX ORDER — GABAPENTIN 300 MG/1
CAPSULE ORAL
Qty: 120 CAPSULE | Refills: 2 | Status: SHIPPED | OUTPATIENT
Start: 2020-03-18 | End: 2020-06-15

## 2020-03-18 NOTE — TELEPHONE ENCOUNTER
Routing refill request to provider for review/approval because:  Patient needs to be seen because it has been more than 1 year since last office visit.    LOV: 3/15/2019-Pre-op, 7/9/2018(Medication Followup of gabapentin)  Pauline Lovett RN

## 2020-03-30 DIAGNOSIS — G62.9 NEUROPATHY: ICD-10-CM

## 2020-03-30 DIAGNOSIS — E78.5 HYPERLIPIDEMIA LDL GOAL <130: ICD-10-CM

## 2020-03-30 RX ORDER — HYDROCHLOROTHIAZIDE 25 MG/1
TABLET ORAL
Qty: 90 TABLET | Refills: 0 | Status: SHIPPED | OUTPATIENT
Start: 2020-03-30 | End: 2020-07-03

## 2020-03-30 RX ORDER — ATENOLOL 25 MG/1
TABLET ORAL
Qty: 90 TABLET | Refills: 0 | Status: SHIPPED | OUTPATIENT
Start: 2020-03-30 | End: 2020-07-03

## 2020-07-02 NOTE — PROGRESS NOTES
"Abi Lees is a 74 year old female who is being evaluated via a billable telephone visit.      The patient has been notified of following:     \"This telephone visit will be conducted via a call between you and your physician/provider. We have found that certain health care needs can be provided without the need for a physical exam.  This service lets us provide the care you need with a short phone conversation.  If a prescription is necessary we can send it directly to your pharmacy.  If lab work is needed we can place an order for that and you can then stop by our lab to have the test done at a later time.    Telephone visits are billed at different rates depending on your insurance coverage. During this emergency period, for some insurers they may be billed the same as an in-person visit.  Please reach out to your insurance provider with any questions.    If during the course of the call the physician/provider feels a telephone visit is not appropriate, you will not be charged for this service.\"    Patient has given verbal consent for Telephone visit?  Yes    What phone number would you like to be contacted at? [phone]     How would you like to obtain your AVS? Mail a copy    Subjective     Abi Lees is a 74 year old female who presents via phone visit today for the following health issues:    HPI  Hyperlipidemia Follow-Up   - Atenolol 25 mg, 1 tablet daily  - Lipitor 40 mg, 1 tablet daily       Are you regularly taking any medication or supplement to lower your cholesterol?   Yes- no issues    Are you having muscle aches or other side effects that you think could be caused by your cholesterol lowering medication?  No    Hypertension Follow-up   - Hydrochlorothiazide 25 mg, 1 tablet daily   - Lisinopril 40 mg, 1 tablet daily      Do you check your blood pressure regularly outside of the clinic? No     Are you following a low salt diet? Yes    Are your blood pressures ever more than 140 on the top number " (systolic) OR more   than 90 on the bottom number (diastolic), for example 140/90? No      How many servings of fruits and vegetables do you eat daily?  2-3    On average, how many sweetened beverages do you drink each day (Examples: soda, juice, sweet tea, etc.  Do NOT count diet or artificially sweetened beverages)?   0    How many days per week do you exercise enough to make your heart beat faster? 3 or less    How many minutes a day do you exercise enough to make your heart beat faster? 20 - 29    How many days per week do you miss taking your medication? 0         Medication Followup of Gabapentin 300 mg tablet     Taking Medication as prescribed: yes    Side Effects:  None    Medication Helping Symptoms:  yes       Current Outpatient Medications   Medication Sig Dispense Refill     aspirin 81 MG tablet Take  by mouth daily.       atenolol (TENORMIN) 25 MG tablet Take 1 tablet (25 mg) by mouth daily 90 tablet 3     atorvastatin (LIPITOR) 40 MG tablet Take 1 tablet (40 mg) by mouth daily Take at bedtime 90 tablet 3     Cholecalciferol (VITAMIN D-3 PO)        gabapentin (NEURONTIN) 300 MG capsule TAKE ONE CAPSULE BY MOUTH EVERY MORNING, TWO CAPSULES AT 2 P.M. AND ONE CAPSULE EVERY NIGHT AT BEDTIME. 360 capsule 3     hydrochlorothiazide (HYDRODIURIL) 25 MG tablet Take 1 tablet (25 mg) by mouth daily 90 tablet 3     lisinopril (ZESTRIL) 40 MG tablet Take 1 tablet (40 mg) by mouth daily 90 tablet 3     naproxen sodium 220 MG capsule Take 220 mg by mouth 2 times daily (with meals)       No Known Allergies    Reviewed and updated as needed this visit by Provider        Phone call visit completed due to COVID-19 outbreak.     Needs to have refills of medications. Does not check blood pressure at home. When goes in blood pressure is always well controlled. No side effects from medications that is aware of. Is occasionally getting camila horses in legs.       Continues to take gabapentin. Does feels some heat in legs,  numbness ankle to knee, but the tingling has gone away. Father did have MS. Numbness to both legs from ankle to knee. This occurs just in the AM. Will get better during the day. This started about 6 mo. Some mornings legs are red but that is just there in the AM. Has seen neurology in the past. Has been going to chiro and feels like that is what really helped the prickling in the feet.     Declines mammogram     Review of Systems   Respiratory: Negative for chest tightness and shortness of breath.    Cardiovascular: Negative for chest pain, palpitations and leg swelling.   Neurological: Positive for numbness. Negative for dizziness, tremors, weakness and light-headedness.          Objective           Assessment/Plan:  1. Essential hypertension with goal blood pressure less than 140/90  Blood pressure well controlled.  Was set up for fasting lab appointment.  We will set up for CMA appointment to check blood pressure.  Refills given.  - Comprehensive metabolic panel; Future  - Lipid panel reflex to direct LDL Fasting; Future  - Albumin Random Urine Quantitative with Creat Ratio; Future  - lisinopril (ZESTRIL) 40 MG tablet; Take 1 tablet (40 mg) by mouth daily  Dispense: 90 tablet; Refill: 3  - hydrochlorothiazide (HYDRODIURIL) 25 MG tablet; Take 1 tablet (25 mg) by mouth daily  Dispense: 90 tablet; Refill: 3  - atenolol (TENORMIN) 25 MG tablet; Take 1 tablet (25 mg) by mouth daily  Dispense: 90 tablet; Refill: 3  - TSH with free T4 reflex; Future    2. Neuropathy  Improved with use of gabapentin.  Does have increasing numbness.  Discussed referral back to neurology-declines.  Would be willing to place in future if changes mind.  - gabapentin (NEURONTIN) 300 MG capsule; TAKE ONE CAPSULE BY MOUTH EVERY MORNING, TWO CAPSULES AT 2 P.M. AND ONE CAPSULE EVERY NIGHT AT BEDTIME.  Dispense: 360 capsule; Refill: 3    3. Hyperlipidemia LDL goal <130  We will set up for fasting lab appointment.  Refills given.  Plan to follow-up  in 1 year or sooner  - atorvastatin (LIPITOR) 40 MG tablet; Take 1 tablet (40 mg) by mouth daily Take at bedtime  Dispense: 90 tablet; Refill: 3   Comprehensive metabolic panel; Future  - Lipid panel reflex to direct LDL Fasting; Future    Return in about 1 year (around 7/3/2021) for A BP Recheck.      Phone call duration:  11 minutes    EMORY Mcgee CNP

## 2020-07-03 ENCOUNTER — VIRTUAL VISIT (OUTPATIENT)
Dept: FAMILY MEDICINE | Facility: CLINIC | Age: 75
End: 2020-07-03
Payer: COMMERCIAL

## 2020-07-03 DIAGNOSIS — G62.9 NEUROPATHY: ICD-10-CM

## 2020-07-03 DIAGNOSIS — I10 ESSENTIAL HYPERTENSION WITH GOAL BLOOD PRESSURE LESS THAN 140/90: ICD-10-CM

## 2020-07-03 DIAGNOSIS — E78.5 HYPERLIPIDEMIA LDL GOAL <130: ICD-10-CM

## 2020-07-03 PROCEDURE — 99214 OFFICE O/P EST MOD 30 MIN: CPT | Mod: 95 | Performed by: NURSE PRACTITIONER

## 2020-07-03 RX ORDER — ATENOLOL 25 MG/1
25 TABLET ORAL DAILY
Qty: 90 TABLET | Refills: 3 | Status: SHIPPED | OUTPATIENT
Start: 2020-07-03 | End: 2021-06-17

## 2020-07-03 RX ORDER — ATORVASTATIN CALCIUM 40 MG/1
40 TABLET, FILM COATED ORAL DAILY
Qty: 90 TABLET | Refills: 3 | Status: SHIPPED | OUTPATIENT
Start: 2020-07-03 | End: 2021-06-17

## 2020-07-03 RX ORDER — GABAPENTIN 300 MG/1
CAPSULE ORAL
Qty: 360 CAPSULE | Refills: 3 | Status: SHIPPED | OUTPATIENT
Start: 2020-07-03 | End: 2021-05-17

## 2020-07-03 RX ORDER — HYDROCHLOROTHIAZIDE 25 MG/1
25 TABLET ORAL DAILY
Qty: 90 TABLET | Refills: 3 | Status: SHIPPED | OUTPATIENT
Start: 2020-07-03 | End: 2021-06-30

## 2020-07-03 RX ORDER — LISINOPRIL 40 MG/1
40 TABLET ORAL DAILY
Qty: 90 TABLET | Refills: 3 | Status: SHIPPED | OUTPATIENT
Start: 2020-07-03 | End: 2021-06-30

## 2020-07-03 ASSESSMENT — ENCOUNTER SYMPTOMS
SHORTNESS OF BREATH: 0
DIZZINESS: 0
NUMBNESS: 1
TREMORS: 0
LIGHT-HEADEDNESS: 0
CHEST TIGHTNESS: 0
PALPITATIONS: 0
WEAKNESS: 0

## 2020-07-20 ENCOUNTER — TRANSFERRED RECORDS (OUTPATIENT)
Dept: HEALTH INFORMATION MANAGEMENT | Facility: CLINIC | Age: 75
End: 2020-07-20

## 2021-03-19 NOTE — MR AVS SNAPSHOT
After Visit Summary   7/9/2018    Abi Lees    MRN: [id number]           Patient Information     Date Of Birth          1945        Visit Information        Provider Department      7/9/2018 10:40 AM Abigail Stevens APRN CNP [facility]        Today's Diagnoses     Essential hypertension with goal blood pressure less than 140/90    -  1    Hyperlipidemia LDL goal <130        Neuropathy        Sensory neuropathy        Encounter for screening mammogram for malignant neoplasm of breast        Need for hepatitis C screening test          Care Instructions    No change with medication      keep exercising     Stay well hydrated - urine should be clear.                Follow-ups after your visit        Future tests that were ordered for you today     Open Future Orders        Priority Expected Expires Ordered    MA Screening Digital Bilateral Routine  7/10/2019 7/9/2018            Who to contact     Normal or non-critical lab and imaging results will be communicated to you by MyChart, letter or phone within 4 business days after the clinic has received the results. If you do not hear from us within 7 days, please contact the clinic through MyChart or phone. If you have a critical or abnormal lab result, we will notify you by phone as soon as possible.  Submit refill requests through [employer] or call your pharmacy and they will forward the refill request to us. Please allow 3 business days for your refill to be completed.          If you need to speak with a  for additional information , please call: [phone]           Additional Information About Your Visit        Care EveryWhere ID     This is your Care EveryWhere ID. This could be used by other organizations to access your [facility] medical records  [id number]        Your Vitals Were     Pulse Temperature Pulse Oximetry BMI (Body Mass Index)          68 97.9  F (36.6  C) (Oral) 100% 25.63 kg/m2          Hospital Medicine  Consult History & Physical        Chief Complaint: Postop medical management    Date of Service: Pt seen/examined in consultation on 3/19/1021    History Of Present Illness:      48 y.o. male who we are asked to see/evaluate by Mary Hassan MD for postop medical management. Patient with history of obesity, hyperlipidemia admitted for C3-4, C4-5, C5-6, C6-7, T1-T2 FUSION AND FIXATION, C6-C7, T1-T2 DECOMPRESSION      patient was seen and examined today still complaining of pain in his neck 8, muscle tightness in his both shoulder have low back pain at level of 3. Patient denies any numbness or tingling anywhere in the body. He said he had a numbness in his thumb which is getting better  Past Medical History:        Diagnosis Date    Hyperlipidemia     Sleep apnea     uses cpap       Past Surgical History:        Procedure Laterality Date    BACK SURGERY  2018    fusion  c5-6    CERVICAL FUSION N/A 3/18/2021    C3-4, C4-5, C5-6, C6-7, T1-T2 FUSION AND FIXATION, C6-C7 DECOMPRESSION performed by Mary Hassan MD at [address] [place] [address]         Medications Prior to Admission:    Prior to Admission medications    Medication Sig Start Date End Date Taking? Authorizing Provider   citalopram (CELEXA) 40 MG tablet Take 40 mg by mouth daily 5/22/20  Yes Historical Provider, MD   loratadine (CLARITIN) 10 MG tablet CLARITIN TABS 2/26/21  Yes Historical Provider, MD   gabapentin (NEURONTIN) 300 MG capsule Take 300 mg by mouth 3 times daily.    Yes Historical Provider, MD   fenofibrate (TRIGLIDE) 160 MG tablet Take 160 mg by mouth daily 5/22/20  Yes Historical Provider, MD   mometasone (NASONEX) 50 MCG/ACT nasal spray 2 sprays by Nasal route   Yes Historical Provider, MD   omeprazole ([facility] OMEPRAZOLE) 20 MG EC tablet CVS OMEPRAZOLE 20 MG TBEC 2/26/21  Yes Historical Provider, MD   Multiple Vitamins-Minerals (THERAPEUTIC MULTIVITAMIN-MINERALS) tablet Take 1 tablet by mouth daily   Yes Historical Blood Pressure from Last 3 Encounters:   07/09/18 122/66   08/24/17 120/67   07/24/17 116/64    Weight from Last 3 Encounters:   07/09/18 147 lb (66.7 kg)   08/24/17 151 lb (68.5 kg)   07/24/17 151 lb (68.5 kg)              We Performed the Following     **Hepatitis C Screen Reflex to RNA FUTURE anytime     Albumin Random Urine Quantitative with Creat Ratio     Comprehensive metabolic panel     Lipid panel reflex to direct LDL Fasting          Where to get your medicines      These medications were sent to [facility] [address] [facility]  [address] [facility] [address]     Phone:  [phone]     atenolol 25 MG tablet    atorvastatin 40 MG tablet    hydrochlorothiazide 25 MG tablet         Some of these will need a paper prescription and others can be bought over the counter.  Ask your nurse if you have questions.     Bring a paper prescription for each of these medications     gabapentin 300 MG capsule          Primary Care Provider Office Phone # Fax #    Abigail Stevens, APRN [facility] [phone] [address] [facility] [address] [facility]   [place] [facility] [address]        Equal Access to Services     ABRAHAN REHMAN AH: Hadii aad ku hadasho Soomaali, waaxda luqadaha, qaybta kaalmada adeegyada, prosper garcia hayat butler . So [facility] [phone].    ATENCIÓN: Si habla español, tiene a isidro disposición servicios gratuitos de asistencia lingüística. [facility] [phone].    We comply with applicable federal civil rights laws and [place] laws. We do not discriminate on the basis of race, color, national origin, age, disability, sex, sexual orientation, or gender identity.            Thank you!     Thank you for choosing [facility]  for your care. Our goal is always to provide you with excellent care. Hearing back from our patients is one way we can continue to improve our services. Please take a few minutes to complete the written survey that you may receive  Provider, MD       Allergies:  Simvastatin    Social History:      The patient currently lives home    TOBACCO:   reports that he has never smoked. He has never used smokeless tobacco.  ETOH:   reports current alcohol use. Family History:      Reviewed in detail and negative for DM, CAD, Cancer, CVA. Positive as follows:    History reviewed. No pertinent family history. REVIEW OF SYSTEMS:   Pertinent positives as noted in the HPI. All other systems reviewed and negative. PHYSICAL EXAM PERFORMED:  /81   Pulse 89   Temp 98.2 °F (36.8 °C) (Oral)   Resp 16   Ht 5' 11\" (1.803 m)   Wt 290 lb (131.5 kg)   SpO2 92%   BMI 40.45 kg/m²   General appearance: No apparent distress, appears stated age and cooperative. HEENT: Normal cephalic, atraumatic without obvious deformity. Pupils equal, round, and reactive to light. Extra ocular muscles intact. Conjunctivae/corneas clear. Neck: Supple, with full range of motion. No jugular venous distention. Trachea midline. Respiratory:  Normal respiratory effort. Clear to auscultation, bilaterally without Rales/Wheezes/Rhonchi. Cardiovascular: Regular rate and rhythm with normal S1/S2 without murmurs, rubs or gallops. Abdomen: Soft, non-tender, non-distended with normal bowel sounds. Musculoskeletal: Back surgical site C/D/I has two drain in place  Skin: Skin color, texture, turgor normal.  No rashes or lesions. Neurologic:  Neurovascularly intact without any focal sensory/motor deficits.  Cranial nerves: II-XII intact, grossly non-focal.  Psychiatric: Alert and oriented, thought content appropriate, normal insight  Capillary Refill: Brisk,< 3 seconds   Peripheral Pulses: +2 palpable, equal bilaterally     Labs:     Recent Labs     03/19/21  0503   WBC 14.0*   HGB 13.0*   HCT 38.0*        Recent Labs     03/19/21  0503   *   K 4.2   CL 99   CO2 23   BUN 11   CREATININE 0.7*   CALCIUM 8.8     No results for input(s): AST, ALT, BILIDIR, BILITOT, ALKPHOS in the last 72 hours. Recent Labs     03/18/21  1045   INR 1.09     No results for input(s): Josie Pedersen in the last 72 hours. Urinalysis:  No results found for: April Hernadez, BACTERIA, [address] [facility], B[place], [address], [address] [facility] [address]    Radiology: I have reviewed the radiology reports with the following interpretation:     XR CERVICAL SPINE (2-3 VIEWS)   Final Result   Impression: Postsurgical changes of the cervical spine. CT GUIDED STEREOTACTIC LOCALIZATION   Final Result   1. Intraoperative CT imaging obtained for stereotactic intraoperative localization purposes as described. No acute complication identified. CT GUIDED STEREOTACTIC LOCALIZATION   Final Result   1. Intraoperative CT imaging obtained for stereotactic intraoperative localization purposes as described. No acute complication identified. EKG:  I have reviewed the EKG with the following interpretation:    @ekgread@      ASSESSMENT:    Active Hospital Problems    Diagnosis Date Noted    Cervical spondylosis with radiculopathy [M47.22] 03/18/2021     #Elevated blood pressure likely due to pain  #Hyperlipidemia  #NICKI on CPAP  #S/p back surgery POD 1    PLAN:  Check CBC, BMP tomorrow. Pain control with Robaxin and gabapentin. No need to treat her blood pressure at this point. Will monitor. Bowel care. DVT prophylaxis per primary    DVT Prophylaxis: Heparin  Diet: DIET GENERAL;  Code Status: Full Code    PT/OT Eval Status: Active and ongoing    Dispo -per primary.     Thank you for the consultation, will follow up as needed    This chart was likely completed using voice recognition technology and may contain unintended grammatical , phraseology,and/or punctuation errors    Ann-Marie Damon MD in the mail after your visit with us. Thank you!             Your Updated Medication List - Protect others around you: Learn how to safely use, store and throw away your medicines at www.disposemymeds.org.          This list is accurate as of 7/9/18 11:37 AM.  Always use your most recent med list.                   Brand Name Dispense Instructions for use Diagnosis    aspirin 81 MG tablet      Take  by mouth daily.    Bilateral foot pain       atenolol 25 MG tablet    TENORMIN    90 tablet    TAKE ONE TABLET BY MOUTH EVERY DAY    Neuropathy, Hyperlipidemia LDL goal <130       atorvastatin 40 MG tablet    LIPITOR    90 tablet    Take 1 tablet (40 mg) by mouth daily    Hyperlipidemia LDL goal <130       gabapentin 300 MG capsule    NEURONTIN    120 capsule    300mg Qam, 600mg at 2pm and 300mg QHS. Due for appointment before next refill    Sensory neuropathy       hydrochlorothiazide 25 MG tablet    HYDRODIURIL    90 tablet    Take 1 tablet (25 mg) by mouth daily    Neuropathy, Hyperlipidemia LDL goal <130       lisinopril 40 MG tablet    PRINIVIL/ZESTRIL    90 tablet    Take 1 tablet (40 mg) by mouth daily Needs appointment and labs before next refill    Essential hypertension with goal blood pressure less than 140/90       order for DME     1 Units    Equipment being ordered: Right wrist splint (carpal tunnel syndrome)    Carpal tunnel syndrome of right wrist       VITAMIN D-3 PO

## 2021-05-17 DIAGNOSIS — G62.9 NEUROPATHY: ICD-10-CM

## 2021-05-17 RX ORDER — GABAPENTIN 300 MG/1
CAPSULE ORAL
Qty: 360 CAPSULE | Refills: 0 | Status: SHIPPED | OUTPATIENT
Start: 2021-05-17 | End: 2021-06-30

## 2021-05-17 NOTE — TELEPHONE ENCOUNTER
Routing refill request to provider for review/approval because:  Drug not on the [facility] refill protocol   Patient is planning to have a physical in July when she was advised to follow up/    Thank you,  Ramona Bender RN

## 2021-05-17 NOTE — TELEPHONE ENCOUNTER
Hi,    We sent a request over for Gabapentin and it was denied because the provider said the patient should have a prescription on file, however I do not see any active prescriptions on file for this medication. Please verify and send us a new prescription. Thanks!    Klaudia Navarrete, [facility] [place]

## 2021-06-15 DIAGNOSIS — I10 ESSENTIAL HYPERTENSION WITH GOAL BLOOD PRESSURE LESS THAN 140/90: ICD-10-CM

## 2021-06-15 DIAGNOSIS — E78.5 HYPERLIPIDEMIA LDL GOAL <130: ICD-10-CM

## 2021-06-17 RX ORDER — ATENOLOL 25 MG/1
25 TABLET ORAL DAILY
Qty: 90 TABLET | Refills: 0 | Status: SHIPPED | OUTPATIENT
Start: 2021-06-17 | End: 2021-06-30

## 2021-06-17 RX ORDER — ATORVASTATIN CALCIUM 40 MG/1
40 TABLET, FILM COATED ORAL DAILY
Qty: 90 TABLET | Refills: 0 | Status: SHIPPED | OUTPATIENT
Start: 2021-06-17 | End: 2021-06-30

## 2021-06-30 ENCOUNTER — OFFICE VISIT (OUTPATIENT)
Dept: FAMILY MEDICINE | Facility: CLINIC | Age: 76
End: 2021-06-30
Payer: COMMERCIAL

## 2021-06-30 VITALS
DIASTOLIC BLOOD PRESSURE: 64 MMHG | TEMPERATURE: 98.8 F | RESPIRATION RATE: 16 BRPM | HEIGHT: 63 IN | BODY MASS INDEX: 25.27 KG/M2 | SYSTOLIC BLOOD PRESSURE: 120 MMHG | WEIGHT: 142.6 LBS | HEART RATE: 68 BPM

## 2021-06-30 DIAGNOSIS — E78.5 HYPERLIPIDEMIA LDL GOAL <130: ICD-10-CM

## 2021-06-30 DIAGNOSIS — Z78.0 POST-MENOPAUSAL: ICD-10-CM

## 2021-06-30 DIAGNOSIS — I10 ESSENTIAL HYPERTENSION WITH GOAL BLOOD PRESSURE LESS THAN 140/90: ICD-10-CM

## 2021-06-30 DIAGNOSIS — G62.9 NEUROPATHY: ICD-10-CM

## 2021-06-30 DIAGNOSIS — R63.4 WEIGHT LOSS: Primary | ICD-10-CM

## 2021-06-30 DIAGNOSIS — R29.6 RECURRENT FALLS: ICD-10-CM

## 2021-06-30 DIAGNOSIS — Z12.31 ENCOUNTER FOR SCREENING MAMMOGRAM FOR BREAST CANCER: ICD-10-CM

## 2021-06-30 DIAGNOSIS — Z12.11 SCREEN FOR COLON CANCER: ICD-10-CM

## 2021-06-30 PROCEDURE — 99215 OFFICE O/P EST HI 40 MIN: CPT | Performed by: NURSE PRACTITIONER

## 2021-06-30 RX ORDER — GABAPENTIN 300 MG/1
CAPSULE ORAL
Qty: 270 CAPSULE | Refills: 3 | Status: SHIPPED | OUTPATIENT
Start: 2021-06-30 | End: 2021-07-12

## 2021-06-30 RX ORDER — HYDROCHLOROTHIAZIDE 25 MG/1
25 TABLET ORAL DAILY
Qty: 90 TABLET | Refills: 3 | Status: SHIPPED | OUTPATIENT
Start: 2021-06-30 | End: 2022-08-24

## 2021-06-30 RX ORDER — LISINOPRIL 40 MG/1
40 TABLET ORAL DAILY
Qty: 90 TABLET | Refills: 3 | Status: SHIPPED | OUTPATIENT
Start: 2021-06-30 | End: 2022-09-07

## 2021-06-30 RX ORDER — ATORVASTATIN CALCIUM 40 MG/1
40 TABLET, FILM COATED ORAL DAILY
Qty: 90 TABLET | Refills: 3 | Status: SHIPPED | OUTPATIENT
Start: 2021-06-30 | End: 2022-09-14

## 2021-06-30 RX ORDER — ATENOLOL 25 MG/1
25 TABLET ORAL DAILY
Qty: 90 TABLET | Refills: 3 | Status: SHIPPED | OUTPATIENT
Start: 2021-06-30 | End: 2022-09-07

## 2021-06-30 ASSESSMENT — ENCOUNTER SYMPTOMS
NAUSEA: 0
SHORTNESS OF BREATH: 0
SLEEP DISTURBANCE: 0
DIZZINESS: 0
DYSPHORIC MOOD: 0
DIARRHEA: 1
COUGH: 0
ABDOMINAL PAIN: 0
FATIGUE: 0
PALPITATIONS: 0
NERVOUS/ANXIOUS: 0
LIGHT-HEADEDNESS: 0
CHEST TIGHTNESS: 0
HEADACHES: 0
SORE THROAT: 0
CONSTIPATION: 0
WHEEZING: 0
RHINORRHEA: 0
ABDOMINAL DISTENTION: 0
UNEXPECTED WEIGHT CHANGE: 1
ARTHRALGIAS: 0
MYALGIAS: 0
VOMITING: 0
NUMBNESS: 0

## 2021-06-30 ASSESSMENT — MIFFLIN-ST. JEOR: SCORE: 1108.02

## 2021-06-30 ASSESSMENT — PAIN SCALES - GENERAL: PAINLEVEL: NO PAIN (0)

## 2021-06-30 NOTE — PROGRESS NOTES
Assessment & Plan     Essential hypertension with goal blood pressure less than 140/90  Stable-blood pressure well controlled today in clinic.  Refills given.  We will set up for fasting lab appointment.  Plan to follow-up in clinic in 1 year or sooner if needed  - REVIEW OF HEALTH MAINTENANCE PROTOCOL ORDERS  - atenolol (TENORMIN) 25 MG tablet; Take 1 tablet (25 mg) by mouth daily  - hydrochlorothiazide (HYDRODIURIL) 25 MG tablet; Take 1 tablet (25 mg) by mouth daily  - lisinopril (ZESTRIL) 40 MG tablet; Take 1 tablet (40 mg) by mouth daily    Hyperlipidemia LDL goal <130  Most recent lipids reviewed.  Tolerating atorvastatin well.  We will set up for fasting lab appointment.  Plan to follow-up in 1 year or sooner if needed  - atorvastatin (LIPITOR) 40 MG tablet; Take 1 tablet (40 mg) by mouth daily    Neuropathy  Burning sensation has improved.  We will plan to gradually decrease gabapentin.  New prescription sent.  To send [employer] message in 1 month with update on how is doing.  - gabapentin (NEURONTIN) 300 MG capsule; TAKE ONE CAPSULE BY MOUTH EVERY MORNING, ONE CAPSULES AT 2 P.M. AND ONE CAPSULE EVERY NIGHT AT BEDTIME.    Weight loss  Recommend routine screenings-agreeable    Recurrent falls  Much education given.    Screen for colon cancer  - GASTROENTEROLOGY ADULT REF PROCEDURE ONLY; Future    Encounter for screening mammogram for breast cancer  - MA SCREENING DIGITAL BILAT - Future  (s+30); Future    Post-menopausal  Encouraged 1200 mg of calcium carbonate orally daily.  - DX Hip/Pelvis/Spine; Future    Declines Tdap vaccine, shingles vaccine and pneumonia vaccine.    Review of external notes as documented elsewhere in note  Review of the result(s) of each unique test - BMP, urine microalbumin, CMP, lipid  Ordering of each unique test  Prescription drug management  43 minutes spent on the date of the encounter doing chart review, history and exam, documentation and further activities per the note    "  BMI:   Estimated body mass index is 25.67 kg/m  as calculated from the following:    Height as of this encounter: 1.588 m (5' 2.5\").    Weight as of this encounter: 64.7 kg (142 lb 9.6 oz).       Return in about 1 year (around 6/30/2022) for A Routine Visit.    EMORY Mcgee CNP  [facility] JOHN Alex is a 75 year old who presents for the following health issues     HPI     Chief Complaint   Patient presents with     Recheck Medication     pt is not fasting     Has been falling more. Balance feels off, has to catch herself.     Hyperlipidemia Follow-Up      Are you regularly taking any medication or supplement to lower your cholesterol?   Yes- atorvastatin 40 mg    Are you having muscle aches or other side effects that you think could be caused by your cholesterol lowering medication?  Yes- cramping in calves    Hypertension Follow-up      Do you check your blood pressure regularly outside of the clinic? No     Are you following a low salt diet? Yes    Are your blood pressures ever more than 140 on the top number (systolic) OR more   than 90 on the bottom number (diastolic), for example 140/90? Not checking      How many servings of fruits and vegetables do you eat daily?  2-3    On average, how many sweetened beverages do you drink each day (Examples: soda, juice, sweet tea, etc.  Do NOT count diet or artificially sweetened beverages)?   0    How many days per week do you exercise enough to make your heart beat faster? 3 or less    How many minutes a day do you exercise enough to make your heart beat faster? 60 or more    How many days per week do you miss taking your medication? 0    Medication Followup of gabapentin 300 mg    Taking Medication as prescribed: yes    Side Effects:  None    Medication Helping Symptoms:  yes       Here today for medication refills.  Does not check blood pressure out in the community.  Has been taking all medications and tolerating well.  No " side effects that is aware of.  Denies chest pain, palpitations, shortness of breath    Has been feeling more off balance. Has been going to [place] for over a year. Most recently and ice cube melted on floor and did not realize it.  Slipped, feet went out from under her.  Did not hit head.  Yesterday, fell into flower bed. Thinks walks too fast. Has stepping stones and missed one and thinks that is how fell.  No injury from falls.    Since has been going to [employer]. The pins and needles in feet is better. No burning. Heat is still there, but burning is better.  Has been taking gabapentin and feels like that works well.    2 mo ago, had diarrhea for 2-3 days straight. In that time did lose 8 pounds. Not really trying to lose weight. Is not able to put weight back on. Occasional abdomen pain that is sharp.  Will be there only briefly.  Continues to have intermittent diarrhea.  Has never had a colonoscopy.  No family history of colon cancer.      Review of Systems   Constitutional: Positive for unexpected weight change (loss). Negative for fatigue.   HENT: Negative for ear pain, rhinorrhea and sore throat.    Eyes: Negative for visual disturbance.   Respiratory: Negative for cough, chest tightness, shortness of breath and wheezing.    Cardiovascular: Negative for chest pain and palpitations.   Gastrointestinal: Positive for diarrhea. Negative for abdominal distention, abdominal pain, constipation, nausea and vomiting.   Endocrine: Negative for cold intolerance and heat intolerance.   Musculoskeletal: Negative for arthralgias and myalgias.   Skin: Negative for rash.   Neurological: Negative for dizziness, light-headedness, numbness and headaches.   Psychiatric/Behavioral: Negative for dysphoric mood and sleep disturbance. The patient is not nervous/anxious.           Objective    /64 (BP Location: Left arm, Patient Position: Sitting, Cuff Size: Adult Regular)   Pulse 68   Temp 98.8  F (37.1  C) (Tympanic)   Resp  "16   Ht 1.588 m (5' 2.5\")   Wt 64.7 kg (142 lb 9.6 oz)   BMI 25.67 kg/m    Body mass index is 25.67 kg/m .  Physical Exam  Constitutional:       Appearance: Normal appearance. She is well-developed.   HENT:      Head: Normocephalic and atraumatic.      Right Ear: Tympanic membrane and external ear normal. No middle ear effusion.      Left Ear: Tympanic membrane and external ear normal.  No middle ear effusion.      Nose: No mucosal edema.   Neck:      Thyroid: No thyromegaly.      Vascular: No carotid bruit.   Cardiovascular:      Rate and Rhythm: Normal rate and regular rhythm.      Heart sounds: Normal heart sounds.   Pulmonary:      Effort: Pulmonary effort is normal.      Breath sounds: Normal breath sounds.   Abdominal:      General: Bowel sounds are normal.      Palpations: Abdomen is soft.   Skin:     General: Skin is warm and dry.   Neurological:      Mental Status: She is alert.   Psychiatric:         Behavior: Behavior normal.                "

## 2021-06-30 NOTE — PATIENT INSTRUCTIONS
Increase Calcium to 1200 total milligrams per day.    Decrease Gabapentin to 1 tablet 3 times per day.  Please send a [facility] [employer] message in 2 to 4 weeks with update on how you are doing.    You can call imaging scheduling to set up appointment date, time, and location that works best for you to have your mammogram and bone density test. [phone]     These are general instructions and may not be specific to you. Please call, email or follow up if you have any questions or concerns.       Patient Education     Fall Prevention  Falls often occur due to slipping, tripping or losing your balance. Millions of people fall every year and injure themselves. Here are ways to reduce your risk of falling again.     Think about your fall, was there anything that caused your fall that can be fixed, removed, or replaced?    Make your home safe by keeping walkways clear of objects you may trip over, such as electric cords.    Use non-slip pads under rugs. Don't use area rugs or small throw rugs.    Use non-slip mats in bathtubs and showers.    Install handrails and lights on staircases. The handrails should be on both sides of the stairs.    Don't walk in poorly lit areas.    Don't stand on chairs or wobbly ladders.    Use caution when reaching overhead or looking upward. This position can cause a loss of balance.    Be sure your shoes fit properly, have non-slip bottoms and are in good condition.     Wear shoes both inside and out. Don't go barefoot or wear slippers.    Be cautious when going up and down stairs, curbs, and when walking on uneven sidewalks.    If your balance is poor, consider using a cane or walker.    If your fall was related to alcohol use, stop or limit alcohol intake.     If your fall was related to use of sleeping medicines, talk to your healthcare provider about this. You may need to reduce your dosage at bedtime if you awaken during the night to go to the bathroom.      To reduce the need for nighttime  bathroom trips:  ? Don't drink fluids for several hours before going to bed  ? Empty your bladder before going to bed  ? Men can keep a urinal at the bedside    Stay as active as you can. Balance, flexibility, strength, and endurance all come from exercise. They all play a role in preventing falls. Ask your healthcare provider which types of activity are right for you.    Get your vision checked on a regular basis.    If you have pets, know where they are before you stand up or walk so you don't trip over them.    Use night lights.    Go over all your medicines with a pharmacist or other healthcare provider to see if any of them could make you more likely to fall.  [employer] last reviewed this educational content on 4/1/2018 2000-2021 [employer]. All rights reserved. This information is not intended as a substitute for professional medical care. Always follow your healthcare professional's instructions.

## 2021-07-05 DIAGNOSIS — Z11.59 ENCOUNTER FOR SCREENING FOR OTHER VIRAL DISEASES: Primary | ICD-10-CM

## 2021-07-09 ENCOUNTER — MYC MEDICAL ADVICE (OUTPATIENT)
Dept: FAMILY MEDICINE | Facility: CLINIC | Age: 76
End: 2021-07-09

## 2021-07-09 DIAGNOSIS — G62.9 NEUROPATHY: ICD-10-CM

## 2021-07-12 RX ORDER — GABAPENTIN 300 MG/1
300 CAPSULE ORAL 4 TIMES DAILY
Qty: 270 CAPSULE | Refills: 1 | Status: SHIPPED | OUTPATIENT
Start: 2021-07-12 | End: 2021-07-13

## 2021-07-13 ENCOUNTER — TELEPHONE (OUTPATIENT)
Dept: LAB | Facility: CLINIC | Age: 76
End: 2021-07-13

## 2021-07-13 DIAGNOSIS — G62.9 NEUROPATHY: ICD-10-CM

## 2021-07-13 RX ORDER — GABAPENTIN 300 MG/1
300 CAPSULE ORAL 4 TIMES DAILY
Qty: 360 CAPSULE | Refills: 1 | Status: SHIPPED | OUTPATIENT
Start: 2021-07-13 | End: 2022-02-09

## 2021-07-13 NOTE — TELEPHONE ENCOUNTER
Abi's prescription for gabapentin was sent over on 7/12/21 for a quantity of 270.  Since she is using this 4 times a day, this comes out to a 68 day supply.  Did you intend to send a 90 day supply?    Can you please look at this, and resend for 360 if you intended for this to be a 90 day supply instead.    Please call the pharmacy at [phone] with questions.    Thanks.

## 2021-07-16 ENCOUNTER — TELEPHONE (OUTPATIENT)
Dept: FAMILY MEDICINE | Facility: CLINIC | Age: 76
End: 2021-07-16

## 2021-07-16 ENCOUNTER — LAB (OUTPATIENT)
Dept: LAB | Facility: CLINIC | Age: 76
End: 2021-07-16
Payer: COMMERCIAL

## 2021-07-16 DIAGNOSIS — I10 ESSENTIAL HYPERTENSION WITH GOAL BLOOD PRESSURE LESS THAN 140/90: ICD-10-CM

## 2021-07-16 DIAGNOSIS — E78.5 HYPERLIPIDEMIA LDL GOAL <130: ICD-10-CM

## 2021-07-16 LAB
ALBUMIN SERPL-MCNC: 3.7 G/DL (ref 3.4–5)
ALP SERPL-CCNC: 95 U/L (ref 40–150)
ALT SERPL W P-5'-P-CCNC: 24 U/L (ref 0–50)
ANION GAP SERPL CALCULATED.3IONS-SCNC: 4 MMOL/L (ref 3–14)
AST SERPL W P-5'-P-CCNC: 21 U/L (ref 0–45)
BASOPHILS # BLD AUTO: 0 10E3/UL (ref 0–0.2)
BASOPHILS NFR BLD AUTO: 1 %
BILIRUB SERPL-MCNC: 0.4 MG/DL (ref 0.2–1.3)
BUN SERPL-MCNC: 20 MG/DL (ref 7–30)
CALCIUM SERPL-MCNC: 9 MG/DL (ref 8.5–10.1)
CHLORIDE BLD-SCNC: 106 MMOL/L (ref 94–109)
CHOLEST SERPL-MCNC: 183 MG/DL
CO2 SERPL-SCNC: 30 MMOL/L (ref 20–32)
CREAT SERPL-MCNC: 0.82 MG/DL (ref 0.52–1.04)
CREAT UR-MCNC: 39 MG/DL
EOSINOPHIL # BLD AUTO: 0.1 10E3/UL (ref 0–0.7)
EOSINOPHIL NFR BLD AUTO: 1 %
ERYTHROCYTE [DISTWIDTH] IN BLOOD BY AUTOMATED COUNT: 14 % (ref 10–15)
FASTING STATUS PATIENT QL REPORTED: YES
GFR SERPL CREATININE-BSD FRML MDRD: 71 ML/MIN/1.73M2
GLUCOSE BLD-MCNC: 96 MG/DL (ref 70–99)
HCT VFR BLD AUTO: 35 % (ref 35–47)
HDLC SERPL-MCNC: 52 MG/DL
HGB BLD-MCNC: 10.9 G/DL (ref 11.7–15.7)
LDLC SERPL CALC-MCNC: 105 MG/DL
LYMPHOCYTES # BLD AUTO: 1.9 10E3/UL (ref 0.8–5.3)
LYMPHOCYTES NFR BLD AUTO: 29 %
MCH RBC QN AUTO: 26.8 PG (ref 26.5–33)
MCHC RBC AUTO-ENTMCNC: 31.1 G/DL (ref 31.5–36.5)
MCV RBC AUTO: 86 FL (ref 78–100)
MICROALBUMIN UR-MCNC: 5 MG/DL
MICROALBUMIN/CREAT UR: 12.82 MG/G CR (ref 0–25)
MONOCYTES # BLD AUTO: 0.5 10E3/UL (ref 0–1.3)
MONOCYTES NFR BLD AUTO: 8 %
NEUTROPHILS # BLD AUTO: 4.1 10E3/UL (ref 1.6–8.3)
NEUTROPHILS NFR BLD AUTO: 61 %
NONHDLC SERPL-MCNC: 131 MG/DL
PLATELET # BLD AUTO: 211 10E3/UL (ref 150–450)
POTASSIUM BLD-SCNC: 3.7 MMOL/L (ref 3.4–5.3)
PROT SERPL-MCNC: 7.3 G/DL (ref 6.8–8.8)
RBC # BLD AUTO: 4.07 10E6/UL (ref 3.8–5.2)
SODIUM SERPL-SCNC: 140 MMOL/L (ref 133–144)
TRIGL SERPL-MCNC: 129 MG/DL
TSH SERPL DL<=0.005 MIU/L-ACNC: 0.85 MU/L (ref 0.4–4)
WBC # BLD AUTO: 6.6 10E3/UL (ref 4–11)

## 2021-07-16 PROCEDURE — 82043 UR ALBUMIN QUANTITATIVE: CPT

## 2021-07-16 PROCEDURE — 80061 LIPID PANEL: CPT

## 2021-07-16 PROCEDURE — 80053 COMPREHEN METABOLIC PANEL: CPT

## 2021-07-16 PROCEDURE — 36415 COLL VENOUS BLD VENIPUNCTURE: CPT

## 2021-07-16 PROCEDURE — 85025 COMPLETE CBC W/AUTO DIFF WBC: CPT

## 2021-07-16 PROCEDURE — 84443 ASSAY THYROID STIM HORMONE: CPT

## 2021-07-16 NOTE — TELEPHONE ENCOUNTER
Patient scheduled for fasting lab work this morning. She called because she had forgot and ate about 4-5 raspberries from her garden around 8 AM.     In the absence of Evelyn Yanes CNP, I reviewed concern with Dr. Huddleston. She said that it was ok for patient to come in. If the fasting blood sugar was elevated, an A1C could be added.     The lab also informed of this and marked patient as non-fasting (ok'd by MD).     Abi informed of the above and plans to come in for her lab only visit later this morning.     Mera Galvez RN BSN  [facility]

## 2021-07-22 ENCOUNTER — HOSPITAL ENCOUNTER (OUTPATIENT)
Dept: MAMMOGRAPHY | Facility: CLINIC | Age: 76
Discharge: HOME | End: 2021-07-22
Attending: NURSE PRACTITIONER | Admitting: NURSE PRACTITIONER
Payer: COMMERCIAL

## 2021-07-22 DIAGNOSIS — Z12.31 ENCOUNTER FOR SCREENING MAMMOGRAM FOR BREAST CANCER: ICD-10-CM

## 2021-07-22 PROCEDURE — 77067 SCR MAMMO BI INCL CAD: CPT

## 2021-07-30 ENCOUNTER — LAB (OUTPATIENT)
Dept: LAB | Facility: CLINIC | Age: 76
End: 2021-07-30
Payer: COMMERCIAL

## 2021-07-30 DIAGNOSIS — Z11.59 ENCOUNTER FOR SCREENING FOR OTHER VIRAL DISEASES: ICD-10-CM

## 2021-07-30 PROCEDURE — U0005 INFEC AGEN DETEC AMPLI PROBE: HCPCS

## 2021-07-30 PROCEDURE — U0003 INFECTIOUS AGENT DETECTION BY NUCLEIC ACID (DNA OR RNA); SEVERE ACUTE RESPIRATORY SYNDROME CORONAVIRUS 2 (SARS-COV-2) (CORONAVIRUS DISEASE [COVID-19]), AMPLIFIED PROBE TECHNIQUE, MAKING USE OF HIGH THROUGHPUT TECHNOLOGIES AS DESCRIBED BY CMS-2020-01-R: HCPCS

## 2021-07-31 LAB — SARS-COV-2 RNA RESP QL NAA+PROBE: NEGATIVE

## 2021-08-02 ENCOUNTER — ANESTHESIA EVENT (OUTPATIENT)
Dept: GASTROENTEROLOGY | Facility: CLINIC | Age: 76
End: 2021-08-02

## 2021-08-02 RX ORDER — SODIUM CHLORIDE, SODIUM LACTATE, POTASSIUM CHLORIDE, CALCIUM CHLORIDE 600; 310; 30; 20 MG/100ML; MG/100ML; MG/100ML; MG/100ML
INJECTION, SOLUTION INTRAVENOUS CONTINUOUS
Status: CANCELLED | OUTPATIENT
Start: 2021-08-02

## 2021-08-02 RX ORDER — LIDOCAINE 40 MG/G
CREAM TOPICAL
Status: CANCELLED | OUTPATIENT
Start: 2021-08-02

## 2021-08-02 RX ORDER — ONDANSETRON 2 MG/ML
4 INJECTION INTRAMUSCULAR; INTRAVENOUS
Status: CANCELLED | OUTPATIENT
Start: 2021-08-02

## 2021-08-02 NOTE — ANESTHESIA PREPROCEDURE EVALUATION
Anesthesia Pre-Procedure Evaluation    Patient: Abi Lees   MRN: [id number] : 1945        Preoperative Diagnosis: Screen for colon cancer [Z12.11]   Procedure : Procedure(s):  COLONOSCOPY     Past Medical History:   Diagnosis Date     CAD in native artery     s/p stent placement in  [facility]     HTN (hypertension), benign      Hyperlipidemia      Peripheral neuropathy      Varicose veins     bilateral with thrombophlebitis LLE      Past Surgical History:   Procedure Laterality Date     APPENDECTOMY       COLPORRHAPHY ANTERIOR, POSTERIOR, COMBINED N/A 3/25/2019    Procedure: Anterior and posterior colporrhaphy, vulvar condyloma removal.;  Surgeon: Bj Mackenzie MD;  Location: [facility]     HYSTERECTOMY      abdominal- bleeding     OOPHORECTOMY      unilateral     OTHER SURGICAL HISTORY      LLE Varicose vein RF ablation at [facility]       No Known Allergies   Social History     Tobacco Use     Smoking status: Former Smoker     Packs/day: 0.50     Types: Cigarettes     Quit date: 2007     Years since quittin.0     Smokeless tobacco: Never Used   Substance Use Topics     Alcohol use: Yes     Alcohol/week: 0.0 standard drinks     Comment: rare      Wt Readings from Last 1 Encounters:   21 64.7 kg (142 lb 9.6 oz)        Anesthesia Evaluation            ROS/MED HX  ENT/Pulmonary:     (+) tobacco use,     Neurologic:     (+) peripheral neuropathy,     Cardiovascular:     (+) hypertension--CAD ---    METS/Exercise Tolerance:     Hematologic:       Musculoskeletal:   (+) arthritis,     GI/Hepatic:       Renal/Genitourinary:       Endo:       Psychiatric/Substance Use:       Infectious Disease:       Malignancy:       Other:               OUTSIDE LABS:  CBC:   Lab Results   Component Value Date    WBC 6.6 2021    WBC 8.2 03/15/2016    HGB 10.9 (L) 2021    HGB 12.2 03/15/2019    HCT 35.0 2021    HCT 38.6 03/15/2016     2021     03/15/2016     BMP:   Lab Results    Component Value Date     07/16/2021     03/15/2019    POTASSIUM 3.7 07/16/2021    POTASSIUM 3.9 03/15/2019    CHLORIDE 106 07/16/2021    CHLORIDE 103 03/15/2019    CO2 30 07/16/2021    CO2 29 03/15/2019    BUN 20 07/16/2021    BUN 25 03/15/2019    CR 0.82 07/16/2021    CR 0.86 03/15/2019    GLC 96 07/16/2021     (H) 03/15/2019     COAGS: No results found for: PTT, INR, FIBR  POC: No results found for: BGM, HCG, HCGS  HEPATIC:   Lab Results   Component Value Date    ALBUMIN 3.7 07/16/2021    PROTTOTAL 7.3 07/16/2021    ALT 24 07/16/2021    AST 21 07/16/2021    ALKPHOS 95 07/16/2021    BILITOTAL 0.4 07/16/2021     OTHER:   Lab Results   Component Value Date    ELIJAH 9.0 07/16/2021    TSH 0.85 07/16/2021    CRP  02/09/2016     <0.2  Reference Values:   Low Risk:           <1.0 mg/L   Average Risk:       1.0-3.0 mg/L   High Risk:          >3.0 mg/L   Acute Inflammation: >8.0 mg/L      SED 15 02/09/2016       Anesthesia Plan                        Consents            Postoperative Care            Comments:    After discussing NPO status with patient.  She stated she ate last night (pretzels) and then 1.5 hours prior to procedure patient consumed a a tomato.  She also was confused with prep so did not follow the instructions.  Case to be rescheduled.            EMORY Monte CRNA

## 2021-08-03 ENCOUNTER — HOSPITAL ENCOUNTER (OUTPATIENT)
Facility: CLINIC | Age: 76
Discharge: HOME | End: 2021-08-03
Attending: SURGERY | Admitting: SURGERY
Payer: COMMERCIAL

## 2021-08-03 ENCOUNTER — ANESTHESIA (OUTPATIENT)
Dept: GASTROENTEROLOGY | Facility: CLINIC | Age: 76
End: 2021-08-03

## 2021-08-03 VITALS
BODY MASS INDEX: 27.68 KG/M2 | DIASTOLIC BLOOD PRESSURE: 83 MMHG | OXYGEN SATURATION: 100 % | TEMPERATURE: 98.2 F | SYSTOLIC BLOOD PRESSURE: 151 MMHG | HEART RATE: 69 BPM | WEIGHT: 141 LBS | HEIGHT: 60 IN

## 2021-08-03 PROCEDURE — 999N000141 HC STATISTIC PRE-PROCEDURE NURSING ASSESSMENT: Performed by: SURGERY

## 2021-08-03 ASSESSMENT — LIFESTYLE VARIABLES: TOBACCO_USE: 1

## 2021-08-03 ASSESSMENT — MIFFLIN-ST. JEOR: SCORE: 1060.04

## 2021-08-03 NOTE — H&P
"76 year old year old female here for colonoscopy for screening.    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     Essential hypertension with goal blood pressure less than 140/90       Past Medical History:   Diagnosis Date     CAD in native artery     s/p stent placement in 2007 [facility]     HTN (hypertension), benign      Hyperlipidemia      Peripheral neuropathy      Varicose veins     bilateral with thrombophlebitis LLE       Past Surgical History:   Procedure Laterality Date     APPENDECTOMY       COLPORRHAPHY ANTERIOR, POSTERIOR, COMBINED N/A 3/25/2019    Procedure: Anterior and posterior colporrhaphy, vulvar condyloma removal.;  Surgeon: Bj Mackenzie MD;  Location: [facility]     HYSTERECTOMY      abdominal- bleeding     OOPHORECTOMY      unilateral     OTHER SURGICAL HISTORY      LLE Varicose vein RF ablation at [facility]        @[facility]@    No current outpatient medications on file.       No Known Allergies    Pt reports that she quit smoking about 14 years ago. Her smoking use included cigarettes. She smoked 0.50 packs per day. She has never used smokeless tobacco. She reports current alcohol use. She reports that she does not use drugs.    Exam:  BP (!) 151/83 (BP Location: Left arm)   Pulse 69   Temp 98.2  F (36.8  C) (Oral)   Ht 1.53 m (5' 0.25\")   Wt 64 kg (141 lb)   SpO2 100%   BMI 27.31 kg/m      Awake, Alert OX3  Lungs - CTA bilaterally  CV - RRR, no murmurs, distal pulses intact  Abd - soft, non-distended, non-tender, +BS  Extr - No cyanosis or edema    A/P 76 year old year old female in need of colonoscopy for screening. Risks, benefits, alternatives, and complications were discussed including the possibility of perforation and " the patient agreed to proceed    Tino Yap MD

## 2021-08-07 ENCOUNTER — HEALTH MAINTENANCE LETTER (OUTPATIENT)
Age: 76
End: 2021-08-07

## 2021-10-02 ENCOUNTER — HEALTH MAINTENANCE LETTER (OUTPATIENT)
Age: 76
End: 2021-10-02

## 2022-01-25 ENCOUNTER — OFFICE VISIT (OUTPATIENT)
Dept: FAMILY MEDICINE | Facility: CLINIC | Age: 77
End: 2022-01-25
Payer: OTHER MISCELLANEOUS

## 2022-01-25 VITALS
HEIGHT: 63 IN | WEIGHT: 149.5 LBS | OXYGEN SATURATION: 99 % | SYSTOLIC BLOOD PRESSURE: 124 MMHG | RESPIRATION RATE: 16 BRPM | DIASTOLIC BLOOD PRESSURE: 72 MMHG | BODY MASS INDEX: 26.49 KG/M2 | TEMPERATURE: 98.1 F | HEART RATE: 75 BPM

## 2022-01-25 DIAGNOSIS — Z11.59 ENCOUNTER FOR SCREENING FOR OTHER VIRAL DISEASES: ICD-10-CM

## 2022-01-25 DIAGNOSIS — Z01.818 PREOP GENERAL PHYSICAL EXAM: Primary | ICD-10-CM

## 2022-01-25 DIAGNOSIS — I10 ESSENTIAL HYPERTENSION WITH GOAL BLOOD PRESSURE LESS THAN 140/90: ICD-10-CM

## 2022-01-25 DIAGNOSIS — I25.10 ATHEROSCLEROSIS OF CORONARY ARTERY OF NATIVE HEART WITHOUT ANGINA PECTORIS, UNSPECIFIED VESSEL OR LESION TYPE: ICD-10-CM

## 2022-01-25 DIAGNOSIS — E78.5 HYPERLIPIDEMIA LDL GOAL <130: Primary | ICD-10-CM

## 2022-01-25 DIAGNOSIS — S52.501A DISPLACED FRACTURE OF DISTAL END OF RIGHT RADIUS: ICD-10-CM

## 2022-01-25 DIAGNOSIS — E78.5 HYPERLIPIDEMIA LDL GOAL <130: ICD-10-CM

## 2022-01-25 LAB
ANION GAP SERPL CALCULATED.3IONS-SCNC: 3 MMOL/L (ref 3–14)
BUN SERPL-MCNC: 19 MG/DL (ref 7–30)
CALCIUM SERPL-MCNC: 9.5 MG/DL (ref 8.5–10.1)
CHLORIDE BLD-SCNC: 107 MMOL/L (ref 94–109)
CO2 SERPL-SCNC: 29 MMOL/L (ref 20–32)
CREAT SERPL-MCNC: 0.8 MG/DL (ref 0.52–1.04)
ERYTHROCYTE [DISTWIDTH] IN BLOOD BY AUTOMATED COUNT: 14.8 % (ref 10–15)
FERRITIN SERPL-MCNC: 18 NG/ML (ref 8–252)
GFR SERPL CREATININE-BSD FRML MDRD: 76 ML/MIN/1.73M2
GLUCOSE BLD-MCNC: 109 MG/DL (ref 70–99)
HCT VFR BLD AUTO: 35.3 % (ref 35–47)
HGB BLD-MCNC: 10.7 G/DL (ref 11.7–15.7)
HOLD SPECIMEN: NORMAL
IRON SATN MFR SERPL: 8 % (ref 15–46)
IRON SERPL-MCNC: 32 UG/DL (ref 35–180)
MCH RBC QN AUTO: 26.2 PG (ref 26.5–33)
MCHC RBC AUTO-ENTMCNC: 30.3 G/DL (ref 31.5–36.5)
MCV RBC AUTO: 86 FL (ref 78–100)
PLATELET # BLD AUTO: 256 10E3/UL (ref 150–450)
POTASSIUM BLD-SCNC: 3.6 MMOL/L (ref 3.4–5.3)
RBC # BLD AUTO: 4.09 10E6/UL (ref 3.8–5.2)
SODIUM SERPL-SCNC: 139 MMOL/L (ref 133–144)
TIBC SERPL-MCNC: 382 UG/DL (ref 240–430)
WBC # BLD AUTO: 9.3 10E3/UL (ref 4–11)

## 2022-01-25 PROCEDURE — 80048 BASIC METABOLIC PNL TOTAL CA: CPT | Performed by: NURSE PRACTITIONER

## 2022-01-25 PROCEDURE — 83550 IRON BINDING TEST: CPT | Performed by: NURSE PRACTITIONER

## 2022-01-25 PROCEDURE — 99214 OFFICE O/P EST MOD 30 MIN: CPT | Performed by: NURSE PRACTITIONER

## 2022-01-25 PROCEDURE — 93000 ELECTROCARDIOGRAM COMPLETE: CPT | Performed by: NURSE PRACTITIONER

## 2022-01-25 PROCEDURE — 85027 COMPLETE CBC AUTOMATED: CPT | Performed by: NURSE PRACTITIONER

## 2022-01-25 PROCEDURE — 82728 ASSAY OF FERRITIN: CPT | Performed by: NURSE PRACTITIONER

## 2022-01-25 PROCEDURE — 36415 COLL VENOUS BLD VENIPUNCTURE: CPT | Performed by: NURSE PRACTITIONER

## 2022-01-25 RX ORDER — HYDROCODONE BITARTRATE AND ACETAMINOPHEN 5; 325 MG/1; MG/1
TABLET ORAL
COMMUNITY
Start: 2022-01-24 | End: 2022-11-21

## 2022-01-25 ASSESSMENT — MIFFLIN-ST. JEOR: SCORE: 1145.26

## 2022-01-25 NOTE — PATIENT INSTRUCTIONS
Preparing for Your Surgery  Getting started  A nurse will call you to review your health history and instructions. They will give you an arrival time based on your scheduled surgery time. Please be ready to share:    Your doctor's clinic name and phone number    Your medical, surgical and anesthesia history    A list of allergies and sensitivities    A list of medicines, including herbal treatments and over-the-counter drugs    Whether the patient has a legal guardian (ask how to send us the papers in advance)  Please tell us if you're pregnant--or if there's any chance you might be pregnant. Some surgeries may injure a fetus (unborn baby), so they require a pregnancy test. Surgeries that are safe for a fetus don't always need a test, and you can choose whether to have one.   If you have a child who's having surgery, please ask for a copy of Preparing for Your Child's Surgery.    Preparing for surgery    Within 30 days of surgery: Have a pre-op exam (sometimes called an H&P, or History and Physical). This can be done at a clinic or pre-operative center.  ? If you're having a , you may not need this exam. Talk to your care team.    At your pre-op exam, talk to your care team about all medicines you take. If you need to stop any medicines before surgery, ask when to start taking them again.  ? We do this for your safety. Many medicines can make you bleed too much during surgery. Some change how well surgery (anesthesia) drugs work.    Call your insurance company to let them know you're having surgery. (If you don't have insurance, call [phone].)    Call your clinic if there's any change in your health. This includes signs of a cold or flu (sore throat, runny nose, cough, rash, fever). It also includes a scrape or scratch near the surgery site.    If you have questions on the day of surgery, call your hospital or surgery center.  COVID testing  You may need to be tested for COVID-19 before having  surgery. If so, your surgical team will give you instructions for scheduling this test, separate from your preoperative history and physical.  Eating and drinking guidelines  For your safety: Unless your surgeon tells you otherwise, follow the guidelines below.    Eat and drink as usual until 8 hours before surgery. After that, no food or milk.    Drink clear liquids until 2 hours before surgery. These are liquids you can see through, like water, Gatorade and Propel Water. You may also have black coffee and tea (no cream or milk).    Nothing by mouth within 2 hours of surgery. This includes gum, candy and breath mints.    If you drink alcohol: Stop drinking it the night before surgery.    If your care team tells you to take medicine on the morning of surgery, it's okay to take it with a sip of water.  Preventing infection    Shower or bathe the night before and morning of your surgery. Follow the instructions your clinic gave you. (If no instructions, use regular soap.)    Don't shave or clip hair near your surgery site. We'll remove the hair if needed.    Don't smoke or vape the morning of surgery. You may chew nicotine gum up to 2 hours before surgery. A nicotine patch is okay.  ? Note: Some surgeries require you to completely quit smoking and nicotine. Check with your surgeon.    Your care team will make every effort to keep you safe from infection. We will:  ? Clean our hands often with soap and water (or an alcohol-based hand rub).  ? Clean the skin at your surgery site with a special soap that kills germs.  ? Give you a special gown to keep you warm. (Cold raises the risk of infection.)  ? Wear special hair covers, masks, gowns and gloves during surgery.  ? Give antibiotic medicine, if prescribed. Not all surgeries need antibiotics.  What to bring on the day of surgery    Photo ID and insurance card    Copy of your health care directive, if you have one    Glasses and hearing aides (bring cases)  ? You can't  wear contacts during surgery    Inhaler and eye drops, if you use them (tell us about these when you arrive)    CPAP machine or breathing device, if you use them    A few personal items, if spending the night    If you have . . .  ? A pacemaker, ICD (cardiac defibrillator) or other implant: Bring the ID card.  ? An implanted stimulator: Bring the remote control.  ? A legal guardian: Bring a copy of the certified (court-stamped) guardianship papers.  Please remove any jewelry, including body piercings. Leave jewelry and other valuables at home.  If you're going home the day of surgery    You must have a responsible adult drive you home. They should stay with you overnight as well.    If you don't have someone to stay with you, and you aren't safe to go home alone, we may keep you overnight. Insurance often won't pay for this.  After surgery  If it's hard to control your pain or you need more pain medicine, please call your surgeon's office.  Questions?   If you have any questions for your care team, list them here: _________________________________________________________________________________________________________________________________________________________________________ ____________________________________ ____________________________________ ____________________________________  For informational purposes only. Not to replace the advice of your health care provider. Copyright   2003, 2019 [facility]. All rights reserved. Clinically reviewed by Ana Hightower MD. [employer] [id number] - REV 07/21.

## 2022-01-25 NOTE — PROGRESS NOTES
[facility]  [address] [address]  Phone: [phone]  Primary Provider: Evelyn Yanes  Pre-op Performing Provider: ZEUS VICTORIA    PREOPERATIVE EVALUATION:  Today's date: 1/25/2022    Abi Lees is a 76 year old female who presents for a preoperative evaluation.    Surgical Information:  Surgery/Procedure: Open reduction internal fixation radius distal  Surgery Location: [facility]  Surgeon: Dr. Ronnie Grant  Surgery Date: 1/26/2022  Time of Surgery: 9:00 am.  Where patient plans to recover: At home with family  Fax number for surgical facility: [phone]    Type of Anesthesia Anticipated: Axillary Block    Assessment & Plan     The proposed surgical procedure is considered INTERMEDIATE risk.    Preop general physical exam  Patient has hx of stent placement with last EKG in 2004.  EKG ordered and NSR today.  BMP and CBC ordered also due to cardiac medications and dieretic use and EBL potential with open reduction and internal fixation.  - EKG 12-lead complete w/read - Clinics  - Basic metabolic panel  (Ca, Cl, CO2, Creat, Gluc, K, Na, BUN); Future  - CBC with Platelets and Reflex to Iron Studies; Future    Displaced fracture of distal end of right radius  Stable on tylenol currently.    Hyperlipidemia LDL goal <130  Stable on simvastatin.    Essential hypertension with goal blood pressure less than 140/90  Stable on medications.    Atherosclerosis of coronary artery of native heart without angina pectoris, unspecified vessel or lesion type  Stable with no current or recent chest pain or shortness of breath.    Risks and Recommendations:  The patient has the following additional risks and recommendations for perioperative complications:   - No identified additional risk factors other than previously addressed    Medication Instructions:   - ACE/ARB: May be continued on the day of surgery.    - Beta Blockers: Continue taking on the day  of surgery.   - Diuretics: HOLD on the day of surgery.   - Statins: Continue taking on the day of surgery.     RECOMMENDATION:  APPROVAL GIVEN to proceed with proposed procedure pending review of diagnostic evaluation.    Subjective     HPI related to upcoming procedure: she was at work on a two step ladder and missed a step and landed on cement floor and broke the wrist.    Preop Questions 1/25/2022   1. Have you ever had a heart attack or stroke? chest pain and stent placement   2. Have you ever had surgery on your heart or blood vessels, such as a stent placement, a coronary artery bypass, or surgery on an artery in your head, neck, heart, or legs? No   3. Do you have chest pain with activity? No   4. Do you have a history of  heart failure? No   5. Do you currently have a cold, bronchitis or symptoms of other infection? No   6. Do you have a cough, shortness of breath, or wheezing? No   7. Do you or anyone in your family have previous history of blood clots? No   8. Do you or does anyone in your family have a serious bleeding problem such as prolonged bleeding following surgeries or cuts? No   9. Have you ever had problems with anemia or been told to take iron pills? No   10. Have you had any abnormal blood loss such as black, tarry or bloody stools, or abnormal vaginal bleeding? No   11. Have you ever had a blood transfusion? No   12. Are you willing to have a blood transfusion if it is medically needed before, during, or after your surgery? Yes   13. Have you or any of your relatives ever had problems with anesthesia? No   14. Do you have sleep apnea, excessive snoring or daytime drowsiness? No   15. Do you have any artifical heart valves or other implanted medical devices like a pacemaker, defibrillator, or continuous glucose monitor? No   16. Do you have artificial joints? No   17. Are you allergic to latex? No       Health Care Directive:  Patient does not have a Health Care Directive or Living Will:  Discussed advance care planning with patient; information given to patient to review.    Preoperative Review of :   reviewed - controlled substances reflected in medication list.  Picked up Norco prior to surgery for pain.  Currently only taking tylenol for pain    Status of Chronic Conditions:  HYPERLIPIDEMIA - Patient has a long history of significant Hyperlipidemia requiring medication for treatment with recent good control. Patient reports no problems or side effects with the medication.     HYPERTENSION - Patient has longstanding history of HTN , currently denies any symptoms referable to elevated blood pressure. Specifically denies chest pain, palpitations, dyspnea, orthopnea, PND or peripheral edema. Blood pressure readings have been in normal range. Current medication regimen is as listed below. Patient denies any side effects of medication.       Review of Systems  CONSTITUTIONAL: NEGATIVE for fever, chills, change in weight  INTEGUMENTARY/SKIN: NEGATIVE for worrisome rashes, moles or lesions  EYES: NEGATIVE for vision changes or irritation  ENT/MOUTH: NEGATIVE for ear, mouth and throat problems  RESP: NEGATIVE for significant cough or SOB  BREAST: NEGATIVE for masses, tenderness or discharge  CV: NEGATIVE for chest pain, palpitations or peripheral edema  GI: NEGATIVE for nausea, abdominal pain, heartburn, or change in bowel habits  : NEGATIVE for frequency, dysuria, or hematuria  MUSCULOSKELETAL:POSITIVE  for some back issues that she see's chiropractic for; right wrist fracture  NEURO: POSITIVE for hx of neuropathy in feet and hands  ENDOCRINE: NEGATIVE for temperature intolerance, skin/hair changes  HEME: NEGATIVE for bleeding problems  PSYCHIATRIC: NEGATIVE for changes in mood or affect    Patient Active Problem List    Diagnosis Date Noted     Displaced fracture of distal end of right radius 01/20/2022     Priority: Medium     Formatting of this note might be different from the original.  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 05/18/2016     Priority: Medium     Advanced directives, counseling/discussion 01/25/2016     Priority: Medium     Advance Care Planning 1/25/2016: Info packet given 1/25/16             Neuropathy 07/31/2015     Priority: Medium     Bilateral feet       Hyperlipidemia LDL goal <130 07/31/2015     Priority: Medium     Colonoscopy refused 07/31/2015     Priority: Medium     Care refused by patient 07/31/2015     Priority: Medium     Mammogram, colonoscopy, DEXA       Varicose veins 11/29/2013     Priority: Medium     Phlebitis of superficial vein of lower extremity 11/29/2013     Priority: Medium     Osteoarthritis 10/07/2012     Priority: Medium     Bilateral foot pain 10/03/2012     Priority: Medium     Vitamin D deficiency 03/24/2010     Priority: Medium     Coronary atherosclerosis 05/14/2007     Priority: Medium     Formatting of this note might be different from the original.  Stent placed 7/06        Past Medical History:   Diagnosis Date     CAD in native artery     s/p stent placement in 2007 [facility]     HTN (hypertension), benign      Hyperlipidemia      Peripheral neuropathy      Varicose veins     bilateral with thrombophlebitis LLE     Past Surgical History:   Procedure Laterality Date     APPENDECTOMY       COLPORRHAPHY ANTERIOR, POSTERIOR, COMBINED N/A 3/25/2019    Procedure: Anterior and posterior colporrhaphy, vulvar condyloma removal.;  Surgeon: Bj Mackenzie MD;  Location: [facility] OR     HYSTERECTOMY      abdominal- bleeding     OOPHORECTOMY      unilateral     OTHER SURGICAL HISTORY      LLE Varicose vein RF ablation at [facility]      Current Outpatient Medications   Medication Sig Dispense Refill     aspirin 81 MG tablet Take  by mouth  "daily.       atenolol (TENORMIN) 25 MG tablet Take 1 tablet (25 mg) by mouth daily 90 tablet 3     atorvastatin (LIPITOR) 40 MG tablet Take 1 tablet (40 mg) by mouth daily 90 tablet 3     Cholecalciferol (VITAMIN D-3 PO)        hydrochlorothiazide (HYDRODIURIL) 25 MG tablet Take 1 tablet (25 mg) by mouth daily 90 tablet 3     HYDROcodone-acetaminophen (NORCO) 5-325 MG tablet        lisinopril (ZESTRIL) 40 MG tablet Take 1 tablet (40 mg) by mouth daily 90 tablet 3     gabapentin (NEURONTIN) 300 MG capsule Take 1 capsule (300 mg) by mouth 4 times daily 360 capsule 1       No Known Allergies     Social History     Tobacco Use     Smoking status: Former Smoker     Packs/day: 0.50     Types: Cigarettes     Quit date: 2007     Years since quittin.5     Smokeless tobacco: Never Used   Substance Use Topics     Alcohol use: Yes     Alcohol/week: 0.0 standard drinks     Comment: rare     Family History   Problem Relation Age of Onset     Tremor Mother      Multiple Sclerosis Father      History   Drug Use No         Objective     /72   Pulse 75   Temp 98.1  F (36.7  C) (Tympanic)   Resp 16   Ht 1.605 m (5' 3.19\")   Wt 67.8 kg (149 lb 8 oz)   SpO2 99%   BMI 26.32 kg/m      Physical Exam    GENERAL APPEARANCE: healthy, alert and no distress     EYES: EOMI, PERRL     HENT: ear canals and TM's normal and nose and mouth without ulcers or lesions     NECK: no adenopathy, no asymmetry, masses, or scars and thyroid normal to palpation     RESP: lungs clear to auscultation - no rales, rhonchi or wheezes     CV: regular rates and rhythm, normal S1 S2, no S3 or S4 and no murmur, click or rub     ABDOMEN:  soft, nontender, no HSM or masses and bowel sounds normal     MS: extremities normal- no gross deformities noted, no evidence of inflammation in joints, FROM in all extremities.  Exam limited on right arm due to splint and sling     SKIN: no suspicious lesions or rashes     NEURO: Normal strength and tone, sensory " exam grossly normal, mentation intact and speech normal     PSYCH: mentation appears normal. and affect normal/bright     LYMPHATICS: No cervical adenopathy    Recent Labs   Lab Test 07/16/21  0949   HGB 10.9*         POTASSIUM 3.7   CR 0.82        Component      Latest Ref Rng & Units 1/25/2022   Sodium      133 - 144 mmol/L 139   Potassium      3.4 - 5.3 mmol/L 3.6   Chloride      94 - 109 mmol/L 107   Carbon Dioxide      20 - 32 mmol/L 29   Anion Gap      3 - 14 mmol/L 3   Urea Nitrogen      7 - 30 mg/dL 19   Creatinine      0.52 - 1.04 mg/dL 0.80   Calcium      8.5 - 10.1 mg/dL 9.5   Glucose      70 - 99 mg/dL 109 (H)   GFR Estimate      >60 mL/min/1.73m2 76   WBC      4.0 - 11.0 10e3/uL 9.3   RBC Count      3.80 - 5.20 10e6/uL 4.09   Hemoglobin      11.7 - 15.7 g/dL 10.7 (L)   Hematocrit      35.0 - 47.0 % 35.3   MCV      78 - 100 fL 86   MCH      26.5 - 33.0 pg 26.2 (L)   MCHC      31.5 - 36.5 g/dL 30.3 (L)   RDW      10.0 - 15.0 % 14.8   Platelet Count      150 - 450 10e3/uL 256   Iron      35 - 180 ug/dL 32 (L)   Iron Binding Cap      240 - 430 ug/dL 382   Iron Saturation Index      15 - 46 % 8 (L)   SARS CoV2 PCR      Negative    Ferritin      8 - 252 ng/mL 18       Diagnostics:  Labs pending at this time.  Results will be reviewed when available.   EKG: appears normal, NSR, normal axis, normal intervals, no acute ST/T changes c/w ischemia, no LVH by voltage criteria, unchanged from previous tracings    Revised Cardiac Risk Index (RCRI):  The patient has the following serious cardiovascular risks for perioperative complications:   - No serious cardiac risks = 0 points     RCRI Interpretation: 0 points: Class I (very low risk - 0.4% complication rate)    Signed Electronically by: Randa Osborn NP  Copy of this evaluation report is provided to requesting physician.

## 2022-01-27 DIAGNOSIS — D50.9 IRON DEFICIENCY ANEMIA, UNSPECIFIED IRON DEFICIENCY ANEMIA TYPE: Primary | ICD-10-CM

## 2022-01-27 RX ORDER — FERROUS SULFATE 325(65) MG
325 TABLET ORAL
Qty: 30 TABLET | Refills: 0 | Status: SHIPPED | OUTPATIENT
Start: 2022-01-27 | End: 2022-02-18

## 2022-02-07 DIAGNOSIS — G62.9 NEUROPATHY: ICD-10-CM

## 2022-02-09 RX ORDER — GABAPENTIN 300 MG/1
CAPSULE ORAL
Qty: 360 CAPSULE | Refills: 1 | Status: SHIPPED | OUTPATIENT
Start: 2022-02-09 | End: 2022-08-08

## 2022-02-18 DIAGNOSIS — D50.9 IRON DEFICIENCY ANEMIA, UNSPECIFIED IRON DEFICIENCY ANEMIA TYPE: ICD-10-CM

## 2022-02-18 RX ORDER — FERROUS SULFATE 325(65) MG
325 TABLET ORAL
Qty: 30 TABLET | Refills: 0 | Status: SHIPPED | OUTPATIENT
Start: 2022-02-18 | End: 2022-11-21

## 2022-02-18 NOTE — TELEPHONE ENCOUNTER
Spoke with patient  She is not going to take the Ferrous Sulfate any more  Michelle Osman on 2/18/2022 at 3:12 PM

## 2022-02-18 NOTE — TELEPHONE ENCOUNTER
Pending Prescriptions:                       Disp   Refills    ferrous sulfate (FEROSUL) 325 (65 Fe) MG t*30 tab*0        Sig: Take 1 tablet (325 mg) by mouth daily (with           breakfast)    Routing refill request to provider for review/approval because:  Last visit & labs 1/25/22.    Yu Hartman RN

## 2022-02-18 NOTE — TELEPHONE ENCOUNTER
I will refill for one month.  Please contact patient and let her know that she needs to follow-up with repeat labs and check in with her primary care to work up anemia if persistent and not related to injury.  Randa Osborn NP on 2/18/2022 at 2:21 PM

## 2022-07-06 ENCOUNTER — NURSE TRIAGE (OUTPATIENT)
Dept: NURSING | Facility: CLINIC | Age: 77
End: 2022-07-06

## 2022-07-06 NOTE — TELEPHONE ENCOUNTER
Fell about a month ago was not to bad but now her right knee is  painfull she rates it between a 5-8/10 depending on how active she is and the pain will shot up into her thigh.    Per protocol see in office in the next 2-3 days.  Care advice given.  Verbalizes understanding and agrees with plan.  She is going to call the orthopedic  And rehab clinic to see if she can get a appointment.    Yani Be RN   [facility] Nurse Advisor  12:54 PM 7/6/2022    COVID 19 Nurse Triage Plan/Patient Instructions    Please be aware that novel coronavirus (COVID-19) may be circulating in the community. If you develop symptoms such as fever, cough, or SOB or if you have concerns about the presence of another infection including coronavirus (COVID-19), please contact your health care provider or visit [email or web address].     Disposition/Instructions    In-Person Visit with provider recommended. Reference Visit Selection Guide.    Thank you for taking steps to prevent the spread of this virus.  o Limit your contact with others.  o Wear a simple mask to cover your cough.  o Wash your hands well and often.    Resources    [facility]: About COVID-19: [email or web address]    CDC: What to Do If You're Sick: www.cdc.gov/coronavirus/2019-ncov/about/steps-when-sick.html    CDC: Ending Home Isolation: www.cdc.gov/coronavirus/2019-ncov/hcp/disposition-in-home-patients.html     CDC: Caring for Someone: www.cdc.gov/coronavirus/2019-ncov/if-you-are-sick/care-for-someone.html     [facility]: Interim Guidance for Hospital Discharge to Home: www.health.[facility].[place]/diseases/coronavirus/hcp/hospdischarge.pdf    [facility] clinical trials (COVID-19 research studies): [email or web address]     Below are the COVID-19 hotlines at the [place] Department of Health ([facility]). Interpreters are available.   o For health questions: Call 016-239-0866 or 1-639.181.2968 (7 a.m. to 7 p.m.)  o For  questions about schools and childcare: Call 729-556-0681 or 1-481.525.3293 (7 a.m. to 7 p.m.)     Reason for Disposition    Followed a knee injury    Injury is still painful or swollen after 2 weeks    Additional Information    Negative: Major bleeding (actively dripping or spurting) that can't be stopped    Negative: Bullet, stabbed by knife or other serious penetrating wound    Negative: Looks like a dislocated joint (crooked or deformed)    Negative: Serious injury with multiple fractures (broken bones)    Negative: Sounds like a life-threatening emergency to the triager    Negative: Wound looks infected    Negative: Can't stand (bear weight) or walk    Negative: Skin is split open or gaping (length > 1/2 inch or 12 mm)    Negative: Bleeding won't stop after 10 minutes of direct pressure (using correct technique)    Negative: Dirt in the wound and not removed after 15 minutes of scrubbing    Negative: Sounds like a serious injury to the triager    Negative: Injury and pain has not improved after 3 days    Negative: Injury interferes with work or school    Negative: A 'snap' or 'pop' was heard at the time of injury    Negative: Large swelling or bruise and size > palm of person's hand    Negative: High-risk adult (e.g., age > 60, osteoporosis, chronic steroid use)    Negative: Suspicious history for the injury    Negative: Last tetanus shot > 5 years ago and DIRTY cut or scrape    Negative: Last tetanus shot >10 years ago and CLEAN cut or scrape    Negative: SEVERE pain (e.g., excruciating)    Negative: No prior tetanus shots (or is not fully vaccinated) and any wound (e.g., cut or scrape)    Negative: HIV positive or severe immunodeficiency (severely weak immune system) and DIRTY cut or scrape    Negative: Patient wants to be seen    Protocols used: KNEE PAIN-A-OH, KNEE INJURY-A-OH

## 2022-07-15 NOTE — PROGRESS NOTES
ASSESSMENT & PLAN    Abi was seen today for pain and pain.    Diagnoses and all orders for this visit:    Bilateral knee pain  -     XR Knee Bilateral 3 Views; Future    Primary osteoarthritis of both knees      She finds condition manageable currently, does not desire intervention for now. Ok for monitoring.  Questions answered. Discussed signs and symptoms that may indicate more serious issues; the patient was instructed to seek appropriate care if noted. Abi indicates understanding of these issues and agrees with the plan.        See Patient Instructions  Patient Instructions   Discussed nature of degenerative arthrosis of the knee.  Symptom treatment generally includes over-the-counter medications, ice or heat, topical treatments, and rest if needed.  May use compression or bracing for comfort.  Discussed potential benefits of rehabilitation, to maintain or improve function at the knee. Contact clinic if interested in physical therapy.  There are benefits of exercise and remaining active. It is good to hear you've been able to remain active.  Discussed injection therapy, steroid vs viscosupplement. Hold with injection for now.  Also briefly discussed future consideration of referral to orthopedic surgery for further evaluation and discussion of additional treatment options.    For now, plan primarily continued monitoring. It is ok to remain active as able.  Contact clinic if any questions/concerns, or if desiring to change course with treatment. Otherwise, follow up is open ended.    If you have any further questions for your physician or physician s care team you can call [phone] and use option 3 to leave a voice message. Calls received during business hours will be returned same day.          Jasmeet Shah [facility]    -----  Chief Complaint   Patient presents with     Right Knee - Pain     Left Knee - Pain       SUBJECTIVE  Abi Lees is a/an 76  year old female who is seen as a self referral for evaluation of bilateral knee pain.     The patient is seen with their .    Onset: 1 month(s) ago. Patient describes injury as tripping over a hose, fell forward, fell directly on both knees  Location of Pain: bilateral knee pain; right worse than left; anterior knee   Worsened by: walking, stairs,   Better with: voltaren, knee wrap   Treatments tried: rest/activity avoidance, elevation, ice and casting/splinting/bracing  Associated symptoms: swelling, warmth, weakness of right knee primarily and feeling of instability    Orthopedic/Surgical history: YES - Date: right knee; previously had menisectomy on right knee.   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 This morning had some radiating pain to right lower elg.  Pain can be different at times. Sometimes diffuse knee, otherwise can be more focal (e.g., medial knee).  voltaren gel has been helpful, as has compression.    Intermittent swelling. More activity leads to more swelling.  + knee popping sensation. Not really painful noise.  Limps at times.        REVIEW OF SYSTEMS:  Review of Systems      OBJECTIVE:  /70   Pulse 65   Wt 67.6 kg (149 lb)   BMI 26.24 kg/m     General: healthy, alert and in no distress  HEENT: no scleral icterus or conjunctival erythema  Skin: no suspicious lesions or rash. No jaundice.  CV: distal perfusion intact   Resp: normal respiratory effort without conversational dyspnea   Psych: normal mood and affect  Gait: ambulates independently  Neuro: Normal light sensory exam of lower extremity       Bilateral Knee exam    Inspection:   Mild right effusion   no ecchymosis    ROM:      Some stiffness end range flexion    Patellar Motion:      Crepitus noted in the patellofemoral joint    Tender: left anterior knee, anterior patella inferiorly; on right along joint lines    Special Tests:      neg (-) varus        neg (-)  valgus       RADIOLOGY:  I independently ordered, visualized and reviewed these images with the patient  Bilateral knee arthrosis, primarily medial compartment narrowing, moderate to severe. No clear acute bony abnormality noted.      Recent Results (from the past 24 hour(s))   XR Knee Bilateral 3 Views    Narrative    EXAM: XR KNEE BILATERAL 3 VIEWS  LOCATION: [facility]  DATE/TIME: 7/16/2022 10:54 AM    INDICATION: Bilateral knee pain.  COMPARISON: None.      Impression    IMPRESSION:   RIGHT KNEE: Moderate-severe tricompartmental degenerative arthritic changes, greatest in the medial compartment where there is near complete joint space loss and osteophytes. The tibial plafond appears translated anterior relative to the femoral   condyles, raising suspicion for underlying ACL disruption/insufficiency. No significant joint effusion. Few small ossified joint bodies in the posterior knee. No fracture.    LEFT KNEE: Moderate-severe tricompartmental degenerative arthritic changes, greatest in the medial compartment where there is near complete joint space loss and osteophytes. No significant joint effusion. Small ossified joint body along the posterior   knee. No fracture.

## 2022-07-16 ENCOUNTER — OFFICE VISIT (OUTPATIENT)
Dept: ORTHOPEDICS | Facility: CLINIC | Age: 77
End: 2022-07-16
Payer: COMMERCIAL

## 2022-07-16 VITALS
WEIGHT: 149 LBS | BODY MASS INDEX: 26.24 KG/M2 | DIASTOLIC BLOOD PRESSURE: 70 MMHG | SYSTOLIC BLOOD PRESSURE: 130 MMHG | HEART RATE: 65 BPM

## 2022-07-16 DIAGNOSIS — M25.561 ACUTE PAIN OF BOTH KNEES: Primary | ICD-10-CM

## 2022-07-16 DIAGNOSIS — M25.562 ACUTE PAIN OF BOTH KNEES: Primary | ICD-10-CM

## 2022-07-16 DIAGNOSIS — M17.0 PRIMARY OSTEOARTHRITIS OF BOTH KNEES: ICD-10-CM

## 2022-07-16 PROCEDURE — 99203 OFFICE O/P NEW LOW 30 MIN: CPT | Performed by: PEDIATRICS

## 2022-07-16 NOTE — PATIENT INSTRUCTIONS
Discussed nature of degenerative arthrosis of the knee.  Symptom treatment generally includes over-the-counter medications, ice or heat, topical treatments, and rest if needed.  May use compression or bracing for comfort.  Discussed potential benefits of rehabilitation, to maintain or improve function at the knee. Contact clinic if interested in physical therapy.  There are benefits of exercise and remaining active. It is good to hear you've been able to remain active.  Discussed injection therapy, steroid vs viscosupplement. Hold with injection for now.  Also briefly discussed future consideration of referral to orthopedic surgery for further evaluation and discussion of additional treatment options.    For now, plan primarily continued monitoring. It is ok to remain active as able.  Contact clinic if any questions/concerns, or if desiring to change course with treatment. Otherwise, follow up is open ended.    If you have any further questions for your physician or physician s care team you can call [phone] and use option 3 to leave a voice message. Calls received during business hours will be returned same day.

## 2022-07-16 NOTE — LETTER
7/16/2022         RE: Abi Lees  [address]   [place] [address]        Dear Colleague,    Thank you for referring your patient, Abi Lees, to the [facility]. Please see a copy of my visit note below.    ASSESSMENT & PLAN    Abi was seen today for pain and pain.    Diagnoses and all orders for this visit:    Bilateral knee pain  -     XR Knee Bilateral 3 Views; Future    Primary osteoarthritis of both knees      She finds condition manageable currently, does not desire intervention for now. Ok for monitoring.  Questions answered. Discussed signs and symptoms that may indicate more serious issues; the patient was instructed to seek appropriate care if noted. Abi indicates understanding of these issues and agrees with the plan.        See Patient Instructions  Patient Instructions   Discussed nature of degenerative arthrosis of the knee.  Symptom treatment generally includes over-the-counter medications, ice or heat, topical treatments, and rest if needed.  May use compression or bracing for comfort.  Discussed potential benefits of rehabilitation, to maintain or improve function at the knee. Contact clinic if interested in physical therapy.  There are benefits of exercise and remaining active. It is good to hear you've been able to remain active.  Discussed injection therapy, steroid vs viscosupplement. Hold with injection for now.  Also briefly discussed future consideration of referral to orthopedic surgery for further evaluation and discussion of additional treatment options.    For now, plan primarily continued monitoring. It is ok to remain active as able.  Contact clinic if any questions/concerns, or if desiring to change course with treatment. Otherwise, follow up is open ended.    If you have any further questions for your physician or physician s care team you can call [phone] and use option 3 to leave a voice message. Calls received during business  hours will be returned same day.          DO FREDY Melara [facility]    -----  Chief Complaint   Patient presents with     Right Knee - Pain     Left Knee - Pain       SUBJECTIVE  Abi Lees is a/an 76 year old female who is seen as a self referral for evaluation of bilateral knee pain.     The patient is seen with their .    Onset: 1 month(s) ago. Patient describes injury as tripping over a hose, fell forward, fell directly on both knees  Location of Pain: bilateral knee pain; right worse than left; anterior knee   Worsened by: walking, stairs,   Better with: voltaren, knee wrap   Treatments tried: rest/activity avoidance, elevation, ice and casting/splinting/bracing  Associated symptoms: swelling, warmth, weakness of right knee primarily and feeling of instability    Orthopedic/Surgical history: YES - Date: right knee; previously had menisectomy on right knee.   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 This morning had some radiating pain to right lower elg.  Pain can be different at times. Sometimes diffuse knee, otherwise can be more focal (e.g., medial knee).  voltaren gel has been helpful, as has compression.    Intermittent swelling. More activity leads to more swelling.  + knee popping sensation. Not really painful noise.  Limps at times.        REVIEW OF SYSTEMS:  Review of Systems      OBJECTIVE:  /70   Pulse 65   Wt 67.6 kg (149 lb)   BMI 26.24 kg/m     General: healthy, alert and in no distress  HEENT: no scleral icterus or conjunctival erythema  Skin: no suspicious lesions or rash. No jaundice.  CV: distal perfusion intact   Resp: normal respiratory effort without conversational dyspnea   Psych: normal mood and affect  Gait: ambulates independently  Neuro: Normal light sensory exam of lower extremity       Bilateral Knee exam    Inspection:   Mild right effusion   no  ecchymosis    ROM:      Some stiffness end range flexion    Patellar Motion:      Crepitus noted in the patellofemoral joint    Tender: left anterior knee, anterior patella inferiorly; on right along joint lines    Special Tests:      neg (-) varus        neg (-) valgus       RADIOLOGY:  I independently ordered, visualized and reviewed these images with the patient  Bilateral knee arthrosis, primarily medial compartment narrowing, moderate to severe. No clear acute bony abnormality noted.      Recent Results (from the past 24 hour(s))   XR Knee Bilateral 3 Views    Narrative    EXAM: XR KNEE BILATERAL 3 VIEWS  LOCATION: [facility]  DATE/TIME: 7/16/2022 10:54 AM    INDICATION: Bilateral knee pain.  COMPARISON: None.      Impression    IMPRESSION:   RIGHT KNEE: Moderate-severe tricompartmental degenerative arthritic changes, greatest in the medial compartment where there is near complete joint space loss and osteophytes. The tibial plafond appears translated anterior relative to the femoral   condyles, raising suspicion for underlying ACL disruption/insufficiency. No significant joint effusion. Few small ossified joint bodies in the posterior knee. No fracture.    LEFT KNEE: Moderate-severe tricompartmental degenerative arthritic changes, greatest in the medial compartment where there is near complete joint space loss and osteophytes. No significant joint effusion. Small ossified joint body along the posterior   knee. No fracture.               Again, thank you for allowing me to participate in the care of your patient.        Sincerely,        Jasmeet Shah DO

## 2022-08-22 DIAGNOSIS — I10 ESSENTIAL HYPERTENSION WITH GOAL BLOOD PRESSURE LESS THAN 140/90: ICD-10-CM

## 2022-08-24 RX ORDER — HYDROCHLOROTHIAZIDE 25 MG/1
25 TABLET ORAL DAILY
Qty: 90 TABLET | Refills: 0 | Status: SHIPPED | OUTPATIENT
Start: 2022-08-24 | End: 2022-11-21

## 2022-08-24 NOTE — TELEPHONE ENCOUNTER
Prescription approved per [facility] Refill Protocol.    Mera Galvez RN BSN  [facility]

## 2022-09-03 ENCOUNTER — HEALTH MAINTENANCE LETTER (OUTPATIENT)
Age: 77
End: 2022-09-03

## 2022-11-08 NOTE — Clinical Note
[facility]  [address] [id number]  [phone]    January 4, 2016    Abi Lees  [address] [place] [address]          Dear Abi,    This letter is to remind you that you are due for your mammogram.    Please call [phone] to schedule your appointment at your earliest convenience.     If you have transferred your care elsewhere, please contact our office and we would be happy to forward your records. If you have any financial concerns regarding this appointment, please call so that we can discuss this further.      Sincerely,    Bj Mackenzie MD/tk  
Detail Level: Zone
Detail Level: Simple

## 2022-11-21 ENCOUNTER — OFFICE VISIT (OUTPATIENT)
Dept: FAMILY MEDICINE | Facility: CLINIC | Age: 77
End: 2022-11-21
Payer: COMMERCIAL

## 2022-11-21 VITALS
DIASTOLIC BLOOD PRESSURE: 74 MMHG | WEIGHT: 153 LBS | TEMPERATURE: 97.4 F | HEIGHT: 63 IN | HEART RATE: 80 BPM | BODY MASS INDEX: 27.11 KG/M2 | OXYGEN SATURATION: 97 % | SYSTOLIC BLOOD PRESSURE: 128 MMHG | RESPIRATION RATE: 15 BRPM

## 2022-11-21 DIAGNOSIS — I10 ESSENTIAL HYPERTENSION WITH GOAL BLOOD PRESSURE LESS THAN 140/90: ICD-10-CM

## 2022-11-21 DIAGNOSIS — E78.5 HYPERLIPIDEMIA LDL GOAL <130: ICD-10-CM

## 2022-11-21 DIAGNOSIS — G62.9 NEUROPATHY: ICD-10-CM

## 2022-11-21 PROCEDURE — 36415 COLL VENOUS BLD VENIPUNCTURE: CPT | Performed by: NURSE PRACTITIONER

## 2022-11-21 PROCEDURE — 80061 LIPID PANEL: CPT | Performed by: NURSE PRACTITIONER

## 2022-11-21 PROCEDURE — 99214 OFFICE O/P EST MOD 30 MIN: CPT | Performed by: NURSE PRACTITIONER

## 2022-11-21 PROCEDURE — 80048 BASIC METABOLIC PNL TOTAL CA: CPT | Performed by: NURSE PRACTITIONER

## 2022-11-21 PROCEDURE — 82043 UR ALBUMIN QUANTITATIVE: CPT | Performed by: NURSE PRACTITIONER

## 2022-11-21 RX ORDER — HYDROCHLOROTHIAZIDE 25 MG/1
25 TABLET ORAL DAILY
Qty: 90 TABLET | Refills: 3 | Status: SHIPPED | OUTPATIENT
Start: 2022-11-21 | End: 2023-11-24

## 2022-11-21 RX ORDER — ATORVASTATIN CALCIUM 40 MG/1
40 TABLET, FILM COATED ORAL DAILY
Qty: 90 TABLET | Refills: 3 | Status: SHIPPED | OUTPATIENT
Start: 2022-11-21 | End: 2023-11-24

## 2022-11-21 RX ORDER — GABAPENTIN 300 MG/1
300 CAPSULE ORAL 4 TIMES DAILY
Qty: 360 CAPSULE | Refills: 3 | Status: SHIPPED | OUTPATIENT
Start: 2022-11-21 | End: 2023-11-27

## 2022-11-21 RX ORDER — LISINOPRIL 40 MG/1
40 TABLET ORAL DAILY
Qty: 90 TABLET | Refills: 3 | Status: SHIPPED | OUTPATIENT
Start: 2022-11-21 | End: 2023-11-24

## 2022-11-21 RX ORDER — ATENOLOL 25 MG/1
25 TABLET ORAL DAILY
Qty: 90 TABLET | Refills: 3 | Status: SHIPPED | OUTPATIENT
Start: 2022-11-21 | End: 2023-11-24

## 2022-11-21 ASSESSMENT — ENCOUNTER SYMPTOMS
PALPITATIONS: 0
FATIGUE: 0
DIARRHEA: 0
SHORTNESS OF BREATH: 0
ARTHRALGIAS: 0
NUMBNESS: 0
ABDOMINAL DISTENTION: 0
HEADACHES: 0
MYALGIAS: 0
COUGH: 0
VOMITING: 0
CHEST TIGHTNESS: 0
SORE THROAT: 0
WHEEZING: 0
LIGHT-HEADEDNESS: 0
NAUSEA: 0
RHINORRHEA: 0
SLEEP DISTURBANCE: 0
ABDOMINAL PAIN: 0
NERVOUS/ANXIOUS: 0
CONSTIPATION: 0
DYSPHORIC MOOD: 0
DIZZINESS: 0

## 2022-11-21 ASSESSMENT — PAIN SCALES - GENERAL: PAINLEVEL: NO PAIN (0)

## 2022-11-21 NOTE — LETTER
November 23, 2022      Abi Lees  [address] [place] [address]        Abi,     You are not spilling any protein into your urine-this is good.     Your triglycerides are more elevated than last check.  This is expected due to being a nonfasting specimen.  Would recommend that we check your fasting lipids in 1 year.     Your kidney function, creatinine is just a bit elevated.  Please make sure you are drinking plenty of water every day.   Your blood sugar is just a bit elevated however, this is expected due to being a nonfasting specimen.         Resulted Orders   Lipid panel reflex to direct LDL Fasting   Result Value Ref Range    Cholesterol 158 <200 mg/dL    Triglycerides 342 (H) <150 mg/dL    Direct Measure HDL 49 (L) >=50 mg/dL    LDL Cholesterol Calculated 41 <=100 mg/dL    Non HDL Cholesterol 109 <130 mg/dL    Patient Fasting > 8hrs? No     Narrative    Cholesterol  Desirable:  <200 mg/dL    Triglycerides  Normal:  Less than 150 mg/dL  Borderline High:  150-199 mg/dL  High:  200-499 mg/dL  Very High:  Greater than or equal to 500 mg/dL    Direct Measure HDL  Female:  Greater than or equal to 50 mg/dL   Male:  Greater than or equal to 40 mg/dL    LDL Cholesterol  Desirable:  <100mg/dL  Above Desirable:  100-129 mg/dL   Borderline High:  130-159 mg/dL   High:  160-189 mg/dL   Very High:  >= 190 mg/dL    Non HDL Cholesterol  Desirable:  130 mg/dL  Above Desirable:  130-159 mg/dL  Borderline High:  160-189 mg/dL  High:  190-219 mg/dL  Very High:  Greater than or equal to 220 mg/dL   Basic metabolic panel   Result Value Ref Range    Sodium 139 133 - 144 mmol/L    Potassium 3.5 3.4 - 5.3 mmol/L    Chloride 105 94 - 109 mmol/L    Carbon Dioxide (CO2) 26 20 - 32 mmol/L    Anion Gap 8 3 - 14 mmol/L    Urea Nitrogen 21 7 - 30 mg/dL    Creatinine 1.11 (H) 0.52 - 1.04 mg/dL    Calcium 8.8 8.5 - 10.1 mg/dL    Glucose 103 (H) 70 - 99 mg/dL    GFR Estimate 51 (L) >60 mL/min/1.73m2      Comment:      Effective  December 21, 2021 eGFRcr in adults is calculated using the 2021 CKD-EPI creatinine equation which includes age and gender (Vianney et al., NEJM, DOI: 10.1056/YVFUff2364283)   Albumin Random Urine Quantitative with Creat Ratio   Result Value Ref Range    Creatinine Urine mg/dL 71 mg/dL    Albumin Urine mg/L 8 mg/L    Albumin Urine mg/g Cr 11.27 0.00 - 25.00 mg/g Cr       If you have any questions or concerns, please call the clinic at the number listed above.       Sincerely,      EMORY Malagon CNP

## 2022-11-21 NOTE — PROGRESS NOTES
"  Assessment & Plan     Essential hypertension with goal blood pressure less than 140/90  Stable-blood pressure well controlled today in clinic.  Refill sent.  Will update labs.  Plan to follow-up in 1 year  - atenolol (TENORMIN) 25 MG tablet; Take 1 tablet (25 mg) by mouth daily  - hydrochlorothiazide (HYDRODIURIL) 25 MG tablet; Take 1 tablet (25 mg) by mouth daily  - lisinopril (ZESTRIL) 40 MG tablet; Take 1 tablet (40 mg) by mouth daily  - Albumin Random Urine Quantitative with Creat Ratio; Future  - Basic metabolic panel; Future    Hyperlipidemia LDL goal <130  Tolerating atorvastatin well.  Refill sent.  We will recheck labs.  Plan to follow-up in 1 year  - atorvastatin (LIPITOR) 40 MG tablet; Take 1 tablet (40 mg) by mouth daily Due for follow-up refills  - Lipid panel reflex to direct LDL Fasting; Future    Neuropathy  Working well to control symptoms.  Refill sent.  Will recheck labs.  Follow-up in 1 year.  - gabapentin (NEURONTIN) 300 MG capsule; Take 1 capsule (300 mg) by mouth 4 times daily for 90 days TAKE 1 CAPSULE BY MOUTH FOUR TIMES A DAY    Declines mammogram, influenza vaccine, Tdap vaccine, pneumonia vaccine and COVID booster.    Review of the result(s) of each unique test - Labs, imaging  Ordering of each unique test  Prescription drug management  26 minutes spent on the date of the encounter doing chart review, history and exam, documentation and further activities per the note     BMI:   Estimated body mass index is 26.89 kg/m  as calculated from the following:    Height as of this encounter: 1.607 m (5' 3.25\").    Weight as of this encounter: 69.4 kg (153 lb).         Return in about 1 year (around 11/21/2023) for A Physical Exam, with fasting labs.    EMORY Mcgee CNP  [facility] [place] Alex is a 77 year old, presenting for the following health issues:  Recheck Medication      History of Present Illness       She eats 0-1 servings of fruits and " vegetables daily.She consumes 0 sweetened beverage(s) daily.She exercises with enough effort to increase her heart rate 30 to 60 minutes per day.  She exercises with enough effort to increase her heart rate 6 days per week.   She is taking medications regularly.       Hyperlipidemia Follow-Up      Are you regularly taking any medication or supplement to lower your cholesterol?   Yes- atorvastatin 40mg    Are you having muscle aches or other side effects that you think could be caused by your cholesterol lowering medication?  No    Hypertension Follow-up      Do you check your blood pressure regularly outside of the clinic? No     Are you following a low salt diet? No    Are your blood pressures ever more than 140 on the top number (systolic) OR more   than 90 on the bottom number (diastolic), for example 140/90? No     Medication Followup of Gabapentin 300mg    Taking Medication as prescribed: yes    Side Effects:  None    Medication Helping Symptoms:  yes    Laura Webb CMA    Here today for medication check.  Is not fasting.  Has been taking all medications and tolerating them well.  No negative side effects.  Does not forget to take medications.  Started working at [employer].  Working 3 to 4 days/week for 4 hours.  Really enjoys getting out of the house and talking to others.      Review of Systems   Constitutional: Negative for fatigue.   HENT: Negative for ear pain, rhinorrhea and sore throat.    Eyes: Negative for visual disturbance.   Respiratory: Negative for cough, chest tightness, shortness of breath and wheezing.    Cardiovascular: Negative for chest pain and palpitations.   Gastrointestinal: Negative for abdominal distention, abdominal pain, constipation, diarrhea, nausea and vomiting.   Endocrine: Negative for cold intolerance and heat intolerance.   Musculoskeletal: Negative for arthralgias and myalgias.   Skin: Negative for rash.   Neurological: Negative for dizziness, light-headedness, numbness  "and headaches.   Psychiatric/Behavioral: Negative for dysphoric mood and sleep disturbance. The patient is not nervous/anxious.             Objective    /74   Pulse 80   Temp 97.4  F (36.3  C) (Tympanic)   Resp 15   Ht 1.607 m (5' 3.25\")   Wt 69.4 kg (153 lb)   SpO2 97%   BMI 26.89 kg/m    Body mass index is 26.89 kg/m .  Physical Exam  Constitutional:       Appearance: Normal appearance. She is well-developed and well-nourished.   HENT:      Head: Normocephalic and atraumatic.      Right Ear: Tympanic membrane and external ear normal. No middle ear effusion.      Left Ear: Tympanic membrane and external ear normal.  No middle ear effusion.      Nose: No mucosal edema.      Mouth/Throat:      Mouth: Oropharynx is clear and moist and mucous membranes are normal.   Neck:      Thyroid: No thyromegaly.      Vascular: No carotid bruit.   Cardiovascular:      Rate and Rhythm: Normal rate and regular rhythm.      Heart sounds: Normal heart sounds.   Pulmonary:      Effort: Pulmonary effort is normal.      Breath sounds: Normal breath sounds.   Abdominal:      General: Bowel sounds are normal.      Palpations: Abdomen is soft.   Musculoskeletal:         General: No edema.   Skin:     General: Skin is warm and dry.   Neurological:      Mental Status: She is alert.   Psychiatric:         Mood and Affect: Mood and affect normal.         Behavior: Behavior normal.                  "

## 2022-11-22 LAB
ANION GAP SERPL CALCULATED.3IONS-SCNC: 8 MMOL/L (ref 3–14)
BUN SERPL-MCNC: 21 MG/DL (ref 7–30)
CALCIUM SERPL-MCNC: 8.8 MG/DL (ref 8.5–10.1)
CHLORIDE BLD-SCNC: 105 MMOL/L (ref 94–109)
CHOLEST SERPL-MCNC: 158 MG/DL
CO2 SERPL-SCNC: 26 MMOL/L (ref 20–32)
CREAT SERPL-MCNC: 1.11 MG/DL (ref 0.52–1.04)
CREAT UR-MCNC: 71 MG/DL
FASTING STATUS PATIENT QL REPORTED: NO
GFR SERPL CREATININE-BSD FRML MDRD: 51 ML/MIN/1.73M2
GLUCOSE BLD-MCNC: 103 MG/DL (ref 70–99)
HDLC SERPL-MCNC: 49 MG/DL
LDLC SERPL CALC-MCNC: 41 MG/DL
MICROALBUMIN UR-MCNC: 8 MG/L
MICROALBUMIN/CREAT UR: 11.27 MG/G CR (ref 0–25)
NONHDLC SERPL-MCNC: 109 MG/DL
POTASSIUM BLD-SCNC: 3.5 MMOL/L (ref 3.4–5.3)
SODIUM SERPL-SCNC: 139 MMOL/L (ref 133–144)
TRIGL SERPL-MCNC: 342 MG/DL

## 2022-12-06 ENCOUNTER — OFFICE VISIT (OUTPATIENT)
Dept: ORTHOPEDICS | Facility: CLINIC | Age: 77
End: 2022-12-06
Payer: COMMERCIAL

## 2022-12-06 VITALS
DIASTOLIC BLOOD PRESSURE: 60 MMHG | WEIGHT: 154 LBS | SYSTOLIC BLOOD PRESSURE: 122 MMHG | BODY MASS INDEX: 27.29 KG/M2 | HEIGHT: 63 IN

## 2022-12-06 DIAGNOSIS — M17.0 PRIMARY OSTEOARTHRITIS OF BOTH KNEES: Primary | ICD-10-CM

## 2022-12-06 PROCEDURE — 20610 DRAIN/INJ JOINT/BURSA W/O US: CPT | Mod: 50 | Performed by: PEDIATRICS

## 2022-12-06 PROCEDURE — 99213 OFFICE O/P EST LOW 20 MIN: CPT | Mod: 25 | Performed by: PEDIATRICS

## 2022-12-06 RX ADMIN — LIDOCAINE HYDROCHLORIDE 2 ML: 10 INJECTION, SOLUTION INFILTRATION; PERINEURAL at 14:41

## 2022-12-06 RX ADMIN — TRIAMCINOLONE ACETONIDE 40 MG: 40 INJECTION, SUSPENSION INTRA-ARTICULAR; INTRAMUSCULAR at 14:41

## 2022-12-06 NOTE — PROGRESS NOTES
ASSESSMENT & PLAN    Abi was seen today for recheck and recheck.    Diagnoses and all orders for this visit:    Primary osteoarthritis of both knees  -     Large Joint Injection/Arthocentesis: bilateral knee      At last visit, deferred injections. She is interested in bilateral knee steroid injections, done today.  Also reviewed other options for knees; see previous note.  Follow-up prn.  Questions answered. Discussed signs and symptoms that may indicate more serious issues; the patient was instructed to seek appropriate care if noted. Abi indicates understanding of these issues and agrees with the plan.        See Patient Instructions  Patient Instructions       Injection Discharge Instructions      You may shower, however avoid swimming, tub baths or hot tubs for 24 hours following your procedure    You may have a mild to moderate increase in pain for several days following the injection.    It may take up to 14 days for the steroid medication to start working although you may feel the effect as early as a few days after the procedure.    You may use ice packs for 10-15 minutes, 3 to 4 times a day at the injection site for comfort    You may use anti-inflammatory medications (such as Ibuprofen or Aleve or Advil) if you are able to take safely, or acetaminophen (Tylenol) for pain control if necessary    If you were fasting, you may resume your normal diet and medications after the procedure    If you have diabetes, check your blood sugar more frequently than usual as your blood sugar may be higher than normal for 10-14 days following a steroid injection. Contact your doctor who manages your diabetes if your blood sugar is higher than usual      If you experience any of the following, call the clinic @ [phone]  - Fever over 100 degree F  - Swelling, bleeding, redness, drainage, warmth at the injection site  - New or worsening pain        Jasmeet Shah [facility]  "[facility]    SUBJECTIVE- Interim History December 6, 2022    Chief Complaint   Patient presents with     Left Knee - RECHECK     Right Knee - RECHECK       Abi Lees is a 77 year old female who is seen in f/u up for Primary osteoarthritis of both knees. Since last visit on 7/16/22 patient has continued to have pain in her knees.  No new injury.  Would be interested in injections in both knees.  .  - Now ~ 6 months from initial onset    **  No interval injury.      REVIEW OF SYSTEMS:  Review of Systems      OBJECTIVE:  /60   Ht 1.607 m (5' 3.25\")   Wt 69.9 kg (154 lb)   BMI 27.06 kg/m       Bilateral Knee exam    Inspection:   Trace-mild effusion   no ecchymosis    ROM:      Some stiffness end range flexion bilat    Patellar Motion:      Crepitus noted in the patellofemoral joint bilat    Tender: joint lines        RADIOLOGY:  Previous x-ray:    EXAM: XR KNEE BILATERAL 3 VIEWS  LOCATION: [facility]  DATE/TIME: 7/16/2022 10:54 AM     INDICATION: Bilateral knee pain.  COMPARISON: None.                                                                      IMPRESSION:   RIGHT KNEE: Moderate-severe tricompartmental degenerative arthritic changes, greatest in the medial compartment where there is near complete joint space loss and osteophytes. The tibial plafond appears translated anterior relative to the femoral   condyles, raising suspicion for underlying ACL disruption/insufficiency. No significant joint effusion. Few small ossified joint bodies in the posterior knee. No fracture.     LEFT KNEE: Moderate-severe tricompartmental degenerative arthritic changes, greatest in the medial compartment where there is near complete joint space loss and osteophytes. No significant joint effusion. Small ossified joint body along the posterior   knee. No fracture.          Large Joint Injection/Arthocentesis: bilateral knee    Date/Time: 12/6/2022 2:41 PM  Performed by: Jasmeet Shah, " DO  Authorized by: Jasmeet Shah DO     Indications:  Pain and osteoarthritis  Needle Size:  25 G  Guidance: landmark guided    Approach:  Anteromedial  Location:  Knee  Laterality:  Bilateral      Medications (Right):  40 mg triamcinolone 40 MG/ML; 2 mL lidocaine 1 %  Medications (Left):  40 mg triamcinolone 40 MG/ML; 2 mL lidocaine 1 %  Outcome:  Tolerated well, no immediate complications  Procedure discussed: discussed risks, benefits, and alternatives    Consent Given by:  Patient  Timeout: timeout called immediately prior to procedure    Prep: patient was prepped and draped in usual sterile fashion

## 2022-12-06 NOTE — PATIENT INSTRUCTIONS
Injection Discharge Instructions    You may shower, however avoid swimming, tub baths or hot tubs for 24 hours following your procedure  You may have a mild to moderate increase in pain for several days following the injection.  It may take up to 14 days for the steroid medication to start working although you may feel the effect as early as a few days after the procedure.  You may use ice packs for 10-15 minutes, 3 to 4 times a day at the injection site for comfort  You may use anti-inflammatory medications (such as Ibuprofen or Aleve or Advil) if you are able to take safely, or acetaminophen (Tylenol) for pain control if necessary  If you were fasting, you may resume your normal diet and medications after the procedure  If you have diabetes, check your blood sugar more frequently than usual as your blood sugar may be higher than normal for 10-14 days following a steroid injection. Contact your doctor who manages your diabetes if your blood sugar is higher than usual    If you experience any of the following, call the clinic @ [phone]  - Fever over 100 degree F  - Swelling, bleeding, redness, drainage, warmth at the injection site  - New or worsening pain

## 2022-12-06 NOTE — LETTER
12/6/2022         RE: Abi Lees  [address]   [place] [facility] [place] [address]        Dear Colleague,    Thank you for referring your patient, Abi Lees, to the [facility]. Please see a copy of my visit note below.    ASSESSMENT & PLAN    Abi was seen today for recheck and recheck.    Diagnoses and all orders for this visit:    Primary osteoarthritis of both knees  -     Large Joint Injection/Arthocentesis: bilateral knee      At last visit, deferred injections. She is interested in bilateral knee steroid injections, done today.  Also reviewed other options for knees; see previous note.  Follow-up prn.  Questions answered. Discussed signs and symptoms that may indicate more serious issues; the patient was instructed to seek appropriate care if noted. Abi indicates understanding of these issues and agrees with the plan.        See Patient Instructions  Patient Instructions       Injection Discharge Instructions      You may shower, however avoid swimming, tub baths or hot tubs for 24 hours following your procedure    You may have a mild to moderate increase in pain for several days following the injection.    It may take up to 14 days for the steroid medication to start working although you may feel the effect as early as a few days after the procedure.    You may use ice packs for 10-15 minutes, 3 to 4 times a day at the injection site for comfort    You may use anti-inflammatory medications (such as Ibuprofen or Aleve or Advil) if you are able to take safely, or acetaminophen (Tylenol) for pain control if necessary    If you were fasting, you may resume your normal diet and medications after the procedure    If you have diabetes, check your blood sugar more frequently than usual as your blood sugar may be higher than normal for 10-14 days following a steroid injection. Contact your doctor who manages your diabetes if your blood sugar is higher than usual      If you  "experience any of the following, call the clinic @ [phone]  - Fever over 100 degree F  - Swelling, bleeding, redness, drainage, warmth at the injection site  - New or worsening pain        Jasmeet Shah DO  [facility]    SUBJECTIVE- Interim History December 6, 2022    Chief Complaint   Patient presents with     Left Knee - RECHECK     Right Knee - RECHECK       Abi Lees is a 77 year old female who is seen in f/u up for Primary osteoarthritis of both knees. Since last visit on 7/16/22 patient has continued to have pain in her knees.  No new injury.  Would be interested in injections in both knees.  .  - Now ~ 6 months from initial onset    **  No interval injury.      REVIEW OF SYSTEMS:  Review of Systems      OBJECTIVE:  /60   Ht 1.607 m (5' 3.25\")   Wt 69.9 kg (154 lb)   BMI 27.06 kg/m       Bilateral Knee exam    Inspection:   Trace-mild effusion   no ecchymosis    ROM:      Some stiffness end range flexion bilat    Patellar Motion:      Crepitus noted in the patellofemoral joint bilat    Tender: joint lines        RADIOLOGY:  Previous x-ray:    EXAM: XR KNEE BILATERAL 3 VIEWS  LOCATION: [facility]  DATE/TIME: 7/16/2022 10:54 AM     INDICATION: Bilateral knee pain.  COMPARISON: None.                                                                      IMPRESSION:   RIGHT KNEE: Moderate-severe tricompartmental degenerative arthritic changes, greatest in the medial compartment where there is near complete joint space loss and osteophytes. The tibial plafond appears translated anterior relative to the femoral   condyles, raising suspicion for underlying ACL disruption/insufficiency. No significant joint effusion. Few small ossified joint bodies in the posterior knee. No fracture.     LEFT KNEE: Moderate-severe tricompartmental degenerative arthritic changes, greatest in the medial compartment where there is near complete " joint space loss and osteophytes. No significant joint effusion. Small ossified joint body along the posterior   knee. No fracture.          Large Joint Injection/Arthocentesis: bilateral knee    Date/Time: 12/6/2022 2:41 PM  Performed by: Jasmeet Shah DO  Authorized by: Jasmeet Shah DO     Indications:  Pain and osteoarthritis  Needle Size:  25 G  Guidance: landmark guided    Approach:  Anteromedial  Location:  Knee  Laterality:  Bilateral      Medications (Right):  40 mg triamcinolone 40 MG/ML; 2 mL lidocaine 1 %  Medications (Left):  40 mg triamcinolone 40 MG/ML; 2 mL lidocaine 1 %  Outcome:  Tolerated well, no immediate complications  Procedure discussed: discussed risks, benefits, and alternatives    Consent Given by:  Patient  Timeout: timeout called immediately prior to procedure    Prep: patient was prepped and draped in usual sterile fashion                         Again, thank you for allowing me to participate in the care of your patient.        Sincerely,        Jasmeet Shah DO

## 2022-12-22 RX ORDER — LIDOCAINE HYDROCHLORIDE 10 MG/ML
2 INJECTION, SOLUTION INFILTRATION; PERINEURAL
Status: DISCONTINUED | OUTPATIENT
Start: 2022-12-06 | End: 2024-05-03

## 2022-12-22 RX ORDER — TRIAMCINOLONE ACETONIDE 40 MG/ML
40 INJECTION, SUSPENSION INTRA-ARTICULAR; INTRAMUSCULAR
Status: DISCONTINUED | OUTPATIENT
Start: 2022-12-06 | End: 2024-05-03

## 2023-03-17 NOTE — ANESTHESIA PROCEDURE NOTES
Peripheral nerve/Neuraxial procedure note : intrathecal  Pre-Procedure  Performed by  Barbra Montiel APRN CRNA   Location: OR    Procedure Times:3/25/2019 10:20 AM and 3/25/2019 10:23 AM  Pre-Anesthestic Checklist: patient identified, IV checked, site marked, risks and benefits discussed, informed consent, monitors and equipment checked, pre-op evaluation and at physician/surgeon's request    Timeout  Correct Patient: Yes   Correct Procedure: Yes   Correct Site: Yes   Correct Laterality: N/A   Correct Position: Yes   Site Marked: N/A   .   Procedure Documentation  ASA 3  Diagnosis:rectocele, cystocele, vulvar condyloma.    Procedure:    Intrathecal.  Insertion Site:L3-4  (midline approach)      Patient Prep;mask, sterile gloves, povidone-iodine 7.5% surgical scrub, patient draped.  .  Needle: Mel tip Spinal Needle (gauge): 27  Spinal/LP Needle Length (inches): 3.5 # of attempts: 1 and # of redirects:  Introducer used .       Assessment/Narrative  Paresthesias: No.  .  .  clear CSF fluid removed . Time Injected: 10:23  Comments:  VAS pain score prior to injection:    VAS pain score after injection:    FHR stable, pt. Tolerated well.         88-year-old female with w/ PMH HTN, HLD, cardiomyopathy who presents to the ED for near syncope, also found to have cystitis, admitted for further workup.  88-year-old female with w/ PMH HTN, HLD, cardiomyopathy who presents to the ED for near syncope, also found to have cystitis, admitted for further workup.

## 2023-04-26 ENCOUNTER — PATIENT OUTREACH (OUTPATIENT)
Dept: FAMILY MEDICINE | Facility: CLINIC | Age: 78
End: 2023-04-26
Payer: COMMERCIAL

## 2023-04-26 NOTE — TELEPHONE ENCOUNTER
Patient Quality Outreach    Patient is due for the following:   Physical Annual Wellness Visit    Next Steps:   Schedule a Annual Wellness Visit    Type of outreach:    Sent D4P message.      Questions for provider review:    None     Laura Junior CMA  Chart routed to Care Team.

## 2023-05-16 ENCOUNTER — OFFICE VISIT (OUTPATIENT)
Dept: ORTHOPEDICS | Facility: CLINIC | Age: 78
End: 2023-05-16
Payer: COMMERCIAL

## 2023-05-16 VITALS
DIASTOLIC BLOOD PRESSURE: 78 MMHG | BODY MASS INDEX: 27.64 KG/M2 | WEIGHT: 156 LBS | SYSTOLIC BLOOD PRESSURE: 132 MMHG | HEIGHT: 63 IN

## 2023-05-16 DIAGNOSIS — M17.0 PRIMARY OSTEOARTHRITIS OF BOTH KNEES: Primary | ICD-10-CM

## 2023-05-16 PROCEDURE — 20610 DRAIN/INJ JOINT/BURSA W/O US: CPT | Mod: 50 | Performed by: PEDIATRICS

## 2023-05-16 RX ORDER — TRIAMCINOLONE ACETONIDE 40 MG/ML
40 INJECTION, SUSPENSION INTRA-ARTICULAR; INTRAMUSCULAR
Status: DISCONTINUED | OUTPATIENT
Start: 2023-05-16 | End: 2024-05-03

## 2023-05-16 RX ORDER — LIDOCAINE HYDROCHLORIDE 10 MG/ML
2 INJECTION, SOLUTION INFILTRATION; PERINEURAL
Status: DISCONTINUED | OUTPATIENT
Start: 2023-05-16 | End: 2024-05-03

## 2023-05-16 RX ADMIN — TRIAMCINOLONE ACETONIDE 40 MG: 40 INJECTION, SUSPENSION INTRA-ARTICULAR; INTRAMUSCULAR at 10:25

## 2023-05-16 RX ADMIN — LIDOCAINE HYDROCHLORIDE 2 ML: 10 INJECTION, SOLUTION INFILTRATION; PERINEURAL at 10:25

## 2023-05-16 NOTE — PROGRESS NOTES
"ASSESSMENT & PLAN    Abi was seen today for follow up and follow up.    Diagnoses and all orders for this visit:    Primary osteoarthritis of both knees  -     Large Joint Injection/Arthocentesis: bilateral knee          See Patient Instructions  Patient Instructions   Bilateral knee steroid injection repeated today, given good results from previous injection.  We briefly discussed alternative injection can be Visco supplement (\"gel\" or \"lubricating\" shot), and also briefly reviewed considerations around physical therapy, bracing, as options for knee arthritis.  Monitor course with repeat steroid injection done today, and follow-up is open-ended.  Contact clinic for any questions or concerns.    If you have any further questions for your physician or physician s care team you can contact them thru [email or web address] or by calling  [phone] and use option 3 to leave a voice message.   Messages received during business hours will be returned same day.            Injection Discharge Instructions      You may shower, however avoid swimming, tub baths or hot tubs for 24 hours following your procedure    You may have a mild to moderate increase in pain for several days following the injection.    It may take up to 14 days for the steroid medication to start working although you may feel the effect as early as a few days after the procedure.    You may use ice packs for 10-15 minutes, 3 to 4 times a day at the injection site for comfort    You may use anti-inflammatory medications (such as Ibuprofen or Aleve or Advil) if you are able to take safely, or acetaminophen (Tylenol) for pain control if necessary    If you were fasting, you may resume your normal diet and medications after the procedure    If you have diabetes, check your blood sugar more frequently than usual as your blood sugar may be higher than normal for 10-14 days following a steroid injection. Contact your doctor who manages your diabetes if your blood sugar is " "higher than usual      If you experience any of the following, call the clinic @ [phone]  - Fever over 100 degree F  - Swelling, bleeding, redness, drainage, warmth at the injection site  - New or worsening pain        Jasmeet Shah DO  [facility]    SUBJECTIVE- Interim History May 16, 2023    Chief Complaint   Patient presents with     Left Knee - Follow Up     Right Knee - Follow Up       Abi Lees is a 77 year old female who is seen in f/u up for Primary osteoarthritis of both knees. Since last visit on 12/6/2022 patient has felt really good but is now getting more pain. Reports that the knees felt great for 4 months and now is getting increased pain over time, especially doing yard-work while on her knees.    The patient is seen with their .    **      REVIEW OF SYSTEMS:  Review of Systems    OBJECTIVE:  /78   Ht 1.6 m (5' 3\")   Wt 70.8 kg (156 lb)   BMI 27.63 kg/m       Bilateral knee with mild effusion noted  No significant areas of tenderness, no significant limitation in ROM    RADIOLOGY:  Previous x-ray:    EXAM: XR KNEE BILATERAL 3 VIEWS  LOCATION: [facility]  DATE/TIME: 7/16/2022 10:54 AM     INDICATION: Bilateral knee pain.  COMPARISON: None.                                                                      IMPRESSION:   RIGHT KNEE: Moderate-severe tricompartmental degenerative arthritic changes, greatest in the medial compartment where there is near complete joint space loss and osteophytes. The tibial plafond appears translated anterior relative to the femoral   condyles, raising suspicion for underlying ACL disruption/insufficiency. No significant joint effusion. Few small ossified joint bodies in the posterior knee. No fracture.     LEFT KNEE: Moderate-severe tricompartmental degenerative arthritic changes, greatest in the medial compartment where there is near complete joint space loss and " osteophytes. No significant joint effusion. Small ossified joint body along the posterior   knee. No fracture.               Large Joint Injection/Arthocentesis: bilateral knee    Date/Time: 5/16/2023 10:25 AM    Performed by: Jasmeet Shah DO  Authorized by: Jasmeet Shah DO    Indications:  Pain and osteoarthritis  Needle Size:  25 G  Guidance: landmark guided    Approach:  Anteromedial  Location:  Knee  Laterality:  Bilateral      Medications (Right):  40 mg triamcinolone 40 MG/ML; 2 mL lidocaine 1 %  Medications (Left):  40 mg triamcinolone 40 MG/ML; 2 mL lidocaine 1 %  Outcome:  Tolerated well, no immediate complications  Procedure discussed: discussed risks, benefits, and alternatives    Consent Given by:  Patient  Timeout: timeout called immediately prior to procedure    Prep: patient was prepped and draped in usual sterile fashion              15 minutes spent by me on the date of the encounter doing chart review, history and exam, documentation and further activities per the note

## 2023-05-16 NOTE — PATIENT INSTRUCTIONS
"Bilateral knee steroid injection repeated today, given good results from previous injection.  We briefly discussed alternative injection can be Visco supplement (\"gel\" or \"lubricating\" shot), and also briefly reviewed considerations around physical therapy, bracing, as options for knee arthritis.  Monitor course with repeat steroid injection done today, and follow-up is open-ended.  Contact clinic for any questions or concerns.    If you have any further questions for your physician or physician s care team you can contact them thru [employer] or by calling  [phone] and use option 3 to leave a voice message.   Messages received during business hours will be returned same day.            Injection Discharge Instructions    You may shower, however avoid swimming, tub baths or hot tubs for 24 hours following your procedure  You may have a mild to moderate increase in pain for several days following the injection.  It may take up to 14 days for the steroid medication to start working although you may feel the effect as early as a few days after the procedure.  You may use ice packs for 10-15 minutes, 3 to 4 times a day at the injection site for comfort  You may use anti-inflammatory medications (such as Ibuprofen or Aleve or Advil) if you are able to take safely, or acetaminophen (Tylenol) for pain control if necessary  If you were fasting, you may resume your normal diet and medications after the procedure  If you have diabetes, check your blood sugar more frequently than usual as your blood sugar may be higher than normal for 10-14 days following a steroid injection. Contact your doctor who manages your diabetes if your blood sugar is higher than usual    If you experience any of the following, call the clinic @ [phone]  - Fever over 100 degree F  - Swelling, bleeding, redness, drainage, warmth at the injection site  - New or worsening pain    "

## 2023-05-16 NOTE — LETTER
"    5/16/2023         RE: Abi Lees  [address]   [place] [facility] [address]        Dear Colleague,    Thank you for referring your patient, Abi Lees, to the [facility]. Please see a copy of my visit note below.    ASSESSMENT & PLAN    Abi was seen today for follow up and follow up.    Diagnoses and all orders for this visit:    Primary osteoarthritis of both knees  -     Large Joint Injection/Arthocentesis: bilateral knee          See Patient Instructions  Patient Instructions   Bilateral knee steroid injection repeated today, given good results from previous injection.  We briefly discussed alternative injection can be Visco supplement (\"gel\" or \"lubricating\" shot), and also briefly reviewed considerations around physical therapy, bracing, as options for knee arthritis.  Monitor course with repeat steroid injection done today, and follow-up is open-ended.  Contact clinic for any questions or concerns.    If you have any further questions for your physician or physician s care team you can contact them thru [employer] or by calling  [phone] and use option 3 to leave a voice message.   Messages received during business hours will be returned same day.            Injection Discharge Instructions      You may shower, however avoid swimming, tub baths or hot tubs for 24 hours following your procedure    You may have a mild to moderate increase in pain for several days following the injection.    It may take up to 14 days for the steroid medication to start working although you may feel the effect as early as a few days after the procedure.    You may use ice packs for 10-15 minutes, 3 to 4 times a day at the injection site for comfort    You may use anti-inflammatory medications (such as Ibuprofen or Aleve or Advil) if you are able to take safely, or acetaminophen (Tylenol) for pain control if necessary    If you were fasting, you may resume your normal diet and medications " "after the procedure    If you have diabetes, check your blood sugar more frequently than usual as your blood sugar may be higher than normal for 10-14 days following a steroid injection. Contact your doctor who manages your diabetes if your blood sugar is higher than usual      If you experience any of the following, call the clinic @ [phone]  - Fever over 100 degree F  - Swelling, bleeding, redness, drainage, warmth at the injection site  - New or worsening pain        Jasmeet Shah DO  [facility]    SUBJECTIVE- Interim History May 16, 2023    Chief Complaint   Patient presents with     Left Knee - Follow Up     Right Knee - Follow Up       Abi Lees is a 77 year old female who is seen in f/u up for Primary osteoarthritis of both knees. Since last visit on 12/6/2022 patient has felt really good but is now getting more pain. Reports that the knees felt great for 4 months and now is getting increased pain over time, especially doing yard-work while on her knees.    The patient is seen with their .    **      REVIEW OF SYSTEMS:  Review of Systems    OBJECTIVE:  /78   Ht 1.6 m (5' 3\")   Wt 70.8 kg (156 lb)   BMI 27.63 kg/m       Bilateral knee with mild effusion noted  No significant areas of tenderness, no significant limitation in ROM    RADIOLOGY:  Previous x-ray:    EXAM: XR KNEE BILATERAL 3 VIEWS  LOCATION: [facility]  DATE/TIME: 7/16/2022 10:54 AM     INDICATION: Bilateral knee pain.  COMPARISON: None.                                                                      IMPRESSION:   RIGHT KNEE: Moderate-severe tricompartmental degenerative arthritic changes, greatest in the medial compartment where there is near complete joint space loss and osteophytes. The tibial plafond appears translated anterior relative to the femoral   condyles, raising suspicion for underlying ACL disruption/insufficiency. No significant " joint effusion. Few small ossified joint bodies in the posterior knee. No fracture.     LEFT KNEE: Moderate-severe tricompartmental degenerative arthritic changes, greatest in the medial compartment where there is near complete joint space loss and osteophytes. No significant joint effusion. Small ossified joint body along the posterior   knee. No fracture.               Large Joint Injection/Arthocentesis: bilateral knee    Date/Time: 5/16/2023 10:25 AM    Performed by: Jasmeet Shah DO  Authorized by: Jasmeet Shah DO    Indications:  Pain and osteoarthritis  Needle Size:  25 G  Guidance: landmark guided    Approach:  Anteromedial  Location:  Knee  Laterality:  Bilateral      Medications (Right):  40 mg triamcinolone 40 MG/ML; 2 mL lidocaine 1 %  Medications (Left):  40 mg triamcinolone 40 MG/ML; 2 mL lidocaine 1 %  Outcome:  Tolerated well, no immediate complications  Procedure discussed: discussed risks, benefits, and alternatives    Consent Given by:  Patient  Timeout: timeout called immediately prior to procedure    Prep: patient was prepped and draped in usual sterile fashion              15 minutes spent by me on the date of the encounter doing chart review, history and exam, documentation and further activities per the note         Again, thank you for allowing me to participate in the care of your patient.        Sincerely,        Jasmeet Shah DO

## 2023-09-26 ENCOUNTER — OFFICE VISIT (OUTPATIENT)
Dept: ORTHOPEDICS | Facility: CLINIC | Age: 78
End: 2023-09-26
Payer: COMMERCIAL

## 2023-09-26 VITALS — WEIGHT: 156 LBS | BODY MASS INDEX: 27.64 KG/M2 | HEIGHT: 63 IN

## 2023-09-26 DIAGNOSIS — M17.0 PRIMARY OSTEOARTHRITIS OF BOTH KNEES: Primary | ICD-10-CM

## 2023-09-26 PROCEDURE — 99214 OFFICE O/P EST MOD 30 MIN: CPT | Performed by: PEDIATRICS

## 2023-09-26 NOTE — PROGRESS NOTES
ASSESSMENT & PLAN    Abi was seen today for follow up and follow up.    Diagnoses and all orders for this visit:    Primary osteoarthritis of both knees      Discussed primarily potential for injection, among other options below. With injection, discussed repeat landmark based, trial with US guidance, and trial visco (requires prior auth).  Plan imaging-guided steroid injection right knee (more symptomatic side) +/- aspiration.  Questions answered. Discussed signs and symptoms that may indicate more serious issues; the patient was instructed to seek appropriate care if noted. Abi indicates understanding of these issues and agrees with the plan.      See Patient Instructions  Patient Instructions   Reviewed course with the knee pain, particularly more on the right.  I think this remains most consistent with underlying osteoarthritis.  We discussed symptom management with icing, over-the-counter medication.  Would avoid heat with the swelling noted currently.  Reviewed again activity modification, generally okay to remain active if comfortable.  If interested in referral to physical therapy, contact clinic.  PT can be helpful with overall movement, pain, function, and sometimes with swelling.  Discussed support options.  Okay to continue with compression sleeve/wrap.  We also discussed potential trial of  brace through PT.  If interested in this, contact clinic.  For medications, okay to continue with Voltaren gel.  Reviewed injection options.  This includes repeat with steroid (landmark based, versus with imaging guidance), versus Visco supplement injection.  Discussion, plan to get you set up with one of my colleagues for imaging guided steroid injection to the knee, with aspiration as well.  Finally, additional consideration is referral on to discuss further with orthopedic surgery, particularly if other options not beneficial.  Monitor course with imaging guided aspiration and injection for 2 weeks  "to begin.  Contact clinic if any other questions or concerns in the meantime.    If you have any further questions for your physician or physician s care team you can contact them thru MyChart or by calling [phone].      Jasmeet Shah [facility]    SUBJECTIVE- Interim History September 26, 2023    Chief Complaint   Patient presents with    Left Knee - Follow Up    Right Knee - Follow Up       Abi Lees is a 78 year old female who is seen in f/u up for Primary osteoarthritis of both knees. Since last visit on 5/16/23 patient has felt that there is swelling and pain in the right knee. Worsened by walking. No new falls or Injuries.    Bilateral Knee Steroid Injections done on 5/16/23 provided relief for 4-6 weeks, feels as this is less effective.      The patient is seen with their .    Orthopedic/Surgical history: YES - right knee; previously had menisectomy on right knee.   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  Additional history:  Right knee is primary issue for today. Left knee generally ok, occasional twinge, some aching.  Right knee warm, swelling, pain.  Right knee pain can be diffuse, can be medial or lateral, can vary.  Limping with pain. Working part time.          REVIEW OF SYSTEMS:  Review of Systems    OBJECTIVE:  Ht 1.6 m (5' 3\")   Wt 70.8 kg (156 lb)   BMI 27.63 kg/m     General: healthy, alert and in no distress  Skin: no suspicious lesions or rash.  CV: distal perfusion intact   Resp: normal respiratory effort without conversational dyspnea   Psych: normal mood and affect  Gait: mild antalgic, ambulates independently  Neuro: Normal light sensory exam of extremity       Left Knee exam    Inspection:   + mild-mod effusion   no ecchymosis  No erythema    ROM:      Flexion >100-110 " degrees       Extension lacking 5-10 degrees actively, full passive       Range of motion limited by stiffness    Tender: medial joint line mild    Special Tests:      neg (-) anterior drawer       neg (-) posterior drawer       neg (-) varus        neg (-) valgus           RADIOLOGY:  None new.    Previous x-rays:    EXAM: XR KNEE BILATERAL 3 VIEWS  LOCATION: [facility]  DATE/TIME: 7/16/2022 10:54 AM     INDICATION: Bilateral knee pain.  COMPARISON: None.                                                                      IMPRESSION:   RIGHT KNEE: Moderate-severe tricompartmental degenerative arthritic changes, greatest in the medial compartment where there is near complete joint space loss and osteophytes. The tibial plafond appears translated anterior relative to the femoral   condyles, raising suspicion for underlying ACL disruption/insufficiency. No significant joint effusion. Few small ossified joint bodies in the posterior knee. No fracture.     LEFT KNEE: Moderate-severe tricompartmental degenerative arthritic changes, greatest in the medial compartment where there is near complete joint space loss and osteophytes. No significant joint effusion. Small ossified joint body along the posterior   knee. No fracture.          25 minutes spent by me on the date of the encounter doing chart review, history and exam, documentation and further activities per the note

## 2023-09-26 NOTE — PATIENT INSTRUCTIONS
Reviewed course with the knee pain, particularly more on the right.  I think this remains most consistent with underlying osteoarthritis.  We discussed symptom management with icing, over-the-counter medication.  Would avoid heat with the swelling noted currently.  Reviewed again activity modification, generally okay to remain active if comfortable.  If interested in referral to physical therapy, contact clinic.  PT can be helpful with overall movement, pain, function, and sometimes with swelling.  Discussed support options.  Okay to continue with compression sleeve/wrap.  We also discussed potential trial of  brace through PT.  If interested in this, contact clinic.  For medications, okay to continue with Voltaren gel.  Reviewed injection options.  This includes repeat with steroid (landmark based, versus with imaging guidance), versus Visco supplement injection.  Discussion, plan to get you set up with one of my colleagues for imaging guided steroid injection to the knee, with aspiration as well.  Finally, additional consideration is referral on to discuss further with orthopedic surgery, particularly if other options not beneficial.  Monitor course with imaging guided aspiration and injection for 2 weeks to begin.  Contact clinic if any other questions or concerns in the meantime.    If you have any further questions for your physician or physician s care team you can contact them thru [employer] or by calling [phone].

## 2023-09-26 NOTE — LETTER
9/26/2023         RE: Abi Lees  [address]   [place] [facility] [address]        Dear Colleague,    Thank you for referring your patient, Abi Lees, to the [facility]. Please see a copy of my visit note below.    ASSESSMENT & PLAN    Abi was seen today for follow up and follow up.    Diagnoses and all orders for this visit:    Primary osteoarthritis of both knees      Discussed primarily potential for injection, among other options below. With injection, discussed repeat landmark based, trial with US guidance, and trial visco (requires prior auth).  Plan imaging-guided steroid injection right knee (more symptomatic side) +/- aspiration.  Questions answered. Discussed signs and symptoms that may indicate more serious issues; the patient was instructed to seek appropriate care if noted. Abi indicates understanding of these issues and agrees with the plan.      See Patient Instructions  Patient Instructions   Reviewed course with the knee pain, particularly more on the right.  I think this remains most consistent with underlying osteoarthritis.  We discussed symptom management with icing, over-the-counter medication.  Would avoid heat with the swelling noted currently.  Reviewed again activity modification, generally okay to remain active if comfortable.  If interested in referral to physical therapy, contact clinic.  PT can be helpful with overall movement, pain, function, and sometimes with swelling.  Discussed support options.  Okay to continue with compression sleeve/wrap.  We also discussed potential trial of  brace through PT.  If interested in this, contact clinic.  For medications, okay to continue with Voltaren gel.  Reviewed injection options.  This includes repeat with steroid (landmark based, versus with imaging guidance), versus Visco supplement injection.  Discussion, plan to get you set up with one of my colleagues for imaging guided steroid injection  "to the knee, with aspiration as well.  Finally, additional consideration is referral on to discuss further with orthopedic surgery, particularly if other options not beneficial.  Monitor course with imaging guided aspiration and injection for 2 weeks to begin.  Contact clinic if any other questions or concerns in the meantime.    If you have any further questions for your physician or physician s care team you can contact them thru MyChart or by calling [phone].      J[facility]meet HayNov[facility]    SUBJECTIVE- Interim History September 26, 2023    Chief Complaint   Patient presents with     Left Knee - Follow Up     Right Knee - Follow Up       Abi Lees is a 78 year old female who is seen in f/u up for Primary osteoarthritis of both knees. Since last visit on 5/16/23 patient has felt that there is swelling and pain in the right knee. Worsened by walking. No new falls or Injuries.    Bilateral Knee Steroid Injections done on 5/16/23 provided relief for 4-6 weeks, feels as this is less effective.      The patient is seen with their .    Orthopedic/Surgical history: YES - right knee; previously had menisectomy on right knee.   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  Additional history:  Right knee is primary issue for today. Left knee generally ok, occasional twinge, some aching.  Right knee warm, swelling, pain.  Right knee pain can be diffuse, can be medial or lateral, can vary.  Limping with pain. Working part time.          REVIEW OF SYSTEMS:  Review of Systems    OBJECTIVE:  Ht 1.6 m (5' 3\")   Wt 70.8 kg (156 lb)   BMI 27.63 kg/m     General: healthy, alert and in no distress  Skin: no suspicious lesions or rash.  CV: distal perfusion intact   Resp: normal respiratory effort without " conversational dyspnea   Psych: normal mood and affect  Gait: mild antalgic, ambulates independently  Neuro: Normal light sensory exam of extremity       Left Knee exam    Inspection:   + mild-mod effusion   no ecchymosis  No erythema    ROM:      Flexion >100-110 degrees       Extension lacking 5-10 degrees actively, full passive       Range of motion limited by stiffness    Tender: medial joint line mild    Special Tests:      neg (-) anterior drawer       neg (-) posterior drawer       neg (-) varus        neg (-) valgus           RADIOLOGY:  None new.    Previous x-rays:    EXAM: XR KNEE BILATERAL 3 VIEWS  LOCATION: [facility]  DATE/TIME: 7/16/2022 10:54 AM     INDICATION: Bilateral knee pain.  COMPARISON: None.                                                                      IMPRESSION:   RIGHT KNEE: Moderate-severe tricompartmental degenerative arthritic changes, greatest in the medial compartment where there is near complete joint space loss and osteophytes. The tibial plafond appears translated anterior relative to the femoral   condyles, raising suspicion for underlying ACL disruption/insufficiency. No significant joint effusion. Few small ossified joint bodies in the posterior knee. No fracture.     LEFT KNEE: Moderate-severe tricompartmental degenerative arthritic changes, greatest in the medial compartment where there is near complete joint space loss and osteophytes. No significant joint effusion. Small ossified joint body along the posterior   knee. No fracture.          25 minutes spent by me on the date of the encounter doing chart review, history and exam, documentation and further activities per the note           Again, thank you for allowing me to participate in the care of your patient.        Sincerely,        Jasmeet Shah DO

## 2023-09-30 ENCOUNTER — HEALTH MAINTENANCE LETTER (OUTPATIENT)
Age: 78
End: 2023-09-30

## 2023-10-17 ENCOUNTER — OFFICE VISIT (OUTPATIENT)
Dept: ORTHOPEDICS | Facility: CLINIC | Age: 78
End: 2023-10-17
Payer: COMMERCIAL

## 2023-10-17 DIAGNOSIS — M17.0 PRIMARY OSTEOARTHRITIS OF BOTH KNEES: Primary | ICD-10-CM

## 2023-10-17 PROCEDURE — 20611 DRAIN/INJ JOINT/BURSA W/US: CPT | Mod: RT | Performed by: FAMILY MEDICINE

## 2023-10-17 RX ORDER — BETAMETHASONE SODIUM PHOSPHATE AND BETAMETHASONE ACETATE 3; 3 MG/ML; MG/ML
6 INJECTION, SUSPENSION INTRA-ARTICULAR; INTRALESIONAL; INTRAMUSCULAR; SOFT TISSUE
Status: DISCONTINUED | OUTPATIENT
Start: 2023-10-17 | End: 2024-05-03

## 2023-10-17 RX ORDER — ROPIVACAINE HYDROCHLORIDE 5 MG/ML
4 INJECTION, SOLUTION EPIDURAL; INFILTRATION; PERINEURAL
Status: DISCONTINUED | OUTPATIENT
Start: 2023-10-17 | End: 2024-05-03

## 2023-10-17 RX ADMIN — ROPIVACAINE HYDROCHLORIDE 4 ML: 5 INJECTION, SOLUTION EPIDURAL; INFILTRATION; PERINEURAL at 09:57

## 2023-10-17 RX ADMIN — BETAMETHASONE SODIUM PHOSPHATE AND BETAMETHASONE ACETATE 6 MG: 3; 3 INJECTION, SUSPENSION INTRA-ARTICULAR; INTRALESIONAL; INTRAMUSCULAR; SOFT TISSUE at 09:57

## 2023-10-17 NOTE — PROGRESS NOTES
Large Joint Injection/Arthocentesis: R knee joint    Date/Time: 10/17/2023 9:57 AM    Performed by: Nasir Juarez MD  Authorized by: Nasir Juarez MD    Indications:  Pain and osteoarthritis  Needle Size:  21 G  Guidance: ultrasound    Approach:  Superolateral  Location:  Knee      Medications:  6 mg betamethasone acet & sod phos 6 (3-3) MG/ML; 4 mL ROPivacaine 5 MG/ML  Outcome:  Tolerated well, no immediate complications  Procedure discussed: discussed risks, benefits, and alternatives    Consent Given by:  Patient  Timeout: timeout called immediately prior to procedure    Prep: patient was prepped and draped in usual sterile fashion     Ultrasound images of procedure were permanently stored.   Referred by Dr. Shah     Patient reported significant improvement of pain after the numbing portion right knee joint steroid injection.  Ultrasound guided images were permanently stored.  Aftercare instructions given to patient.  Plan to follow-up as previously discussed with referring provider.     Nasir Juarez MD [facility]

## 2023-10-17 NOTE — PATIENT INSTRUCTIONS
[facility] Injection Discharge Instructions    Procedure: Right knee steroid injection    You may shower, however avoid swimming, tub baths or hot tubs for 24 hours following your procedure  You may have a mild to moderate increase in pain for several days following the injection.  It may take up to 14 days for the steroid medication to start working although you may feel the effect as early as a few days after the procedure.  You may use ice packs for 10-15 minutes, 3 to 4 times a day at the injection site for comfort  You may use anti-inflammatory medications (such as Ibuprofen or Aleve or Advil) or Tylenol for pain control if necessary  If you were fasting, you may resume your normal diet and medications after the procedure  If you have diabetes, check your blood sugar more frequently than usual as your blood sugar may be higher than normal for 10-14 days following a steroid injection. Contact your doctor who manages your diabetes if your blood sugar is higher than usual    If you experience any of the following, call [facility] @ [phone] or [phone]  -Fever over 100 degree F  -Swelling, bleeding, redness, drainage, warmth at the injection site  - New or worsening pain     It was great seeing you today!    aNsir Juarez

## 2023-10-17 NOTE — LETTER
10/17/2023         RE: Abi Lees  [address]   [place] [facility] [address]        Dear Colleague,    Thank you for referring your patient, Abi Lees, to the [facility]. Please see a copy of my visit note below.        Large Joint Injection/Arthocentesis: R knee joint    Date/Time: 10/17/2023 9:57 AM    Performed by: Nasir Juarez MD  Authorized by: Nasir Juarez MD    Indications:  Pain and osteoarthritis  Needle Size:  21 G  Guidance: ultrasound    Approach:  Superolateral  Location:  Knee      Medications:  6 mg betamethasone acet & sod phos 6 (3-3) MG/ML; 4 mL ROPivacaine 5 MG/ML  Outcome:  Tolerated well, no immediate complications  Procedure discussed: discussed risks, benefits, and alternatives    Consent Given by:  Patient  Timeout: timeout called immediately prior to procedure    Prep: patient was prepped and draped in usual sterile fashion     Ultrasound images of procedure were permanently stored.   Referred by Dr. Shah     Patient reported significant improvement of pain after the numbing portion right knee joint steroid injection.  Ultrasound guided images were permanently stored.  Aftercare instructions given to patient.  Plan to follow-up as previously discussed with referring provider.     Nasir Juarez MD [facility]              Again, thank you for allowing me to participate in the care of your patient.        Sincerely,        Nasir Juarez MD

## 2023-11-22 DIAGNOSIS — I10 ESSENTIAL HYPERTENSION WITH GOAL BLOOD PRESSURE LESS THAN 140/90: ICD-10-CM

## 2023-11-22 DIAGNOSIS — E78.5 HYPERLIPIDEMIA LDL GOAL <130: ICD-10-CM

## 2023-11-24 RX ORDER — ATORVASTATIN CALCIUM 40 MG/1
40 TABLET, FILM COATED ORAL DAILY
Qty: 90 TABLET | Refills: 0 | Status: SHIPPED | OUTPATIENT
Start: 2023-11-24 | End: 2024-02-22

## 2023-11-24 RX ORDER — LISINOPRIL 40 MG/1
40 TABLET ORAL DAILY
Qty: 90 TABLET | Refills: 0 | Status: SHIPPED | OUTPATIENT
Start: 2023-11-24 | End: 2024-02-22

## 2023-11-24 RX ORDER — ATENOLOL 25 MG/1
25 TABLET ORAL DAILY
Qty: 90 TABLET | Refills: 0 | Status: SHIPPED | OUTPATIENT
Start: 2023-11-24 | End: 2024-02-22

## 2023-11-24 RX ORDER — HYDROCHLOROTHIAZIDE 25 MG/1
25 TABLET ORAL DAILY
Qty: 90 TABLET | Refills: 0 | Status: SHIPPED | OUTPATIENT
Start: 2023-11-24 | End: 2024-02-20

## 2023-11-25 DIAGNOSIS — G62.9 NEUROPATHY: ICD-10-CM

## 2023-11-27 RX ORDER — GABAPENTIN 300 MG/1
CAPSULE ORAL
Qty: 120 CAPSULE | Refills: 0 | Status: SHIPPED | OUTPATIENT
Start: 2023-11-27 | End: 2024-01-23

## 2024-01-22 DIAGNOSIS — G62.9 NEUROPATHY: ICD-10-CM

## 2024-01-23 RX ORDER — GABAPENTIN 300 MG/1
CAPSULE ORAL
Qty: 120 CAPSULE | Refills: 0 | Status: SHIPPED | OUTPATIENT
Start: 2024-01-23 | End: 2024-02-20

## 2024-02-20 DIAGNOSIS — I10 ESSENTIAL HYPERTENSION WITH GOAL BLOOD PRESSURE LESS THAN 140/90: ICD-10-CM

## 2024-02-20 DIAGNOSIS — G62.9 NEUROPATHY: ICD-10-CM

## 2024-02-20 RX ORDER — GABAPENTIN 300 MG/1
CAPSULE ORAL
Qty: 120 CAPSULE | Refills: 0 | Status: SHIPPED | OUTPATIENT
Start: 2024-02-20 | End: 2024-03-27

## 2024-02-20 RX ORDER — HYDROCHLOROTHIAZIDE 25 MG/1
25 TABLET ORAL DAILY
Qty: 30 TABLET | Refills: 0 | Status: SHIPPED | OUTPATIENT
Start: 2024-02-20 | End: 2024-03-20

## 2024-02-22 DIAGNOSIS — I10 ESSENTIAL HYPERTENSION WITH GOAL BLOOD PRESSURE LESS THAN 140/90: ICD-10-CM

## 2024-02-22 DIAGNOSIS — E78.5 HYPERLIPIDEMIA LDL GOAL <130: ICD-10-CM

## 2024-02-22 RX ORDER — LISINOPRIL 40 MG/1
40 TABLET ORAL DAILY
Qty: 45 TABLET | Refills: 0 | Status: SHIPPED | OUTPATIENT
Start: 2024-02-22 | End: 2024-03-27

## 2024-02-22 RX ORDER — ATENOLOL 25 MG/1
25 TABLET ORAL DAILY
Qty: 45 TABLET | Refills: 0 | Status: SHIPPED | OUTPATIENT
Start: 2024-02-22 | End: 2024-03-27

## 2024-02-22 RX ORDER — ATORVASTATIN CALCIUM 40 MG/1
40 TABLET, FILM COATED ORAL DAILY
Qty: 45 TABLET | Refills: 0 | Status: SHIPPED | OUTPATIENT
Start: 2024-02-22 | End: 2024-03-27

## 2024-03-20 DIAGNOSIS — I10 ESSENTIAL HYPERTENSION WITH GOAL BLOOD PRESSURE LESS THAN 140/90: ICD-10-CM

## 2024-03-20 RX ORDER — HYDROCHLOROTHIAZIDE 25 MG/1
25 TABLET ORAL DAILY
Qty: 30 TABLET | Refills: 0 | Status: SHIPPED | OUTPATIENT
Start: 2024-03-20 | End: 2024-03-27

## 2024-03-25 ENCOUNTER — OFFICE VISIT (OUTPATIENT)
Dept: PHYSICAL MEDICINE AND REHAB | Facility: CLINIC | Age: 79
End: 2024-03-25
Payer: OTHER MISCELLANEOUS

## 2024-03-25 ENCOUNTER — HOSPITAL ENCOUNTER (OUTPATIENT)
Dept: RADIOLOGY | Facility: HOSPITAL | Age: 79
Discharge: HOME | End: 2024-03-25
Attending: NURSE PRACTITIONER | Admitting: NURSE PRACTITIONER
Payer: OTHER MISCELLANEOUS

## 2024-03-25 VITALS
SYSTOLIC BLOOD PRESSURE: 172 MMHG | DIASTOLIC BLOOD PRESSURE: 79 MMHG | WEIGHT: 152 LBS | HEART RATE: 68 BPM | HEIGHT: 63 IN | BODY MASS INDEX: 26.93 KG/M2

## 2024-03-25 DIAGNOSIS — M54.2 NECK PAIN: Primary | ICD-10-CM

## 2024-03-25 DIAGNOSIS — S09.90XA INJURY OF HEAD, INITIAL ENCOUNTER: ICD-10-CM

## 2024-03-25 DIAGNOSIS — M54.2 NECK PAIN: ICD-10-CM

## 2024-03-25 PROCEDURE — 72050 X-RAY EXAM NECK SPINE 4/5VWS: CPT

## 2024-03-25 PROCEDURE — 99204 OFFICE O/P NEW MOD 45 MIN: CPT | Performed by: NURSE PRACTITIONER

## 2024-03-25 RX ORDER — METHOCARBAMOL 500 MG/1
500 TABLET, FILM COATED ORAL 4 TIMES DAILY PRN
Qty: 40 TABLET | Refills: 0 | Status: SHIPPED | OUTPATIENT
Start: 2024-03-25 | End: 2024-05-03

## 2024-03-25 ASSESSMENT — PAIN SCALES - GENERAL: PAINLEVEL: SEVERE PAIN (7)

## 2024-03-25 NOTE — LETTER
March 25, 2024      Abi Lees  [address] [facility] [address]        To Whom It May Concern:    Abi Lees was seen in our clinic. She may return to work with the following restrictions: no lifting greater than 10 lbs. Restrictions effective until XR results available.      Sincerely,        Yoselin ROBERT-C  [facility]  O. [phone]

## 2024-03-25 NOTE — PROGRESS NOTES
ASSESSMENT: Abi Lees is a 78 year old female presents for consultation at the request of PCP Evelyn Yanes, who presents today for new patient evaluation of :     -  Neck pain    Patient is neurologically intact on exam. No myelopathic or red flag symptoms.    Given head injury/posterior neck injury and ongoing significant posterior neck pain with head turning radiating into left shoulder, recommended cervical spine x-rays.  She will monitor headaches for now and go to ER if they worsen or if she develops any red flag signs or symptoms. Would recommend a temporary weight lifting restriction of 10 pounds until results available.  If results negative for fracture or other concern, would likely recommend full course of physical therapy with follow-up after completion of PT.  I sent Robaxin to her pharmacy today and she will continue Aleve as needed and gabapentin.         3/20/2024     9:45 AM   OSWESTRY DISABILITY INDEX   Count 9    9   Sum 6    6   Oswestry Score (%) 13.33 %    13.33 %            Diagnoses and all orders for this visit:  Neck pain  -     methocarbamol (ROBAXIN) 500 MG tablet; Take 1 tablet (500 mg) by mouth 4 times daily as needed for muscle spasms  -     XR Cervical Spine G/E 4 Views; Future  Injury of head, initial encounter  -     XR Cervical Spine G/E 4 Views; Future       PLAN:  Reviewed spine anatomy and disease process. Discussed diagnosis and treatment options with the patient today. A shared decision making model was used. The patient's values and choices were respected. The following represents what was discussed and decided upon by the provider and the patient.     -DIAGNOSTIC TESTS:  Images were personally reviewed and interpreted and explained to patient today using spine model.   -- cervical spine x-rays, 4 views    -PHYSICAL THERAPY:    - will place orders for PT referral if x-rays are negative    -MEDICATIONS:    - start Robaxin  Continue Aleve  Lidocaine patches  over-the-counter  Continue gabapentin at current dosing, could consider increasing   Discussed multiple medication options today with patient. Discussed risks, side effects, and proper use of medications. Patient verbalized understanding.    -INTERVENTIONS:    - we did not discuss injections today.laura would be a good candidate in the future for either epidural steroid injections or medial branch blocks if indicated based on symptoms and supported by imaging results.    -PATIENT EDUCATION: Total time of 40 minutes, on the day of service, spent with the patient, reviewing the chart, placing orders, and documenting.   -Today we also discussed the issues related to the pros and cons of the current treatment plan.    -FOLLOW-UP:     We will call with x-ray results    Advised patient to call the [facility] Center if symptoms worsen or if they develop red flag symptoms such as numbness, weakness, severe pain uncontrolled by current pain med regimen, or any new or worsening problems controlling bladder and bowel function.   ______________________________________________________________________    SUBJECTIVE:   Abi Lees  is a 78 year old female on asa 81mg with hx of osteoporosis, neuropathy, htn, hyperlipidemia, varicose veins who presents today for new patient evaluation of neck pain  and headache.    Work injury on March 15.  She was picking up some boxes that fell off the shelf onto the floor, and additional boxes fell and hit the back of her neck and the back of her head.  They were of varying weights.  Since then, she has had posterior neck pain radiating into the left side of the neck and left shoulder which is worse with head turning to the left.  rates pain today as a 7 out of 10.   Describes the pain as grinding, sharp.    She endorses some mild bifrontal and posterior headaches at a 4-5 out of 10 as well.  Worse in the morning.   Describes as soreness.  This has not been worsening. No dizziness, changes in  vision, nausea vomiting.       She denies any arm pain, numbness, weakness, changes in bowel or bladder control or imbalance.  She has chronic right shoulder pain which she goes on a monthly basis to a chiropractor to have adjusted with good relief long-term.  This has not been worse since her injury.    She has been taking aleve on a daily basis and was started on gabapentin 300mg QID 1mo ago    She gets knee injections as well, last one appears to be oct    She is here with her  today     -Treatment to Date:     -Medications  aleve  Gabapentin     Current Outpatient Medications   Medication    gabapentin (NEURONTIN) 300 MG capsule    methocarbamol (ROBAXIN) 500 MG tablet    aspirin 81 MG tablet    atenolol (TENORMIN) 25 MG tablet    atorvastatin (LIPITOR) 40 MG tablet    Cholecalciferol (VITAMIN D-3 PO)    hydrochlorothiazide (HYDRODIURIL) 25 MG tablet    lisinopril (ZESTRIL) 40 MG tablet     Current Facility-Administered Medications   Medication    4 mL ropivacaine (NAROPIN) injection 5 mg/mL    betamethasone acet & sod phos (CELESTONE) injection 6 mg    lidocaine 1 % injection 2 mL    lidocaine 1 % injection 2 mL    lidocaine 1 % injection 2 mL    lidocaine 1 % injection 2 mL    triamcinolone (KENALOG-40) injection 40 mg    triamcinolone (KENALOG-40) injection 40 mg    triamcinolone (KENALOG-40) injection 40 mg    triamcinolone (KENALOG-40) injection 40 mg       No Known Allergies    Past Medical History:   Diagnosis Date    CAD in native artery     s/p stent placement in 2007 [facility]    HTN (hypertension), benign     Hyperlipidemia     Peripheral neuropathy     Varicose veins     bilateral with thrombophlebitis LLE        Patient Active Problem List   Diagnosis    Bilateral foot pain    Osteoarthritis    Neuropathy    Hyperlipidemia LDL goal <130    Colonoscopy refused    Care refused by patient    Advanced directives, counseling/discussion    Elevated antinuclear antibody (ANASTASIA) level    Livedo 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    Essential hypertension with goal blood pressure less than 140/90    Vitamin D deficiency    Varicose veins    Phlebitis of superficial vein of lower extremity    Displaced fracture of distal end of right radius    Coronary atherosclerosis       Past Surgical History:   Procedure Laterality Date    APPENDECTOMY      COLPORRHAPHY ANTERIOR, POSTERIOR, COMBINED N/A 3/25/2019    Procedure: Anterior and posterior colporrhaphy, vulvar condyloma removal.;  Surgeon: Bj Mackenzie MD;  Location: [facility] OR    HYSTERECTOMY      abdominal- bleeding    OOPHORECTOMY      unilateral    OTHER SURGICAL HISTORY      LLE Varicose vein RF ablation at [facility]        Family History   Problem Relation Age of Onset    Tremor Mother     Multiple Sclerosis Father        Reviewed past medical, surgical, and family history with patient found on new patient intake packet located in EMR Media tab.     SOCIAL HX: nonsmoker, no alcohol use, no heavy drinking, no rec drug use    Oswestry (TEX) Questionnaire:        3/20/2024     9:45 AM   OSWESTRY DISABILITY INDEX   Count 9    9   Sum 6    6   Oswestry Score (%) 13.33 %    13.33 %       Neck Disability Index:      3/20/2024     9:48 AM   Neck Disability Index (  Osvaldo H. and Nikki C. 1991. All rights reserved.; used with permission)   SECTION 1 - PAIN INTENSITY 1   SECTION 2 - PERSONAL CARE 0   SECTION 3 - LIFTING 4   SECTION 4 - READING 0   SECTION 5 - HEADACHES 1   SECTION 6 - CONCENTRATION 0   SECTION 7 - WORK 0   SECTION 8 - DRIVING 0   SECTION 9 - SLEEPING 1   Count 9    9   Sum 7    7   Raw Score: /50 7.78    7.78   Neck Disability Index Score: (%) 15.56 %    15.56 %          PHQ-2 Score:       3/25/2024    11:07 AM 5/16/2023     9:55 AM   PHQ-2 ( 1999 [employer])   Q1: Little interest or pleasure in doing things 0 0   Q2: Feeling down, depressed or hopeless 0 0   PHQ-2 Score 0 0          ROS:   Positive for headache, ringing in ears, changes in vision, feet and leg  "swelling, falls. Specifically negative for bowel/bladder dysfunction, balance changes, headache, dizziness, foot drop, fevers, chills, appetite changes, nausea/vomiting, unexplained weight loss. Otherwise 13 systems reviewed are negative. Please see the patient's intake questionnaire from today for details.    OBJECTIVE:  BP (!) 172/79   Pulse 68   Ht 5' 3\" (1.6 m)   Wt 152 lb (68.9 kg)   BMI 26.93 kg/m      PHYSICAL EXAMINATION:  --CONSTITUTIONAL: Vital signs as above. No acute distress. The patient is well nourished and well groomed.  --PSYCHIATRIC: The patient is awake, alert, oriented to person, place, and time, and answering questions appropriately with clear speech. Appropriate mood and affect   --HEENT: Sclera are non-injected. Extraocular muscles are intact. Moist oral mucosa.  --SKIN: Skin over the face, bilateral upper extremities, and posterior torso is clean, dry, intact without rashes.  --RESPIRATORY: Normal rhythm and effort. No abnormal accessory muscle breathing patterns noted.     --NEUROLOGIC: CN III-XII are grossly intact.   --GROSS MOTOR: Easily arises from a seated position. Toe walking, heel walking are normal. Some mild difficulty with tandem gait     --UPPER EXTREMITY MOTOR TESTING:  Shoulder abduction: right 5/5, left 5/5  Triceps: right 5/5 left 5/5  Biceps:right 5/5  left 5/5,   Hand :  right 5/5  left 5/5,   Intrinsics: right 5/5  left 5/5,   Extensors: right 5/5  left 5/5,     --LOWER EXTREMITY MOTOR TESTING  Hip flexion: right 5/5  left 5/5,   Quads:right 5/5  left 5/5,   Hamstrings: right 5/5  left 5/5,   Dorsiflexion: right 5/5  left 5/5,   Plantarflexion: right 5/5  left 5/5,   EHL: right 5/5  left 5/5,     REFLEXES: 2/4 symmetric triceps, biceps, brachioradialis bilaterally. 2/4 symmetric patellar, achilles reflex bilaterally.  Negative Clonus, Babinski, and Jack's bilaterally.      SENSATION: of the upper and lower extremities is intact to light touch.   --VASCULAR: Warm " upper and lower limbs bilaterally.     --CERVICAL SPINE: Inspection reveals no evidence of deformity or swelling. Range of motion is quite limited in left lateral rotation and left greater than right head tilting due to pain. No significant pain with cervical flexion, extension. No significant point tenderness to palpation of cervical spine.  tenderness to palpation of L trap. No tenderness to left scap or paraspinal musculature.    Reduced range of motion R shoulder due to pain      RESULTS:   Prior medical records from [facility]where were reviewed today.         IMAGING:  Spine imaging was personally reviewed and interpreted today. The images were shown to the patient and the findings were explained using a spine model.    EXAM: CT HEAD WO IV CONT, CT TRAUMA CERVICAL SCREEN T4-C1  LOCATION: [facility]  DATE/TIME: 1/20/2022 10:25 AM    INDICATION: Gcs 14 or 15 - head injury and age over 65y  COMPARISON: None.  TECHNIQUE:  1) Routine CT Head without IV contrast. Multiplanar reformats. Dose reduction techniques were used.  2) Routine CT Cervical Spine without IV contrast. Multiplanar reformats. Dose reduction techniques were used.    FINDINGS:  HEAD CT:  INTRACRANIAL CONTENTS: No intracranial hemorrhage. Incidentally noted mineralization of the basal ganglia bilaterally. Mild diffuse cerebral parenchymal volume loss. No midline shift. No cerebellar tonsillar ectopia. The basilar cisterns are patent. No CT evidence for an acute infarct. Mild periventricular and scattered foci of deep white matter hypodensities involving both cerebral hemispheres.    VISUALIZED ORBITS/SINUSES/MASTOIDS: No intraorbital abnormality. Mild mucosal thickening of the ethmoid air cells. No middle ear or mastoid effusion.    BONES/SOFT TISSUES: No acute abnormality.    CERVICAL SPINE CT:  VERTEBRA: Patient's head is tilted to the left. 1 mm retrolisthesis of C5 on C6. Craniocervical junction is within normal  limits. Vertebral body heights are maintained. No fractures.    CANAL/FORAMINA: Moderate intervertebral disc height loss at C5-C6 and C6-C7. Mild spinal canal narrowing at C5-C6 and C6-C7. Moderate right and mild left neuroforaminal narrowing at C3-C4. Mild left neuroforaminal narrowing at C4-C5. Moderate bilateral neuroforaminal narrowing at C5-C6. Moderate bilateral neuroforaminal narrowing at C6-C7.    PARASPINAL: No extraspinal abnormality. Visualized lung fields are clear.    IMPRESSION:  HEAD CT:  1.  No intracranial hemorrhage, mass lesions, hydrocephalus or CT evidence for an acute infarct.  2.  Mild diffuse cerebral parenchymal volume loss. Presumed chronic hypertensive/microvascular ischemic white matter changes.    CERVICAL SPINE CT:  1.  No CT evidence for acute fracture or post traumatic subluxation.    Exam End: 01/20/22 10:26 AM         Yoselin ROBERT-C  [facility]  O. [phone]

## 2024-03-25 NOTE — PATIENT INSTRUCTIONS
Imaging (Xray has been ordered today.   You can go to either location below to have these done as a walk-in:    [facility]  [address] [place], [place] [id number]    [facility] [place] [id number] [facility], [address]   [place] [address]    Robaxin 500mg (muscle relaxant medication) has been prescribed today. Please take this as directed up to 4x daily as needed. This medication may cause drowsiness. Please do not work or drive while taking this medication until you know how it affects you. If it does make you drowsy, you should only take it before bedtime or at times that you do not have to work/drive.     OK to continue aleve 1-2x daily as needed.  Continue gabapentin     We will consider PT referral based on XR results

## 2024-03-25 NOTE — LETTER
3/25/2024         RE: Abi Lees  [address]   [facility] [address]        Dear Colleague,    Thank you for referring your patient, Abi Lees, to the [facility]. Please see a copy of my visit note below.    ASSESSMENT: Abi Lees is a 78 year old female presents for consultation at the request of PCP Evelyn Yanes, who presents today for new patient evaluation of :     -  Neck pain    Patient is neurologically intact on exam. No myelopathic or red flag symptoms.    Given head injury/posterior neck injury and ongoing significant posterior neck pain with head turning radiating into left shoulder, recommended cervical spine x-rays.  She will monitor headaches for now and go to ER if they worsen or if she develops any red flag signs or symptoms. Would recommend a temporary weight lifting restriction of 10 pounds until results available.  If results negative for fracture or other concern, would likely recommend full course of physical therapy with follow-up after completion of PT.  I sent Robaxin to her pharmacy today and she will continue Aleve as needed and gabapentin.         3/20/2024     9:45 AM   OSWESTRY DISABILITY INDEX   Count 9    9   Sum 6    6   Oswestry Score (%) 13.33 %    13.33 %            Diagnoses and all orders for this visit:  Neck pain  -     methocarbamol (ROBAXIN) 500 MG tablet; Take 1 tablet (500 mg) by mouth 4 times daily as needed for muscle spasms  -     XR Cervical Spine G/E 4 Views; Future  Injury of head, initial encounter  -     XR Cervical Spine G/E 4 Views; Future       PLAN:  Reviewed spine anatomy and disease process. Discussed diagnosis and treatment options with the patient today. A shared decision making model was used. The patient's values and choices were respected. The following represents what was discussed and decided upon by the provider and the patient.     -DIAGNOSTIC TESTS:  Images were personally reviewed and interpreted and  explained to patient today using spine model.   -- cervical spine x-rays, 4 views    -PHYSICAL THERAPY:    - will place orders for PT referral if x-rays are negative    -MEDICATIONS:    - start Robaxin  Continue Aleve  Lidocaine patches over-the-counter  Continue gabapentin at current dosing, could consider increasing   Discussed multiple medication options today with patient. Discussed risks, side effects, and proper use of medications. Patient verbalized understanding.    -INTERVENTIONS:    - we did not discuss injections today.atient would be a good candidate in the future for either epidural steroid injections or medial branch blocks if indicated based on symptoms and supported by imaging results.    -PATIENT EDUCATION: Total time of 40 minutes, on the day of service, spent with the patient, reviewing the chart, placing orders, and documenting.   -Today we also discussed the issues related to the pros and cons of the current treatment plan.    -FOLLOW-UP:     We will call with x-ray results    Advised patient to call the [facility] if symptoms worsen or if they develop red flag symptoms such as numbness, weakness, severe pain uncontrolled by current pain med regimen, or any new or worsening problems controlling bladder and bowel function.   ______________________________________________________________________    SUBJECTIVE:   Abi Lees  is a 78 year old female on asa 81mg with hx of osteoporosis, neuropathy, htn, hyperlipidemia, varicose veins who presents today for new patient evaluation of neck pain  and headache.    Work injury on March 15.  She was picking up some boxes that fell off the shelf onto the floor, and additional boxes fell and hit the back of her neck and the back of her head.  They were of varying weights.  Since then, she has had posterior neck pain radiating into the left side of the neck and left shoulder which is worse with head turning to the left.  rates pain today as a 7 out of 10.    Describes the pain as grinding, sharp.    She endorses some mild bifrontal and posterior headaches at a 4-5 out of 10 as well.  Worse in the morning.   Describes as soreness.  This has not been worsening. No dizziness, changes in vision, nausea vomiting.       She denies any arm pain, numbness, weakness, changes in bowel or bladder control or imbalance.  She has chronic right shoulder pain which she goes on a monthly basis to a chiropractor to have adjusted with good relief long-term.  This has not been worse since her injury.    She has been taking aleve on a daily basis and was started on gabapentin 300mg QID 1mo ago    She gets knee injections as well, last one appears to be oct    She is here with her  today     -Treatment to Date:     -Medications  aleve  Gabapentin     Current Outpatient Medications   Medication     gabapentin (NEURONTIN) 300 MG capsule     methocarbamol (ROBAXIN) 500 MG tablet     aspirin 81 MG tablet     atenolol (TENORMIN) 25 MG tablet     atorvastatin (LIPITOR) 40 MG tablet     Cholecalciferol (VITAMIN D-3 PO)     hydrochlorothiazide (HYDRODIURIL) 25 MG tablet     lisinopril (ZESTRIL) 40 MG tablet     Current Facility-Administered Medications   Medication     4 mL ropivacaine (NAROPIN) injection 5 mg/mL     betamethasone acet & sod phos (CELESTONE) injection 6 mg     lidocaine 1 % injection 2 mL     lidocaine 1 % injection 2 mL     lidocaine 1 % injection 2 mL     lidocaine 1 % injection 2 mL     triamcinolone (KENALOG-40) injection 40 mg     triamcinolone (KENALOG-40) injection 40 mg     triamcinolone (KENALOG-40) injection 40 mg     triamcinolone (KENALOG-40) injection 40 mg       No Known Allergies    Past Medical History:   Diagnosis Date     CAD in native artery     s/p stent placement in 2007 [facility]     HTN (hypertension), benign      Hyperlipidemia      Peripheral neuropathy      Varicose veins     bilateral with thrombophlebitis LLE        Patient Active Problem List    Diagnosis     Bilateral foot pain     Osteoarthritis     Neuropathy     Hyperlipidemia LDL goal <130     Colonoscopy refused     Care refused by patient     Advanced directives, counseling/discussion     Elevated antinuclear antibody (ANASTASIA) level     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     Essential hypertension with goal blood pressure less than 140/90     Vitamin D deficiency     Varicose veins     Phlebitis of superficial vein of lower extremity     Displaced fracture of distal end of right radius     Coronary atherosclerosis       Past Surgical History:   Procedure Laterality Date     APPENDECTOMY       COLPORRHAPHY ANTERIOR, POSTERIOR, COMBINED N/A 3/25/2019    Procedure: Anterior and posterior colporrhaphy, vulvar condyloma removal.;  Surgeon: Bj Mackenzie MD;  Location: [facility] OR     HYSTERECTOMY      abdominal- bleeding     OOPHORECTOMY      unilateral     OTHER SURGICAL HISTORY      LLE Varicose vein RF ablation at [facility]        Family History   Problem Relation Age of Onset     Tremor Mother      Multiple Sclerosis Father        Reviewed past medical, surgical, and family history with patient found on new patient intake packet located in EMR Media tab.     SOCIAL HX: nonsmoker, no alcohol use, no heavy drinking, no rec drug use    Oswestry (TEX) Questionnaire:        3/20/2024     9:45 AM   OSWESTRY DISABILITY INDEX   Count 9    9   Sum 6    6   Oswestry Score (%) 13.33 %    13.33 %       Neck Disability Index:      3/20/2024     9:48 AM   Neck Disability Index (  Osvaldo H. and Nikki C. 1991. All rights reserved.; used with permission)   SECTION 1 - PAIN INTENSITY 1   SECTION 2 - PERSONAL CARE 0   SECTION 3 - LIFTING 4   SECTION 4 - READING 0   SECTION 5 - HEADACHES 1   SECTION 6 - CONCENTRATION 0   SECTION 7 - WORK 0   SECTION 8 - DRIVING 0   SECTION 9 - SLEEPING 1   Count 9    9   Sum 7    7   Raw Score: /50 7.78    7.78   Neck Disability Index Score: (%) 15.56 %    15.56 %          PHQ-2 Score:  "      3/25/2024    11:07 AM 5/16/2023     9:55 AM   PHQ-2 ( 1999 [employer])   Q1: Little interest or pleasure in doing things 0 0   Q2: Feeling down, depressed or hopeless 0 0   PHQ-2 Score 0 0          ROS:   Positive for headache, ringing in ears, changes in vision, feet and leg swelling, falls. Specifically negative for bowel/bladder dysfunction, balance changes, headache, dizziness, foot drop, fevers, chills, appetite changes, nausea/vomiting, unexplained weight loss. Otherwise 13 systems reviewed are negative. Please see the patient's intake questionnaire from today for details.    OBJECTIVE:  BP (!) 172/79   Pulse 68   Ht 5' 3\" (1.6 m)   Wt 152 lb (68.9 kg)   BMI 26.93 kg/m      PHYSICAL EXAMINATION:  --CONSTITUTIONAL: Vital signs as above. No acute distress. The patient is well nourished and well groomed.  --PSYCHIATRIC: The patient is awake, alert, oriented to person, place, and time, and answering questions appropriately with clear speech. Appropriate mood and affect   --HEENT: Sclera are non-injected. Extraocular muscles are intact. Moist oral mucosa.  --SKIN: Skin over the face, bilateral upper extremities, and posterior torso is clean, dry, intact without rashes.  --RESPIRATORY: Normal rhythm and effort. No abnormal accessory muscle breathing patterns noted.     --NEUROLOGIC: CN III-XII are grossly intact.   --GROSS MOTOR: Easily arises from a seated position. Toe walking, heel walking are normal. Some mild difficulty with tandem gait     --UPPER EXTREMITY MOTOR TESTING:  Shoulder abduction: right 5/5, left 5/5  Triceps: right 5/5 left 5/5  Biceps:right 5/5  left 5/5,   Hand :  right 5/5  left 5/5,   Intrinsics: right 5/5  left 5/5,   Extensors: right 5/5  left 5/5,     --LOWER EXTREMITY MOTOR TESTING  Hip flexion: right 5/5  left 5/5,   Quads:right 5/5  left 5/5,   Hamstrings: right 5/5  left 5/5,   Dorsiflexion: right 5/5  left 5/5,   Plantarflexion: right 5/5  left 5/5,   EHL: right 5/5  left " 5/5,     REFLEXES: 2/4 symmetric triceps, biceps, brachioradialis bilaterally. 2/4 symmetric patellar, achilles reflex bilaterally.  Negative Clonus, Babinski, and Jack's bilaterally.      SENSATION: of the upper and lower extremities is intact to light touch.   --VASCULAR: Warm upper and lower limbs bilaterally.     --CERVICAL SPINE: Inspection reveals no evidence of deformity or swelling. Range of motion is quite limited in left lateral rotation and left greater than right head tilting due to pain. No significant pain with cervical flexion, extension. No significant point tenderness to palpation of cervical spine.  tenderness to palpation of L trap. No tenderness to left scap or paraspinal musculature.    Reduced range of motion R shoulder due to pain      RESULTS:   Prior medical records from [facility] and [facility]where were reviewed today.         IMAGING:  Spine imaging was personally reviewed and interpreted today. The images were shown to the patient and the findings were explained using a spine model.    EXAM: CT HEAD WO IV CONT, CT TRAUMA CERVICAL SCREEN T4-C1  LOCATION[facility]  DATE/TIME: 1/20/2022 10:25 AM    INDICATION: Gcs 14 or 15 - head injury and age over 65y  COMPARISON: None.  TECHNIQUE:  1) Routine CT Head without IV contrast. Multiplanar reformats. Dose reduction techniques were used.  2) Routine CT Cervical Spine without IV contrast. Multiplanar reformats. Dose reduction techniques were used.    FINDINGS:  HEAD CT:  INTRACRANIAL CONTENTS: No intracranial hemorrhage. Incidentally noted mineralization of the basal ganglia bilaterally. Mild diffuse cerebral parenchymal volume loss. No midline shift. No cerebellar tonsillar ectopia. The basilar cisterns are patent. No CT evidence for an acute infarct. Mild periventricular and scattered foci of deep white matter hypodensities involving both cerebral hemispheres.    VISUALIZED ORBITS/SINUSES/MASTOIDS: No intraorbital  abnormality. Mild mucosal thickening of the ethmoid air cells. No middle ear or mastoid effusion.    BONES/SOFT TISSUES: No acute abnormality.    CERVICAL SPINE CT:  VERTEBRA: Patient's head is tilted to the left. 1 mm retrolisthesis of C5 on C6. Craniocervical junction is within normal limits. Vertebral body heights are maintained. No fractures.    CANAL/FORAMINA: Moderate intervertebral disc height loss at C5-C6 and C6-C7. Mild spinal canal narrowing at C5-C6 and C6-C7. Moderate right and mild left neuroforaminal narrowing at C3-C4. Mild left neuroforaminal narrowing at C4-C5. Moderate bilateral neuroforaminal narrowing at C5-C6. Moderate bilateral neuroforaminal narrowing at C6-C7.    PARASPINAL: No extraspinal abnormality. Visualized lung fields are clear.    IMPRESSION:  HEAD CT:  1.  No intracranial hemorrhage, mass lesions, hydrocephalus or CT evidence for an acute infarct.  2.  Mild diffuse cerebral parenchymal volume loss. Presumed chronic hypertensive/microvascular ischemic white matter changes.    CERVICAL SPINE CT:  1.  No CT evidence for acute fracture or post traumatic subluxation.    Exam End: 01/20/22 10:26 AM         Yoselin ROBERT-C  [facility]  O. [phone]      Again, thank you for allowing me to participate in the care of your patient.        Sincerely,        EMORY Dunlap CNP

## 2024-03-26 ENCOUNTER — TELEPHONE (OUTPATIENT)
Dept: PHYSICAL MEDICINE AND REHAB | Facility: CLINIC | Age: 79
End: 2024-03-26
Payer: COMMERCIAL

## 2024-03-26 NOTE — TELEPHONE ENCOUNTER
----- Message from EMORY Casillas CNP sent at 3/26/2024  9:48 AM CDT -----  Please let Abi know that her cervical XR were negative for fracture. They do show some chronic degenerative wear and tear changes in the joints and very small chronic appearing shifts in vertebral body alignment. I suspect her neck pain is due to flare up of inflammation in the joints. Continue the aleve, robaxin and would recommend a PT referral. I do not see a radiographic reason necessitating ongoing lifting restrictions, unless she finds that it exacerbates her pain. Recommend follow up with me in approx 6 weeks to discuss how things are going with PT

## 2024-03-26 NOTE — TELEPHONE ENCOUNTER
Call placed to patient with provider's results and recommendations.  Pt stated understanding. Pt declined physical therapy referral at this time. She will call back if she changes her mind.

## 2024-03-27 ENCOUNTER — OFFICE VISIT (OUTPATIENT)
Dept: FAMILY MEDICINE | Facility: CLINIC | Age: 79
End: 2024-03-27
Payer: COMMERCIAL

## 2024-03-27 VITALS
BODY MASS INDEX: 27.54 KG/M2 | SYSTOLIC BLOOD PRESSURE: 138 MMHG | DIASTOLIC BLOOD PRESSURE: 78 MMHG | HEIGHT: 63 IN | OXYGEN SATURATION: 98 % | TEMPERATURE: 98 F | RESPIRATION RATE: 18 BRPM | WEIGHT: 155.4 LBS | HEART RATE: 74 BPM

## 2024-03-27 DIAGNOSIS — Z00.00 ENCOUNTER FOR MEDICARE ANNUAL WELLNESS EXAM: Primary | ICD-10-CM

## 2024-03-27 DIAGNOSIS — R06.02 SOB (SHORTNESS OF BREATH): ICD-10-CM

## 2024-03-27 DIAGNOSIS — G62.9 NEUROPATHY: ICD-10-CM

## 2024-03-27 DIAGNOSIS — B37.2 CANDIDAL INTERTRIGO: ICD-10-CM

## 2024-03-27 DIAGNOSIS — E78.5 HYPERLIPIDEMIA LDL GOAL <130: ICD-10-CM

## 2024-03-27 DIAGNOSIS — I10 ESSENTIAL HYPERTENSION WITH GOAL BLOOD PRESSURE LESS THAN 140/90: ICD-10-CM

## 2024-03-27 DIAGNOSIS — I25.10 ATHEROSCLEROSIS OF CORONARY ARTERY OF NATIVE HEART WITHOUT ANGINA PECTORIS, UNSPECIFIED VESSEL OR LESION TYPE: ICD-10-CM

## 2024-03-27 LAB
ANION GAP SERPL CALCULATED.3IONS-SCNC: 12 MMOL/L (ref 7–15)
BUN SERPL-MCNC: 29.2 MG/DL (ref 8–23)
CALCIUM SERPL-MCNC: 9.8 MG/DL (ref 8.8–10.2)
CHLORIDE SERPL-SCNC: 103 MMOL/L (ref 98–107)
CHOLEST SERPL-MCNC: 174 MG/DL
CREAT SERPL-MCNC: 0.98 MG/DL (ref 0.51–0.95)
CREAT UR-MCNC: 79.7 MG/DL
DEPRECATED HCO3 PLAS-SCNC: 26 MMOL/L (ref 22–29)
EGFRCR SERPLBLD CKD-EPI 2021: 59 ML/MIN/1.73M2
FASTING STATUS PATIENT QL REPORTED: YES
GLUCOSE SERPL-MCNC: 108 MG/DL (ref 70–99)
HDLC SERPL-MCNC: 43 MG/DL
LDLC SERPL CALC-MCNC: 102 MG/DL
MICROALBUMIN UR-MCNC: <12 MG/L
MICROALBUMIN/CREAT UR: NORMAL MG/G{CREAT}
NONHDLC SERPL-MCNC: 131 MG/DL
POTASSIUM SERPL-SCNC: 4.5 MMOL/L (ref 3.4–5.3)
SODIUM SERPL-SCNC: 141 MMOL/L (ref 135–145)
TRIGL SERPL-MCNC: 145 MG/DL

## 2024-03-27 PROCEDURE — 36415 COLL VENOUS BLD VENIPUNCTURE: CPT | Performed by: NURSE PRACTITIONER

## 2024-03-27 PROCEDURE — 80061 LIPID PANEL: CPT | Performed by: NURSE PRACTITIONER

## 2024-03-27 PROCEDURE — 99214 OFFICE O/P EST MOD 30 MIN: CPT | Mod: 25 | Performed by: NURSE PRACTITIONER

## 2024-03-27 PROCEDURE — 82570 ASSAY OF URINE CREATININE: CPT | Performed by: NURSE PRACTITIONER

## 2024-03-27 PROCEDURE — 82043 UR ALBUMIN QUANTITATIVE: CPT | Performed by: NURSE PRACTITIONER

## 2024-03-27 PROCEDURE — 80048 BASIC METABOLIC PNL TOTAL CA: CPT | Performed by: NURSE PRACTITIONER

## 2024-03-27 PROCEDURE — G0439 PPPS, SUBSEQ VISIT: HCPCS | Performed by: NURSE PRACTITIONER

## 2024-03-27 RX ORDER — ATORVASTATIN CALCIUM 40 MG/1
40 TABLET, FILM COATED ORAL DAILY
Qty: 90 TABLET | Refills: 3 | Status: SHIPPED | OUTPATIENT
Start: 2024-03-27 | End: 2024-05-03

## 2024-03-27 RX ORDER — GABAPENTIN 300 MG/1
CAPSULE ORAL
Qty: 450 CAPSULE | Refills: 3 | Status: SHIPPED | OUTPATIENT
Start: 2024-03-27

## 2024-03-27 RX ORDER — HYDROCHLOROTHIAZIDE 25 MG/1
25 TABLET ORAL DAILY
Qty: 90 TABLET | Refills: 3 | Status: SHIPPED | OUTPATIENT
Start: 2024-03-27 | End: 2024-07-22

## 2024-03-27 RX ORDER — LISINOPRIL 40 MG/1
40 TABLET ORAL DAILY
Qty: 90 TABLET | Refills: 3 | Status: SHIPPED | OUTPATIENT
Start: 2024-03-27 | End: 2024-07-22

## 2024-03-27 RX ORDER — NYSTATIN 100000 U/G
CREAM TOPICAL
Qty: 30 G | Refills: 1 | Status: SHIPPED | OUTPATIENT
Start: 2024-03-27 | End: 2024-06-04

## 2024-03-27 RX ORDER — ATENOLOL 25 MG/1
25 TABLET ORAL DAILY
Qty: 90 TABLET | Refills: 3 | Status: SHIPPED | OUTPATIENT
Start: 2024-03-27 | End: 2024-05-03

## 2024-03-27 RX ORDER — RESPIRATORY SYNCYTIAL VIRUS VACCINE 120MCG/0.5
0.5 KIT INTRAMUSCULAR ONCE
Qty: 1 EACH | Refills: 0 | Status: CANCELLED | OUTPATIENT
Start: 2024-03-27 | End: 2024-03-27

## 2024-03-27 ASSESSMENT — PAIN SCALES - GENERAL: PAINLEVEL: NO PAIN (0)

## 2024-03-27 NOTE — COMMUNITY RESOURCES LIST (ENGLISH)
March 27, 2024           YOUR PERSONALIZED LIST OF SERVICES & PROGRAMS           & RECREATION    [employer] - Sports clubs and recreational activities  [address] [place] [address] (Distance: 5.1 miles)  Phone: [phone]  Website: [email or web address] [email or web address] [email or web address]  Language: English  Fee: Self pay      [facility] - Adult Enrichment  Phone: [phone]) [phone]  Website: [email or web address]://[employer]/adults-seniors/adult-enrichment/  Language: English  Hours: Mon 7:30 AM - 4:00 PM Tue 7:30 AM - 4:00 PM Wed 7:30 AM - 4:00 PM Thu 7:30 AM - 4:00 PM Fri 7:30 AM - 4:00 PM      [employer] LITT[employer] BASEB[employer] AND SOFTBALL  Website: http://www.littleleague.org    Classes/Groups      [employer] - Elders Respite [facility] and Elders Day Services  [address] [place] [address] (Distance: 16.0 miles)  Phone: [phone]) [phone]  Website: https://www.[employer]ok[employer]/ADS.html  Language: English  Fee: Free  Accessibility: Ada accessible, Translation services  Transportation Options: Free transportation      [employer] ([employer]) - [facility]  [address] [place] [address] (Distance: 16.0 miles)  Phone: [phone]) [phone]  Website: https://www.[employer]/TaiChi.html  Language: English  Fee: Free  Accessibility: [place] accessible, Translation services  Transportation Options: Free transportation      [facility] - Adult Enrichment  Phone: [phone]) [phone]  Website: [email or web address]://[employer]/adults-seniors/adult-enrichment/  Language: English  Hours: Mon 7:30 AM - 4:00 PM Tue 7:30 AM - 4:00 PM Wed 7:30 AM - 4:00 PM Thu 7:30 AM - 4:00 PM Fri 7:30 AM - 4:00 PM               IMPORTANT NUMBERS & WEBSITES        Emergency Services  911  .   [facility]  211 [email or web address]  .   Poison Control  (853) 993-8069 http://mnpoison.org http://[email or web address].org  .     Suicide and Crisis Lifeline  867  http://988lifeline.org  .   Childhelp [place] Child Abuse Hotline  515.235.1370 http://Childhelphotline.org   .   National Sexual Assault Hotline  (460) 830-9194 (HOPE) http://Rainn.org   .     National Runaway Safeline  (156) 144-2489 (RUNAWAY) http://[employer].SkuRun  .   Pregnancy & Postpartum Support  Call/text 580-280-0942  [place]: http://ppsupportmn.org  [place]: http://psichapters.com/wi  .   Substance Abuse National Helpline ([facility])  426-240-HELP (5155) http://Findtreatment.gov   .                DISCLAIMER: [employer] does not endorse any service providers mentioned in this resource list. [employer] does not guarantee that the services mentioned in this resource list will be available to you or will improve your health or wellness.    [facility]

## 2024-03-27 NOTE — PATIENT INSTRUCTIONS
Increase your gabapentin to 2 caps at night time but continue 1 cap during the day    Please get us a copy of your health care directive.     Preventive Care Advice   This is general advice given by our system to help you stay healthy. However, your care team may have specific advice just for you. Please talk to your care team about your preventive care needs.  Nutrition  Eat 5 or more servings of fruits and vegetables each day.  Try wheat bread, brown rice and whole grain pasta (instead of white bread, rice, and pasta).  Get enough calcium and vitamin D. Check the label on foods and aim for 100% of the RDA (recommended daily allowance).  Lifestyle  Exercise at least 150 minutes each week   (30 minutes a day, 5 days a week).  Do muscle strengthening activities 2 days a week. These help control your weight and prevent disease.  No smoking.  Wear sunscreen to prevent skin cancer.  Have a dental exam and cleaning every 6 months.  Yearly exams  See your health care team every year to talk about:  Any changes in your health.  Any medicines your care team has prescribed.  Preventive care, family planning, and ways to prevent chronic diseases.  Shots (vaccines)   HPV shots (up to age 26), if you've never had them before.  Hepatitis B shots (up to age 59), if you've never had them before.  COVID-19 shot: Get this shot when it's due.  Flu shot: Get a flu shot every year.  Tetanus shot: Get a tetanus shot every 10 years.  Pneumococcal, hepatitis A, and RSV shots: Ask your care team if you need these based on your risk.  Shingles shot (for age 50 and up).  General health tests  Diabetes screening:  Starting at age 35, Get screened for diabetes at least every 3 years.  If you are younger than age 35, ask your care team if you should be screened for diabetes.  Cholesterol test: At age 39, start having a cholesterol test every 5 years, or more often if advised.  Bone density scan (DEXA): At age 50, ask your care team if you  should have this scan for osteoporosis (brittle bones).  Hepatitis C: Get tested at least once in your life.  STIs (sexually transmitted infections)  Before age 24: Ask your care team if you should be screened for STIs.  After age 24: Get screened for STIs if you're at risk. You are at risk for STIs (including HIV) if:  You are sexually active with more than one person.  You don't use condoms every time.  You or a partner was diagnosed with a sexually transmitted infection.  If you are at risk for HIV, ask about PrEP medicine to prevent HIV.  Get tested for HIV at least once in your life, whether you are at risk for HIV or not.  Cancer screening tests  Cervical cancer screening: If you have a cervix, begin getting regular cervical cancer screening tests at age 21. Most people who have regular screenings with normal results can stop after age 65. Talk about this with your provider.  Breast cancer scan (mammogram): If you've ever had breasts, begin having regular mammograms starting at age 40. This is a scan to check for breast cancer.  Colon cancer screening: It is important to start screening for colon cancer at age 45.  Have a colonoscopy test every 10 years (or more often if you're at risk) Or, ask your provider about stool tests like a FIT test every year or Cologuard test every 3 years.  To learn more about your testing options, visit: https://www.[email or web address]/638172.pdf.  For help making a decision, visit: https://bit.ly/ny60799.  Prostate cancer screening test: If you have a prostate and are age 55 to 69, ask your provider if you would benefit from a yearly prostate cancer screening test.  Lung cancer screening: If you are a current or former smoker age 50 to 80, ask your care team if ongoing lung cancer screenings are right for you.  For informational purposes only. Not to replace the advice of your health care provider. Copyright   2023 [employer]. All rights reserved. Clinically reviewed by the   [employer] [place] [facility]. [employer] 025286 - REV 01/24.    Learning About Sleeping Well  What does sleeping well mean?     Sleeping well means getting enough sleep to feel good and stay healthy. How much sleep is enough varies among people.  The number of hours you sleep and how you feel when you wake up are both important. If you do not feel refreshed, you probably need more sleep. Another sign of not getting enough sleep is feeling tired during the day.  Experts recommend that adults get at least 7 or more hours of sleep per day. Children and older adults need more sleep.  Why is getting enough sleep important?  Getting enough quality sleep is a basic part of good health. When your sleep suffers, your physical health, mood, and your thoughts can suffer too. You may find yourself feeling more grumpy or stressed. Not getting enough sleep also can lead to serious problems, including injury, accidents, anxiety, and depression.  What might cause poor sleeping?  Many things can cause sleep problems, including:  Changes to your sleep schedule.  Stress. Stress can be caused by fear about a single event, such as giving a speech. Or you may have ongoing stress, such as worry about work or school.  Depression, anxiety, and other mental or emotional conditions.  Changes in your sleep habits or surroundings. This includes changes that happen where you sleep, such as noise, light, or sleeping in a different bed. It also includes changes in your sleep pattern, such as having jet lag or working a late shift.  Health problems, such as pain, breathing problems, and restless legs syndrome.  Lack of regular exercise.  Using alcohol, nicotine, or caffeine before bed.  How can you help yourself?  Here are some tips that may help you sleep more soundly and wake up feeling more refreshed.  Your sleeping area   Use your bedroom only for sleeping and sex. A bit of light reading may help you fall asleep. But if it doesn't, do  "your reading elsewhere in the house. Try not to use your TV, computer, smartphone, or tablet while you are in bed.  Be sure your bed is big enough to stretch out comfortably, especially if you have a sleep partner.  Keep your bedroom quiet, dark, and cool. Use curtains, blinds, or a sleep mask to block out light. To block out noise, use earplugs, soothing music, or a \"white noise\" machine.  Your evening and bedtime routine   Create a relaxing bedtime routine. You might want to take a warm shower or bath, or listen to soothing music.  Go to bed at the same time every night. And get up at the same time every morning, even if you feel tired.  What to avoid   Limit caffeine (coffee, tea, caffeinated sodas) during the day, and don't have any for at least 6 hours before bedtime.  Avoid drinking alcohol before bedtime. Alcohol can cause you to wake up more often during the night.  Try not to smoke or use tobacco, especially in the evening. Nicotine can keep you awake.  Limit naps during the day, especially close to bedtime.  Avoid lying in bed awake for too long. If you can't fall asleep or if you wake up in the middle of the night and can't get back to sleep within about 20 minutes, get out of bed and go to another room until you feel sleepy.  Avoid taking medicine right before bed that may keep you awake or make you feel hyper or energized. Your doctor can tell you if your medicine may do this and if you can take it earlier in the day.  If you can't sleep   Imagine yourself in a peaceful, pleasant scene. Focus on the details and feelings of being in a place that is relaxing.  Get up and do a quiet or boring activity until you feel sleepy.  Avoid drinking any liquids before going to bed to help prevent waking up often to use the bathroom.  Where can you learn more?  Go to https://www.healthwise.net/patiented  Enter J942 in the search box to learn more about \"Learning About Sleeping Well.\"  Current as of: July 10, 2023     "           Content Version: 14.0    3639-9607 [employer].   Care instructions adapted under license by your healthcare professional. If you have questions about a medical condition or this instruction, always ask your healthcare professional. [employer] disclaims any warranty or liability for your use of this information.

## 2024-03-27 NOTE — PROGRESS NOTES
Assessment & Plan     Atherosclerosis of coronary artery of native heart without angina pectoris, unspecified vessel or lesion type  Will recheck lipids here today.  Has not seen cardiology in some time.  Increasing shortness of breath. Will obtain stress test.  Education given regarding warning signs to watch for and when would need to seek immediate medical attention.  Full plan will depend on outcome.  - NM Lexiscan stress test; Future  - Lipid panel reflex to direct LDL Fasting; Future  - Lipid panel reflex to direct LDL Fasting    Encounter for Medicare annual wellness exam  Screening guidelines reviewed.   Declines all immunizations  Declines bone density test.    Neuropathy  Worsening symptoms at night.  Plan to continue gabapentin 300 mg 3 times per day, increase bedtime dose to 2 capsules.  Updated prescription sent.  Plan to follow-up in 1 year  - gabapentin (NEURONTIN) 300 MG capsule; Take 1 capsule 3 times per day, take 2 capsules at bedtime    Essential hypertension with goal blood pressure less than 140/90  sTable-blood pressure well-controlled today in clinic.  Will update labs.  Refills sent.  Follow-up in 1 year  - atenolol (TENORMIN) 25 MG tablet; Take 1 tablet (25 mg) by mouth daily  - hydrochlorothiazide (HYDRODIURIL) 25 MG tablet; Take 1 tablet (25 mg) by mouth daily  - lisinopril (ZESTRIL) 40 MG tablet; Take 1 tablet (40 mg) by mouth daily  - Albumin Random Urine Quantitative with Creat Ratio; Future  - Basic metabolic panel; Future  - Albumin Random Urine Quantitative with Creat Ratio  - Basic metabolic panel    Hyperlipidemia LDL goal <130  Previous lipids reviewed.  Tolerating atorvastatin well.  Will update labs.  Refill sent.  Follow-up in 1 year  - atorvastatin (LIPITOR) 40 MG tablet; Take 1 tablet (40 mg) by mouth daily  - NM Lexiscan stress test; Future  - Lipid panel reflex to direct LDL Fasting; Future  - Lipid panel reflex to direct LDL Fasting    SOB (shortness of  "breath)  Concerned due to previous history of coronary artery disease.  Will arrange for stress test.  Education given regarding warning signs to watch for and when would need to seek immediate medical attention.  - NM Lexiscan stress test; Future    Candidal intertrigo  Education given on use.  Encouraged to keep area clean and dry.  - nystatin (MYCOSTATIN) 349984 UNIT/GM external cream; Apply to affected area twice per day as needed.    Review of the result(s) of each unique test - Labs, imaging, cardiac testing  Ordering of each unique test  Prescription drug management      BMI  Estimated body mass index is 27.53 kg/m  as calculated from the following:    Height as of this encounter: 1.6 m (5' 3\").    Weight as of this encounter: 70.5 kg (155 lb 6.4 oz).     Adrianne Alex is a 78 year old, presenting for the following health issues:  Recheck Medication        3/27/2024     7:33 AM   Additional Questions   Roomed by anish         3/27/2024     7:33 AM   Patient Reported Additional Medications   Patient reports taking the following new medications none     History of Present Illness       Reason for visit:  Prescription refills.    She eats 4 or more servings of fruits and vegetables daily.She consumes 0 sweetened beverage(s) daily.She exercises with enough effort to increase her heart rate 30 to 60 minutes per day.  She exercises with enough effort to increase her heart rate 4 days per week.   She is taking medications regularly.       Hyperlipidemia Follow-Up    Are you regularly taking any medication or supplement to lower your cholesterol?   Yes- daily  Are you having muscle aches or other side effects that you think could be caused by your cholesterol lowering medication?  No    Hypertension Follow-up    Do you check your blood pressure regularly outside of the clinic? No   Are you following a low salt diet? No  Are your blood pressures ever more than 140 on the top number (systolic) OR more   than 90 on " the bottom number (diastolic), for example 140/90? No    Annual Wellness Visit     Patient has been advised of split billing requirements and indicates understanding: Yes          Health Care Directive  Patient does not have a Health Care Directive or Living Will: Patient states has Advance Directive and will bring in a copy to clinic.  In general, how would you rate your overall physical health? good  Do you have a special diet?  Regular (no restrictions)        3/27/2024   Exercise, Social Connection, Stress   Days per week of moderate/strenous exercise 0 days   Average minutes spent exercising at this level 0 min   Frequency of gathering with friends or relatives Once   Feel stress (tense, anxious, or unable to sleep) Only a littl     Do you see a dentist two times every year?  (!) NO  The patient was instructed to see the dentist every 6 months.   Have you been more tired than usual lately?  (!) YES   Discussed possible causes of fatigue.   If you drink alcohol do you typically have >3 drinks per day or >7 drinks per week? No  Do you have a current opioid prescription? No  Do you use any other controlled substances or medications that are not prescribed by a provider? None  Social History     Tobacco Use    Smoking status: Former     Packs/day: .5     Types: Cigarettes     Quit date: 2007     Years since quittin.7    Smokeless tobacco: Never   Vaping Use    Vaping Use: Never used   Substance Use Topics    Alcohol use: Yes     Alcohol/week: 0.0 standard drinks of alcohol     Comment: rare    Drug use: No       Needs assistance for the following daily activities: no assistance needed  Which of the following safety concerns are present in your home?  none identified   Do you (or your family members) have any concerns about your safety while driving?  No  Do you have any of the following hearing concerns?: No hearing concerns  In the past 6 months, have you been bothered by leaking of urine? No             3/20/2024   Fall Risk   Fallen 2 or more times in the past year? No   Trouble with walking or balance? No          Today's PHQ-2 Score:       3/27/2024     7:17 AM   PHQ-2 ( 1999 Pfizer)   Q1: Little interest or pleasure in doing things 0   Q2: Feeling down, depressed or hopeless 0   PHQ-2 Score 0   Q1: Little interest or pleasure in doing things Not at all   Q2: Feeling down, depressed or hopeless Not at all   PHQ-2 Score 0           7/22/2021   LAST FHS-7 RESULTS   1st degree relative breast or ovarian cancer No   Any relative bilateral breast cancer No   Any male have breast cancer No   Any ONE woman have BOTH breast AND ovarian cancer No   Any woman with breast cancer before 50yrs No   2 or more relatives with breast AND/OR ovarian cancer No   2 or more relatives with breast AND/OR bowel cancer No              History of abnormal Pap smear:        ASCVD Risk   The 10-year ASCVD risk score (Virgen CANTU, et al., 2019) is: 28.8%    Values used to calculate the score:      Age: 78 years      Sex: Female      Is Non- : No      Diabetic: No      Tobacco smoker: No      Systolic Blood Pressure: 138 mmHg      Is BP treated: Yes      HDL Cholesterol: 49 mg/dL      Total Cholesterol: 158 mg/dL          Reviewed and updated as needed this visit by Provider                      Current providers sharing in care for this patient include:  Patient Care Team:  Evelyn Yanes APRN CNP as PCP - General (Family Medicine)  Evelyn Yanes APRN CNP as Assigned PCP  Jasmeet Shah DO as Assigned Musculoskeletal Provider    The following health maintenance items are reviewed in Epic and correct as of today:  Health Maintenance   Topic Date Due    Pneumococcal Vaccine: 65+ Years (1 of 2 - PCV) Never done    DTAP/TDAP/TD IMMUNIZATION (1 - Tdap) Never done    ZOSTER IMMUNIZATION (1 of 2) Never done    RSV VACCINE (Pregnancy & 60+) (1 - 1-dose 60+ series) Never done    MEDICARE  ANNUAL WELLNESS VISIT  Never done    ADVANCE CARE PLANNING  01/25/2021    INFLUENZA VACCINE (1) Never done    COVID-19 Vaccine (3 - 2023-24 season) 09/01/2023    LIPID  11/21/2023    ANNUAL REVIEW OF HM ORDERS  11/21/2023    FALL RISK ASSESSMENT  03/27/2025    GLUCOSE  11/21/2025    DEXA  01/25/2031    HEPATITIS C SCREENING  Completed    PHQ-2 (once per calendar year)  Completed    IPV IMMUNIZATION  Aged Out    HPV IMMUNIZATION  Aged Out    MENINGITIS IMMUNIZATION  Aged Out    RSV MONOCLONAL ANTIBODY  Aged Out    MAMMO SCREENING  Discontinued    LUNG CANCER SCREENING  Discontinued       Appropriate preventive services were discussed with this patient, including applicable screening as appropriate for fall prevention, nutrition, physical activity, Tobacco-use cessation, weight loss and cognition.  Checklist reviewing preventive services available has been given to the patient.           3/27/2024   Mini Cog   Clock Draw Score 2 Normal   3 Item Recall 1 object recalled   Mini Cog Total Score 3              Here today for annual wellness visit and medication check.  Is fasting today for labs. Working at [employer]. Plans to quit in a couple of weeks. Plans to keep self busy at home.    Is not checking blood pressures at home.  Does take medications daily.  Rarely forgets to take medications.    Has history of coronary artery disease with stent placement.  When is going upstairs will be short of breath.  Looking back over the last year is more short of breath now than it was 1 year ago.  No chest pain, palpitations, dizziness or lightheadedness.  Is tired and fatigued but thinks is more related to not sleeping well at night.  Is not sleeping well at night due to legs feeling hot and burning.  Has neuropathy.  Is currently taking gabapentin 300 mg 4 times per day.    Over the summer started to notice of rash underneath breasts.  Does get itchy at times.  Will be red and pink.        Objective    /78 (BP Location:  Left arm, Patient Position: Sitting, Cuff Size: Adult Large)   Pulse 74   Temp 98  F (36.7  C) (Tympanic)   Resp 18   Wt 70.5 kg (155 lb 6.4 oz)   SpO2 98%   BMI 27.53 kg/m    Body mass index is 27.53 kg/m .  Physical Exam  Constitutional:       Appearance: Normal appearance. She is well-developed.   HENT:      Head: Normocephalic and atraumatic.      Right Ear: Tympanic membrane and external ear normal. No middle ear effusion.      Left Ear: Tympanic membrane and external ear normal.  No middle ear effusion.      Nose: No mucosal edema.   Neck:      Thyroid: No thyromegaly.      Vascular: No carotid bruit.   Cardiovascular:      Rate and Rhythm: Normal rate and regular rhythm.      Heart sounds: Normal heart sounds.   Pulmonary:      Effort: Pulmonary effort is normal.      Breath sounds: Normal breath sounds.   Abdominal:      General: Bowel sounds are normal.      Palpations: Abdomen is soft.   Skin:     General: Skin is warm and dry.      Comments: Faint erythemic rash under bilateral breasts   Neurological:      Mental Status: She is alert.   Psychiatric:         Behavior: Behavior normal.              Signed Electronically by: EMORY Mcgee CNP

## 2024-04-29 ENCOUNTER — HOSPITAL ENCOUNTER (OUTPATIENT)
Dept: NUCLEAR MEDICINE | Facility: CLINIC | Age: 79
Setting detail: NUCLEAR MEDICINE
Discharge: HOME | End: 2024-04-29
Attending: NURSE PRACTITIONER
Payer: COMMERCIAL

## 2024-04-29 ENCOUNTER — HOSPITAL ENCOUNTER (OUTPATIENT)
Dept: CARDIOLOGY | Facility: CLINIC | Age: 79
Discharge: HOME | End: 2024-04-29
Attending: NURSE PRACTITIONER
Payer: COMMERCIAL

## 2024-04-29 VITALS — HEIGHT: 63 IN | BODY MASS INDEX: 27.46 KG/M2 | WEIGHT: 155 LBS

## 2024-04-29 DIAGNOSIS — R06.02 SOB (SHORTNESS OF BREATH): ICD-10-CM

## 2024-04-29 DIAGNOSIS — I25.10 ATHEROSCLEROSIS OF CORONARY ARTERY OF NATIVE HEART WITHOUT ANGINA PECTORIS, UNSPECIFIED VESSEL OR LESION TYPE: ICD-10-CM

## 2024-04-29 DIAGNOSIS — E78.5 HYPERLIPIDEMIA LDL GOAL <130: ICD-10-CM

## 2024-04-29 LAB
CV STRESS MAX HR HE: 141
NUC STRESS EJECTION FRACTION: 53 %
RATE PRESSURE PRODUCT: NORMAL
STRESS ANGINA INDEX: 0
STRESS ECHO BASELINE DIASTOLIC HE: 72
STRESS ECHO BASELINE HR: 87 BPM
STRESS ECHO BASELINE SYSTOLIC BP: 148
STRESS ECHO CALCULATED PERCENT HR: 99 %
STRESS ECHO LAST STRESS DIASTOLIC BP: 72
STRESS ECHO LAST STRESS SYSTOLIC BP: 182
STRESS ECHO POST ESTIMATED WORKLOAD: 4.7 METS
STRESS ECHO POST EXERCISE DUR MIN: 3 MIN
STRESS ECHO POST EXERCISE DUR SEC: 32 SEC
STRESS ECHO TARGET HR: 143

## 2024-04-29 PROCEDURE — A9502 TC99M TETROFOSMIN: HCPCS | Performed by: NURSE PRACTITIONER

## 2024-04-29 PROCEDURE — 93018 CV STRESS TEST I&R ONLY: CPT | Performed by: INTERNAL MEDICINE

## 2024-04-29 PROCEDURE — 93016 CV STRESS TEST SUPVJ ONLY: CPT | Performed by: INTERNAL MEDICINE

## 2024-04-29 PROCEDURE — 78452 HT MUSCLE IMAGE SPECT MULT: CPT | Mod: 26 | Performed by: INTERNAL MEDICINE

## 2024-04-29 PROCEDURE — 78452 HT MUSCLE IMAGE SPECT MULT: CPT

## 2024-04-29 PROCEDURE — 343N000001 HC RX 343: Performed by: NURSE PRACTITIONER

## 2024-04-29 PROCEDURE — 93017 CV STRESS TEST TRACING ONLY: CPT

## 2024-04-29 RX ORDER — REGADENOSON 0.08 MG/ML
0.4 INJECTION, SOLUTION INTRAVENOUS ONCE
Status: DISCONTINUED | OUTPATIENT
Start: 2024-04-29 | End: 2024-04-30 | Stop reason: HOSPADM

## 2024-04-29 RX ADMIN — TETROFOSMIN 30.8 MILLICURIE: 1.38 INJECTION, POWDER, LYOPHILIZED, FOR SOLUTION INTRAVENOUS at 13:35

## 2024-04-29 RX ADMIN — TETROFOSMIN 10.4 MILLICURIE: 1.38 INJECTION, POWDER, LYOPHILIZED, FOR SOLUTION INTRAVENOUS at 12:10

## 2024-04-29 NOTE — PROGRESS NOTES
S-(situation): The pt is here for a nuclear medicine stress test for indication of SOB    B-(background): The pt reports that she was told to hold her beta blocker and states that she has not taken it the past few days. Last dose 4/26 am. She states that she would like to try to exercise on the treadmill.     A-(assessment): The pt was able to exercise 3 min 32 sec on a Mendoza protocol. The test was stopped due to fatigue and Max HR achieved. The has what appears to be atrial flutter with variable conduction for approx 7 1/2 minutes in recovery. She denies any chest pain and reports feeling her heart fluttering. When she returned to [facility] at 8:30 recovery, she states that she could feel her heart beat change back to normal. She reports having this sensation in the past.     R-(recommendations): The above information was reviewed with the supervising MD and the pt was okayed to discharge home. She has been instructed to resume taking her beta blocker. The pt was observed for approx 1 hour after the stress test and no high or abnormal heart rhythms were seen. The pt denies any complaints.   I will forward this information on to the ordering provider for review.

## 2024-04-30 DIAGNOSIS — I48.0 PAROXYSMAL ATRIAL FIBRILLATION (H): Primary | ICD-10-CM

## 2024-04-30 DIAGNOSIS — R94.39 ABNORMAL STRESS TEST: ICD-10-CM

## 2024-05-03 ENCOUNTER — OFFICE VISIT (OUTPATIENT)
Dept: CARDIOLOGY | Facility: CLINIC | Age: 79
End: 2024-05-03
Attending: NURSE PRACTITIONER
Payer: COMMERCIAL

## 2024-05-03 ENCOUNTER — TELEPHONE (OUTPATIENT)
Dept: CARDIOLOGY | Facility: CLINIC | Age: 79
End: 2024-05-03

## 2024-05-03 VITALS
WEIGHT: 159.8 LBS | HEIGHT: 63 IN | DIASTOLIC BLOOD PRESSURE: 72 MMHG | BODY MASS INDEX: 28.31 KG/M2 | OXYGEN SATURATION: 94 % | SYSTOLIC BLOOD PRESSURE: 144 MMHG | HEART RATE: 64 BPM

## 2024-05-03 DIAGNOSIS — I25.10 CORONARY ARTERY DISEASE INVOLVING NATIVE CORONARY ARTERY OF NATIVE HEART WITHOUT ANGINA PECTORIS: ICD-10-CM

## 2024-05-03 DIAGNOSIS — E78.5 HYPERLIPIDEMIA LDL GOAL <70: ICD-10-CM

## 2024-05-03 DIAGNOSIS — R94.39 ABNORMAL STRESS TEST: ICD-10-CM

## 2024-05-03 DIAGNOSIS — I10 ESSENTIAL HYPERTENSION: Primary | ICD-10-CM

## 2024-05-03 DIAGNOSIS — I25.10 CORONARY ARTERY DISEASE INVOLVING NATIVE CORONARY ARTERY OF NATIVE HEART WITHOUT ANGINA PECTORIS: Primary | ICD-10-CM

## 2024-05-03 DIAGNOSIS — I48.0 PAROXYSMAL ATRIAL FIBRILLATION (H): ICD-10-CM

## 2024-05-03 PROCEDURE — 99204 OFFICE O/P NEW MOD 45 MIN: CPT | Performed by: INTERNAL MEDICINE

## 2024-05-03 RX ORDER — METOPROLOL TARTRATE 25 MG/1
50 TABLET, FILM COATED ORAL 2 TIMES DAILY
Qty: 180 TABLET | Refills: 3 | Status: SHIPPED | OUTPATIENT
Start: 2024-05-03 | End: 2024-05-03

## 2024-05-03 RX ORDER — ATORVASTATIN CALCIUM 40 MG/1
40 TABLET, FILM COATED ORAL DAILY
Qty: 90 TABLET | Refills: 3 | Status: SHIPPED | OUTPATIENT
Start: 2024-05-03 | End: 2024-07-22

## 2024-05-03 RX ORDER — METOPROLOL TARTRATE 50 MG
50 TABLET ORAL 2 TIMES DAILY
Qty: 120 TABLET | Refills: 3 | Status: SHIPPED | OUTPATIENT
Start: 2024-05-03 | End: 2024-07-22

## 2024-05-03 NOTE — PROGRESS NOTES
CARDIOLOGY CLINIC CONSULTATION    PRIMARY CARE PHYSICIAN:  Evelyn Yanes    Tests reviewed/interpreted independently in clinic today:   EKG, Echocardiogram, Blood work.     The level of medical decision making during this visit was of moderate complexity.     HISTORY OF PRESENT ILLNESS:  Today, I had the pleasure of connecting with Abi Lees.  She is a very pleasant 78-year-old lady who presents to the clinic in initial consultation for exertional dyspnea and fluttering in the chest.  She is accompanied by her .  The patient recently underwent a stress test for above-mentioned symptoms.  During the stress test she also developed fluttering in the chest and the EKG at the time showed SVT.  There was a question about possible A-fib but on my review of the strips this is not completely clear. We had a long chat about my kids, especially my teenage daughter and her every so changing demands.    ASSESSMENT: Pertinent issues addressed/ reviewed during this cardiology visit    Palpitations-SVT versus A-fib on EKG during stress test  Exertional dyspnea-negative stress test  Essential hypertension  Hyperlipidemia-on Lipitor    RECOMMENDATIONS:  It was a pleasure to see Abi Lees in clinic today.  I am going to start her on 50 mg of metoprolol which I believe will help her symptoms.  I will also order a transthoracic echocardiogram to assess LV size, function, valves.  To definitively rule out atrial fibrillation I am going to perform a 14-day rhythm monitor.  This will be sent to her home.    I am going to have her come back and see an JONG once all these tests have resulted.  If in fact she has atrial fibrillation she would require anticoagulation due to high [id number] score.    Rashad HERNANDEZ, FACC, FASE  Cardiology - [facility]  May 3, 2024    Orders Placed This Encounter   Procedures    Follow-Up with Cardiology JONG    Echocardiogram Complete       PAST MEDICAL HISTORY:  Past Medical History:    Diagnosis Date    CAD in native artery     s/p stent placement in 2007 ANWH    HTN (hypertension), benign     Hyperlipidemia     Peripheral neuropathy     Varicose veins     bilateral with thrombophlebitis LLE       MEDICATIONS:  Current Outpatient Medications   Medication Sig Dispense Refill    aspirin 81 MG tablet Take  by mouth daily.      Cholecalciferol (VITAMIN D-3 PO)       gabapentin (NEURONTIN) 300 MG capsule Take 1 capsule 3 times per day, take 2 capsules at bedtime 450 capsule 3    hydrochlorothiazide (HYDRODIURIL) 25 MG tablet Take 1 tablet (25 mg) by mouth daily 90 tablet 3    lisinopril (ZESTRIL) 40 MG tablet Take 1 tablet (40 mg) by mouth daily 90 tablet 3    magnesium 100 MG CAPS Take 1 capsule by mouth daily      metoprolol tartrate (LOPRESSOR) 50 MG tablet Take 1 tablet (50 mg) by mouth 2 times daily 120 tablet 3    Multiple Vitamins-Minerals (PRESERVISION AREDS 2 PO) Take 1 capsule by mouth daily      nystatin (MYCOSTATIN) 345425 UNIT/GM external cream Apply to affected area twice per day as needed. 30 g 1    potassium 99 MG TABS Take 1 tablet by mouth daily      atorvastatin (LIPITOR) 40 MG tablet Take 1 tablet (40 mg) by mouth daily 90 tablet 3     No current facility-administered medications for this visit.       ALLERGIES:  No Known Allergies    SOCIAL HISTORY:  I have reviewed this patient's social history and updated it with pertinent information if needed. Abi Lees  reports that she quit smoking about 16 years ago. Her smoking use included cigarettes. She has never used smokeless tobacco. She reports current alcohol use. She reports that she does not use drugs.    FAMILY HISTORY:  I have reviewed this patient's family history and updated it with pertinent information if needed.   Family History   Problem Relation Age of Onset    Tremor Mother     Multiple Sclerosis Father        REVIEW OF SYSTEMS:  Skin:        Eyes:       ENT:       Respiratory:       Cardiovascular:      "  Gastroenterology:      Genitourinary:       Musculoskeletal:       Neurologic:       Psychiatric:       Heme/Lymph/Imm:       Endocrine:           PHYSICAL EXAM:                     Vital Signs with Ranges   BP (!) 144/72   Pulse 64   Ht 1.6 m (5' 3\")   Wt 72.5 kg (159 lb 12.8 oz)   SpO2 94%   BMI 28.31 kg/m      159 lbs 12.8 oz    Constitutional: alert, no distress  Respiratory: Good bilateral air entry  Cardiovascular: s1 s2 normal, no murmurs  GI: nondistended  Neuropsychiatric: appropriate affact      This note was completed in part using dictation via the Dragon voice recognition software. Some word and grammatical errors may occur and must be interpreted in the appropriate clinical context.  If there are any questions pertaining to this issue, please contact me for further clarification.  "

## 2024-05-03 NOTE — TELEPHONE ENCOUNTER
Patient called in with medication question.    Patient saw Dr. Jeffries, she is to stop taking the Atenolol, not the Atorvastatin.   He has prescribed Metoprolol instead.   There was a mistake that the Atorvastatin got crossed out.    Writer has re-entered the Atorvastatin and discont the Atenolol.    Patient verbalized understanding.    Debbi Woods RN on 5/3/2024 at 4:39 PM

## 2024-05-03 NOTE — TELEPHONE ENCOUNTER
[facility] Call Center    Phone Message    May a detailed message be left on voicemail: yes     Reason for Call: Other: pt is confused on which medication was discussed in todays appt that she was supposed to stop taking. Please call pt back to clarify      Action Taken: Other: cardiology     Travel Screening: Not Applicable                                                               Thank you!  [facility]

## 2024-05-03 NOTE — LETTER
5/3/2024    Evelyn Yanes, APRN CNP  [address] [facility] [place] [address]    RE: Abi Lees       Dear Colleague,     I had the pleasure of seeing Abi Lees in the [facility].  CARDIOLOGY CLINIC CONSULTATION    PRIMARY CARE PHYSICIAN:  Evelyn Yanes    Tests reviewed/interpreted independently in clinic today:   EKG, Echocardiogram, Blood work.     The level of medical decision making during this visit was of moderate complexity.     HISTORY OF PRESENT ILLNESS:  Today, I had the pleasure of connecting with Abi Lees.  She is a very pleasant 78-year-old lady who presents to the clinic in initial consultation for exertional dyspnea and fluttering in the chest.  She is accompanied by her .  The patient recently underwent a stress test for above-mentioned symptoms.  During the stress test she also developed fluttering in the chest and the EKG at the time showed SVT.  There was a question about possible A-fib but on my review of the strips this is not completely clear. We had a long chat about my kids, especially my teenage daughter and her every so changing demands.    ASSESSMENT: Pertinent issues addressed/ reviewed during this cardiology visit    Palpitations-SVT versus A-fib on EKG during stress test  Exertional dyspnea-negative stress test  Essential hypertension  Hyperlipidemia-on Lipitor    RECOMMENDATIONS:  It was a pleasure to see Abi Lees in clinic today.  I am going to start her on 50 mg of metoprolol which I believe will help her symptoms.  I will also order a transthoracic echocardiogram to assess LV size, function, valves.  To definitively rule out atrial fibrillation I am going to perform a 14-day rhythm monitor.  This will be sent to her home.    I am going to have her come back and see an JONG once all these tests have resulted.  If in fact she has atrial fibrillation she would require anticoagulation due to high [id number] score.    Rashad HERNANDEZ,  CAMRYN SHEEHAN  Cardiology - [facility]  May 3, 2024    Orders Placed This Encounter   Procedures    Follow-Up with Cardiology JONG    Echocardiogram Complete       PAST MEDICAL HISTORY:  Past Medical History:   Diagnosis Date    CAD in native artery     s/p stent placement in 2007 ANWH    HTN (hypertension), benign     Hyperlipidemia     Peripheral neuropathy     Varicose veins     bilateral with thrombophlebitis LLE       MEDICATIONS:  Current Outpatient Medications   Medication Sig Dispense Refill    aspirin 81 MG tablet Take  by mouth daily.      Cholecalciferol (VITAMIN D-3 PO)       gabapentin (NEURONTIN) 300 MG capsule Take 1 capsule 3 times per day, take 2 capsules at bedtime 450 capsule 3    hydrochlorothiazide (HYDRODIURIL) 25 MG tablet Take 1 tablet (25 mg) by mouth daily 90 tablet 3    lisinopril (ZESTRIL) 40 MG tablet Take 1 tablet (40 mg) by mouth daily 90 tablet 3    magnesium 100 MG CAPS Take 1 capsule by mouth daily      metoprolol tartrate (LOPRESSOR) 50 MG tablet Take 1 tablet (50 mg) by mouth 2 times daily 120 tablet 3    Multiple Vitamins-Minerals (PRESERVISION AREDS 2 PO) Take 1 capsule by mouth daily      nystatin (MYCOSTATIN) 401059 UNIT/GM external cream Apply to affected area twice per day as needed. 30 g 1    potassium 99 MG TABS Take 1 tablet by mouth daily      atorvastatin (LIPITOR) 40 MG tablet Take 1 tablet (40 mg) by mouth daily 90 tablet 3     No current facility-administered medications for this visit.       ALLERGIES:  No Known Allergies    SOCIAL HISTORY:  I have reviewed this patient's social history and updated it with pertinent information if needed. Abi Lees  reports that she quit smoking about 16 years ago. Her smoking use included cigarettes. She has never used smokeless tobacco. She reports current alcohol use. She reports that she does not use drugs.    FAMILY HISTORY:  I have reviewed this patient's family history and updated it with pertinent information if needed.  "  Family History   Problem Relation Age of Onset    Tremor Mother     Multiple Sclerosis Father        REVIEW OF SYSTEMS:  Skin:        Eyes:       ENT:       Respiratory:       Cardiovascular:       Gastroenterology:      Genitourinary:       Musculoskeletal:       Neurologic:       Psychiatric:       Heme/Lymph/Imm:       Endocrine:           PHYSICAL EXAM:                     Vital Signs with Ranges   BP (!) 144/72   Pulse 64   Ht 1.6 m (5' 3\")   Wt 72.5 kg (159 lb 12.8 oz)   SpO2 94%   BMI 28.31 kg/m      159 lbs 12.8 oz    Constitutional: alert, no distress  Respiratory: Good bilateral air entry  Cardiovascular: s1 s2 normal, no murmurs  GI: nondistended  Neuropsychiatric: appropriate affact      This note was completed in part using dictation via the Dragon voice recognition software. Some word and grammatical errors may occur and must be interpreted in the appropriate clinical context.  If there are any questions pertaining to this issue, please contact me for further clarification.      Thank you for allowing me to participate in the care of your patient.      Sincerely,     Rashad Jeffries MD     [facility]  cc:   Evelyn Yanes, APRN CNP  [address] [place] [address]      "

## 2024-05-07 ENCOUNTER — HOSPITAL ENCOUNTER (OUTPATIENT)
Dept: CARDIOLOGY | Facility: CLINIC | Age: 79
Discharge: HOME | End: 2024-05-07
Attending: INTERNAL MEDICINE | Admitting: INTERNAL MEDICINE
Payer: COMMERCIAL

## 2024-05-07 DIAGNOSIS — R94.39 ABNORMAL STRESS TEST: ICD-10-CM

## 2024-05-07 DIAGNOSIS — I25.10 CORONARY ARTERY DISEASE INVOLVING NATIVE CORONARY ARTERY OF NATIVE HEART WITHOUT ANGINA PECTORIS: ICD-10-CM

## 2024-05-07 LAB — LVEF ECHO: NORMAL

## 2024-05-07 PROCEDURE — 255N000002 HC RX 255 OP 636: Performed by: INTERNAL MEDICINE

## 2024-05-07 PROCEDURE — 93306 TTE W/DOPPLER COMPLETE: CPT | Mod: 26 | Performed by: INTERNAL MEDICINE

## 2024-05-07 PROCEDURE — C8929 TTE W OR WO FOL WCON,DOPPLER: HCPCS

## 2024-05-07 RX ADMIN — HUMAN ALBUMIN MICROSPHERES AND PERFLUTREN 2 ML: 10; .22 INJECTION, SOLUTION INTRAVENOUS at 08:17

## 2024-05-08 ENCOUNTER — TELEPHONE (OUTPATIENT)
Dept: CARDIOLOGY | Facility: CLINIC | Age: 79
End: 2024-05-08
Payer: COMMERCIAL

## 2024-05-08 NOTE — TELEPHONE ENCOUNTER
[facility] Call Center    Phone Message    May a detailed message be left on voicemail: yes     Reason for Call: Other: Pt made an appt for 05/29 and she is wondering if that appt works or if needs to be seen sooner     Action Taken: Other: Cardio    Travel Screening: Not Applicable

## 2024-05-09 NOTE — TELEPHONE ENCOUNTER
Called and spoke with pt. Notified her that that is our soonest visit at any location. Okay to wait till that visit.   Pt to call clinic if experiencing any new or worsening cardiac symptoms.   Pt verbalized an understanding.     Judit Smith RN

## 2024-05-10 ENCOUNTER — ORDERS ONLY (AUTO-RELEASED) (OUTPATIENT)
Dept: CARDIOLOGY | Facility: CLINIC | Age: 79
End: 2024-05-10
Payer: COMMERCIAL

## 2024-05-10 DIAGNOSIS — I48.0 PAROXYSMAL ATRIAL FIBRILLATION (H): ICD-10-CM

## 2024-05-10 DIAGNOSIS — R94.39 ABNORMAL STRESS TEST: ICD-10-CM

## 2024-05-25 PROCEDURE — 93244 EXT ECG>48HR<7D REV&INTERPJ: CPT | Performed by: INTERNAL MEDICINE

## 2024-05-29 ENCOUNTER — OFFICE VISIT (OUTPATIENT)
Dept: CARDIOLOGY | Facility: CLINIC | Age: 79
End: 2024-05-29
Payer: COMMERCIAL

## 2024-05-29 ENCOUNTER — CARE COORDINATION (OUTPATIENT)
Dept: CARDIOLOGY | Facility: CLINIC | Age: 79
End: 2024-05-29

## 2024-05-29 VITALS
SYSTOLIC BLOOD PRESSURE: 150 MMHG | OXYGEN SATURATION: 98 % | HEIGHT: 63 IN | BODY MASS INDEX: 28 KG/M2 | DIASTOLIC BLOOD PRESSURE: 73 MMHG | HEART RATE: 56 BPM | WEIGHT: 158 LBS

## 2024-05-29 DIAGNOSIS — E78.5 HYPERLIPIDEMIA LDL GOAL <70: ICD-10-CM

## 2024-05-29 DIAGNOSIS — I48.0 PAROXYSMAL ATRIAL FIBRILLATION (H): ICD-10-CM

## 2024-05-29 DIAGNOSIS — R94.39 ABNORMAL STRESS TEST: ICD-10-CM

## 2024-05-29 DIAGNOSIS — R93.1 ABNORMAL ECHOCARDIOGRAM: ICD-10-CM

## 2024-05-29 DIAGNOSIS — R06.09 DOE (DYSPNEA ON EXERTION): ICD-10-CM

## 2024-05-29 DIAGNOSIS — I25.10 CORONARY ARTERY DISEASE INVOLVING NATIVE CORONARY ARTERY OF NATIVE HEART WITHOUT ANGINA PECTORIS: Primary | ICD-10-CM

## 2024-05-29 PROCEDURE — 99214 OFFICE O/P EST MOD 30 MIN: CPT | Performed by: NURSE PRACTITIONER

## 2024-05-29 RX ORDER — LIDOCAINE 40 MG/G
CREAM TOPICAL
Status: CANCELLED | OUTPATIENT
Start: 2024-05-29

## 2024-05-29 RX ORDER — SODIUM CHLORIDE 9 MG/ML
INJECTION, SOLUTION INTRAVENOUS CONTINUOUS
Status: CANCELLED | OUTPATIENT
Start: 2024-05-29

## 2024-05-29 NOTE — PATIENT INSTRUCTIONS
Thank you for your visit with the [facility].    Today's Summary:    Start Eliquis 5 mg twice daily.  Will start this medication pending the timing of the angiogram.  If the angiogram is next week, then will hold off on starting this until after the procedure.  If the angiogram isn't for a few weeks, then you can start this and hold the medication for 2-days before the angiogram.  Complete a coronary angiogram  Repeat labs before the angiogram.    Follow-up:  Cardiology follow up at [facility]: 1-month with JONG after procedure.     Cardiology Scheduling~[phone]  Cardiology Clinic RN~[phone] (Cheryl RN, Judit RN; Porsche RN)    It was a pleasure seeing you today.     EMORY Crain, CNP  Certified Nurse Practitioner  [facility]  May 29, 2024      Coronary angiogram   [facility], [place]      Please call clinic with any new COVID like symptoms prior to procedure.    2.   Coronary angiogram to be done at [facility] on 6/12/24 . Please arrive at 6:30am . If you need to contact [facility] for any reason, please call [phone] option #2.    Call the [facility] Nurse line with any questions [phone] Judit CASILLAS, Cheryl RN; Porsche RN    In preparation for your procedure, we require that you do the following:    NO solid foods 8 hours prior to arrival and may have clear liquids up to 2 hours prior to arrival.    Take your usual morning medications with a small sip of water immediately upon arising on the morning of your procedure unless outlined below:    Aspirin:  If you are currently taking 81mg aspirin daily, please take a total of 4 tablets (325 mg)  the morning of procedure    Eliquis:  STOP taking this medication 2 days prior to procedure Take your last dose on 6/9/24    You will be unable to drive after your procedure; please arrange to have someone drive you home. Make sure that there is a  "responsible adult with you for 24 hours after your procedure. Your procedure will be cancelled if you do not have transportation home or someone staying with you for 24 hrs.    Your procedure will be done at [facility] located at [address] [facility] [address] [facility] [address]. Please park in the  Skyway Ramp  on the west side of [facility] on [address]. Take the skyway over [facility] to the hospital. Please check in on the first floor, \"Skyway Welcome Desk\" which is one floor down from the skyway level.    If you have any questions about your upcoming procedure, please contact nursing at [facility] Card[facility] clinic at [phone] or at [facility] at [phone].    ________________________________________________________   "

## 2024-05-29 NOTE — PROGRESS NOTES
~Cardiology Clinic Visit~    Primary Cardiologist: Dr. Jeffries  Reason for visit: Testing review  H&P for coronary angiogram    History of Present Illness    Abi Lees is a very pleasant 78 year old female with a past medical history notable for HTN, PAF, CAD, HLD on statin.  Note, she carries hx ARA to mLCx for 100% occlusion with 3.0x18mm Cypher stent (2006); minimal disease noted to pLAD and pRCA.  EF at that time mentioned to be at 45%    She met with Dr. Jeffries in cardiology consultation in May 2024.  She came with concerns regarding exertional dyspnea and a fluttering sensation in her chest.      The patient recently underwent a stress test for above-mentioned symptoms.  During the stress test she also developed fluttering in the chest and the EKG at the time showed SVT.  There was a question about possible A-fib.  The stress portion itself of the exam showed a small area of nontransmural infarction in the mid to basal inferolateral segments of the LF associated with a mild degree of amadou-infarct ischemia.    She was started on Metoprolol tartrate 50 mg BID and recommended TTE plus 14-day heart rhythm monitor.    Diagnostics:  5/7/24 Echocardiogram: study shows EF 45-50% with basal to mid inferolateral and basal inferior wall hypokinesis; new finding.  Dr. Jeffries reviewed and is recommended follow up testing with coronary angiogram.  5/25/24 Zio Patch: report showed PAF/Fl at <1% burden with longest episode lasting ~4.5 minutes.    Her dyspnea with exertion continues.  Largely unchanged.  She has intermittent fluttering heart rate sensation that occurs daily, usually at night with no other associated symptoms.  We discussed her testing results and recommendations.  The pathophysiology of atrial fibrillation (various causes, possibility of isolated atrial fibrillation, risk of thrombus and embolic stroke, need for anticoagulation depending on etiology and Chads-Vasc Score) was discussed with  patient and/or support person(s). She states she is very concerned about possible stroke.  Support offered.  __________________________________________________________________         Assessment and Impression:     Palpitations  Paroxysmal atrial fibrillation on remote heart monitor  Exertional dyspnea-negative stress test  Essential hypertension  Hyperlipidemia-on Lipitor  Hx known coronary artery disease with past stenting of Cx, 2006         Recommendations and Plan:     Proceed with coronary angiogram  Risks and benefits of coronary angiogram discussed today including, bleeding, bruising, infection, allergic reaction, kidney damage (including need for dialysis), stroke, heart attack, vascular damage, emergency open heart surgery, up to and including death.  Patient indicates understanding and is agreeable to proceed.    Pt instructed to be NPO from solid foods 8-hours piror to check in time.  May have clear liquids up to 2-hours piror to check in time.  Pt has transportation and 24 hours post procedure monitoring set up.  Pt aware of no driving for 24 hours post procedure.  Start Eliquis 5 mg twice daily.  Will need to hold prior to angiogram - discussed with patient.  Labs prior to coronary angiogram  JONG follow up after procedure to discuss results.  __________________________________________________________________    Thank you for the opportunity to participate in this pleasant patient's care.    We would be happy to see this patient sooner for any concerns in the meantime.    EMORY Crain, CNP   Nurse Practitioner  [facility] [place] [facility]    Today's clinic visit entailed:  The following tests were independently interpreted by me as noted in my documentation: heart monitor, echo, labs  Ordering of each unique test  Prescription drug management  The level of medical decision making during this visit was of moderate complexity.  Review of the result(s) of each unique test - cardiac testing,  cardiac imaging, labs  Care everywhere reviewed for additional records to facilitate comprehensive patient care.    Orders this Visit:  Orders Placed This Encounter   Procedures    Basic metabolic panel    Follow-Up with Cardiology- JONG    Case Request Cath Lab: Coronary Angiogram, Left Heart Catheterization, Percutaneous Coronary Intervention     Orders Placed This Encounter   Medications    apixaban ANTICOAGULANT (ELIQUIS ANTICOAGULANT) 5 MG tablet     Sig: Take 1 tablet (5 mg) by mouth 2 times daily     Dispense:  60 tablet     Refill:  3     There are no discontinued medications.  Encounter Diagnoses   Name Primary?    Coronary artery disease involving native coronary artery of native heart without angina pectoris Yes    Abnormal echocardiogram     Paroxysmal atrial fibrillation (H)     Hyperlipidemia LDL goal <70     Abnormal stress test      CURRENT MEDICATIONS:  Current Outpatient Medications   Medication Sig Dispense Refill    apixaban ANTICOAGULANT (ELIQUIS ANTICOAGULANT) 5 MG tablet Take 1 tablet (5 mg) by mouth 2 times daily 60 tablet 3    aspirin 81 MG tablet Take  by mouth daily.      atorvastatin (LIPITOR) 40 MG tablet Take 1 tablet (40 mg) by mouth daily 90 tablet 3    Cholecalciferol (VITAMIN D-3 PO)       gabapentin (NEURONTIN) 300 MG capsule Take 1 capsule 3 times per day, take 2 capsules at bedtime 450 capsule 3    hydrochlorothiazide (HYDRODIURIL) 25 MG tablet Take 1 tablet (25 mg) by mouth daily 90 tablet 3    lisinopril (ZESTRIL) 40 MG tablet Take 1 tablet (40 mg) by mouth daily 90 tablet 3    magnesium 100 MG CAPS Take 1 capsule by mouth daily      metoprolol tartrate (LOPRESSOR) 50 MG tablet Take 1 tablet (50 mg) by mouth 2 times daily 120 tablet 3    Multiple Vitamins-Minerals (PRESERVISION AREDS 2 PO) Take 1 capsule by mouth daily      nystatin (MYCOSTATIN) 536772 UNIT/GM external cream Apply to affected area twice per day as needed. 30 g 1    potassium 99 MG TABS Take 1 tablet by mouth  "daily       ALLERGIES   No Known Allergies  PAST MEDICAL, SURGICAL, FAMILY, SOCIAL HISTORY:  History was reviewed and updated as needed, see medical record.    Review of Systems:  A 10-point Review Of Systems is otherwise normal except for that which is noted in the HPI and interval summary.    Physical Exam:    Vitals: BP (!) 150/73   Pulse 56   Ht 1.6 m (5' 3\")   Wt 71.7 kg (158 lb)   SpO2 98%   BMI 27.99 kg/m    Constitutional: Appears stated age, well nourished, NAD.  Neck: Supple. Carotid pulses full and equal.   Respiratory: Non-labored. Lungs CTAB.  Cardiovascular: RRR, normal S1 and S2. No M/G/R.  No edema.  GI: Soft, non-distended, non-tender.  Skin: Warm and dry.   Musculoskeletal/Extremities: Symmetrical movement.  Neurologic: No gross focal deficits. Alert, awake.  Psychiatric: Affect appropriate. Mentation normal.    Recent Lab Results:  LIPID RESULTS:  Lab Results   Component Value Date    CHOL 174 03/27/2024    CHOL 180 07/09/2018    HDL 43 (L) 03/27/2024    HDL 60 07/09/2018     (H) 03/27/2024    LDL 96 07/09/2018    TRIG 145 03/27/2024    TRIG 120 07/09/2018     LIVER ENZYME RESULTS:  Lab Results   Component Value Date    AST 21 07/16/2021    AST 27 07/09/2018    ALT 24 07/16/2021    ALT 24 07/09/2018     CBC RESULTS:  Lab Results   Component Value Date    WBC 9.3 01/25/2022    WBC 8.2 03/15/2016    RBC 4.09 01/25/2022    RBC 4.33 03/15/2016    HGB 10.7 (L) 01/25/2022    HGB 12.2 03/15/2019    HCT 35.3 01/25/2022    HCT 38.6 03/15/2016    MCV 86 01/25/2022    MCV 89 03/15/2016    MCH 26.2 (L) 01/25/2022    MCH 30.5 03/15/2016    MCHC 30.3 (L) 01/25/2022    MCHC 34.2 03/15/2016    RDW 14.8 01/25/2022    RDW 12.6 03/15/2016     01/25/2022     03/15/2016     BMP RESULTS:  Lab Results   Component Value Date     03/27/2024     03/15/2019    POTASSIUM 4.5 03/27/2024    POTASSIUM 3.5 11/21/2022    POTASSIUM 3.9 03/15/2019    CHLORIDE 103 03/27/2024    CHLORIDE 105 " "11/21/2022    CHLORIDE 103 03/15/2019    CO2 26 03/27/2024    CO2 26 11/21/2022    CO2 29 03/15/2019    ANIONGAP 12 03/27/2024    ANIONGAP 8 11/21/2022    ANIONGAP 6 03/15/2019     (H) 03/27/2024     (H) 11/21/2022     (H) 03/15/2019    BUN 29.2 (H) 03/27/2024    BUN 21 11/21/2022    BUN 25 03/15/2019    CR 0.98 (H) 03/27/2024    CR 0.86 03/15/2019    GFRESTIMATED 59 (L) 03/27/2024    GFRESTIMATED 67 03/15/2019    GFRESTBLACK 77 03/15/2019    ELIJAH 9.8 03/27/2024    ELIJAH 9.3 03/15/2019      A1C RESULTS:  No results found for: \"A1C\"  INR RESULTS:  No results found for: \"INR\"                  "

## 2024-05-29 NOTE — PROGRESS NOTES
See AVS from visit with Evelyn Robins CNP on 5/29/24. All instructions and directions reviewed with pt and all questions answered. Pt verbalized an understanding.       Coronary angiogram/PCI/Right Heart Cath prep instructions.     Patient is scheduled for a Coronary Angio w/possible PCI at [facility] - [address], [place] [address] - Main Entrance of the Hospital on 6/12/24.  Check in time is at 6:30am and procedure to follow.    Patient instructed to remain NPO for solid foods 8 hours prior to arrival and may have clear liquids up to 2 hours prior to arrival.    Patient Patient does not require extra fluids prior to procedure.    Patient is not diabetic.    Patient is on Eliquis (Eliquis, Pradaxa, Xarelto) and has been advised to hold for 2 days prior to procedure starting 6/10/24.  Patient can resume after the procedure.    Patient is not on diuretics.     Patient is taking ASA 81mg daily and will take 4 tabs (324mg) the morning of the procedure.    Pt is not on a SGLT2 inhibitor.    Pt is not on a GLP-1 Agonist    Patient advised to take their other daily medications the morning of the procedure with small sips of water.     Verified patient does not have a contrast allergy.    Verified patient has someone available to drive them home from the hospital and can stay with them for 24 hours after the procedure.     Patient advised to notify care team with any new COVID like symptoms prior to procedure. Day of procedure phone number: Guillermo at [phone]    Patient is aware of visitor policy.    Patient expresses understanding of above instructions and denies further questions at this time.      Judit Smith RN  [facility]

## 2024-05-29 NOTE — LETTER
5/29/2024    Evelyn Yanes, APRN CNP  [address] [facility] [place] [address]    RE: Abi Lees       Dear Colleague,     I had the pleasure of seeing Abi Lees in the [facility].              ~Cardiology Clinic Visit~    Primary Cardiologist: Dr. Jeffries  Reason for visit: Testing review  H&P for coronary angiogram    History of Present Illness    Abi Lees is a very pleasant 78 year old female with a past medical history notable for HTN, PAF, CAD, HLD on statin.  Note, she carries hx ARA to mLCx for 100% occlusion with 3.0x18mm Cypher stent (2006); minimal disease noted to pLAD and pRCA.  EF at that time mentioned to be at 45%    She met with Dr. Jeffries in cardiology consultation in May 2024.  She came with concerns regarding exertional dyspnea and a fluttering sensation in her chest.      The patient recently underwent a stress test for above-mentioned symptoms.  During the stress test she also developed fluttering in the chest and the EKG at the time showed SVT.  There was a question about possible A-fib.  The stress portion itself of the exam showed a small area of nontransmural infarction in the mid to basal inferolateral segments of the LF associated with a mild degree of amadou-infarct ischemia.    She was started on Metoprolol tartrate 50 mg BID and recommended TTE plus 14-day heart rhythm monitor.    Diagnostics:  5/7/24 Echocardiogram: study shows EF 45-50% with basal to mid inferolateral and basal inferior wall hypokinesis; new finding.  Dr. Jeffries reviewed and is recommended follow up testing with coronary angiogram.  5/25/24 Zio Patch: report showed PAF/Fl at <1% burden with longest episode lasting ~4.5 minutes.    Her dyspnea with exertion continues.  Largely unchanged.  She has intermittent fluttering heart rate sensation that occurs daily, usually at night with no other associated symptoms.  We discussed her testing results and recommendations.  The pathophysiology of  atrial fibrillation (various causes, possibility of isolated atrial fibrillation, risk of thrombus and embolic stroke, need for anticoagulation depending on etiology and Chads-Vasc Score) was discussed with patient and/or support person(s). She states she is very concerned about possible stroke.  Support offered.  __________________________________________________________________         Assessment and Impression:     Palpitations  Paroxysmal atrial fibrillation on remote heart monitor  Exertional dyspnea-negative stress test  Essential hypertension  Hyperlipidemia-on Lipitor  Hx known coronary artery disease with past stenting of Cx, 2006         Recommendations and Plan:     Proceed with coronary angiogram  Risks and benefits of coronary angiogram discussed today including, bleeding, bruising, infection, allergic reaction, kidney damage (including need for dialysis), stroke, heart attack, vascular damage, emergency open heart surgery, up to and including death.  Patient indicates understanding and is agreeable to proceed.    Pt instructed to be NPO from solid foods 8-hours piror to check in time.  May have clear liquids up to 2-hours piror to check in time.  Pt has transportation and 24 hours post procedure monitoring set up.  Pt aware of no driving for 24 hours post procedure.  Start Eliquis 5 mg twice daily.  Will need to hold prior to angiogram - discussed with patient.  Labs prior to coronary angiogram  JONG follow up after procedure to discuss results.  __________________________________________________________________    Thank you for the opportunity to participate in this pleasant patient's care.    We would be happy to see this patient sooner for any concerns in the meantime.    EMORY Crain, CNP   Nurse Practitioner  [facility] - [facility] Care    Today's clinic visit entailed:  The following tests were independently interpreted by me as noted in my documentation: heart monitor, echo,  labs  Ordering of each unique test  Prescription drug management  The level of medical decision making during this visit was of moderate complexity.  Review of the result(s) of each unique test - cardiac testing, cardiac imaging, labs  Care everywhere reviewed for additional records to facilitate comprehensive patient care.    Orders this Visit:  Orders Placed This Encounter   Procedures    Basic metabolic panel    Follow-Up with Cardiology- JONG    Case Request Cath Lab: Coronary Angiogram, Left Heart Catheterization, Percutaneous Coronary Intervention     Orders Placed This Encounter   Medications    apixaban ANTICOAGULANT (ELIQUIS ANTICOAGULANT) 5 MG tablet     Sig: Take 1 tablet (5 mg) by mouth 2 times daily     Dispense:  60 tablet     Refill:  3     There are no discontinued medications.  Encounter Diagnoses   Name Primary?    Coronary artery disease involving native coronary artery of native heart without angina pectoris Yes    Abnormal echocardiogram     Paroxysmal atrial fibrillation (H)     Hyperlipidemia LDL goal <70     Abnormal stress test      CURRENT MEDICATIONS:  Current Outpatient Medications   Medication Sig Dispense Refill    apixaban ANTICOAGULANT (ELIQUIS ANTICOAGULANT) 5 MG tablet Take 1 tablet (5 mg) by mouth 2 times daily 60 tablet 3    aspirin 81 MG tablet Take  by mouth daily.      atorvastatin (LIPITOR) 40 MG tablet Take 1 tablet (40 mg) by mouth daily 90 tablet 3    Cholecalciferol (VITAMIN D-3 PO)       gabapentin (NEURONTIN) 300 MG capsule Take 1 capsule 3 times per day, take 2 capsules at bedtime 450 capsule 3    hydrochlorothiazide (HYDRODIURIL) 25 MG tablet Take 1 tablet (25 mg) by mouth daily 90 tablet 3    lisinopril (ZESTRIL) 40 MG tablet Take 1 tablet (40 mg) by mouth daily 90 tablet 3    magnesium 100 MG CAPS Take 1 capsule by mouth daily      metoprolol tartrate (LOPRESSOR) 50 MG tablet Take 1 tablet (50 mg) by mouth 2 times daily 120 tablet 3    Multiple Vitamins-Minerals  "(PRESERVISION AREDS 2 PO) Take 1 capsule by mouth daily      nystatin (MYCOSTATIN) 354283 UNIT/GM external cream Apply to affected area twice per day as needed. 30 g 1    potassium 99 MG TABS Take 1 tablet by mouth daily       ALLERGIES   No Known Allergies  PAST MEDICAL, SURGICAL, FAMILY, SOCIAL HISTORY:  History was reviewed and updated as needed, see medical record.    Review of Systems:  A 10-point Review Of Systems is otherwise normal except for that which is noted in the HPI and interval summary.    Physical Exam:    Vitals: BP (!) 150/73   Pulse 56   Ht 1.6 m (5' 3\")   Wt 71.7 kg (158 lb)   SpO2 98%   BMI 27.99 kg/m    Constitutional: Appears stated age, well nourished, NAD.  Neck: Supple. Carotid pulses full and equal.   Respiratory: Non-labored. Lungs CTAB.  Cardiovascular: RRR, normal S1 and S2. No M/G/R.  No edema.  GI: Soft, non-distended, non-tender.  Skin: Warm and dry.   Musculoskeletal/Extremities: Symmetrical movement.  Neurologic: No gross focal deficits. Alert, awake.  Psychiatric: Affect appropriate. Mentation normal.    Recent Lab Results:  LIPID RESULTS:  Lab Results   Component Value Date    CHOL 174 03/27/2024    CHOL 180 07/09/2018    HDL 43 (L) 03/27/2024    HDL 60 07/09/2018     (H) 03/27/2024    LDL 96 07/09/2018    TRIG 145 03/27/2024    TRIG 120 07/09/2018     LIVER ENZYME RESULTS:  Lab Results   Component Value Date    AST 21 07/16/2021    AST 27 07/09/2018    ALT 24 07/16/2021    ALT 24 07/09/2018     CBC RESULTS:  Lab Results   Component Value Date    WBC 9.3 01/25/2022    WBC 8.2 03/15/2016    RBC 4.09 01/25/2022    RBC 4.33 03/15/2016    HGB 10.7 (L) 01/25/2022    HGB 12.2 03/15/2019    HCT 35.3 01/25/2022    HCT 38.6 03/15/2016    MCV 86 01/25/2022    MCV 89 03/15/2016    MCH 26.2 (L) 01/25/2022    MCH 30.5 03/15/2016    MCHC 30.3 (L) 01/25/2022    MCHC 34.2 03/15/2016    RDW 14.8 01/25/2022    RDW 12.6 03/15/2016     01/25/2022     03/15/2016     BMP " "RESULTS:  Lab Results   Component Value Date     03/27/2024     03/15/2019    POTASSIUM 4.5 03/27/2024    POTASSIUM 3.5 11/21/2022    POTASSIUM 3.9 03/15/2019    CHLORIDE 103 03/27/2024    CHLORIDE 105 11/21/2022    CHLORIDE 103 03/15/2019    CO2 26 03/27/2024    CO2 26 11/21/2022    CO2 29 03/15/2019    ANIONGAP 12 03/27/2024    ANIONGAP 8 11/21/2022    ANIONGAP 6 03/15/2019     (H) 03/27/2024     (H) 11/21/2022     (H) 03/15/2019    BUN 29.2 (H) 03/27/2024    BUN 21 11/21/2022    BUN 25 03/15/2019    CR 0.98 (H) 03/27/2024    CR 0.86 03/15/2019    GFRESTIMATED 59 (L) 03/27/2024    GFRESTIMATED 67 03/15/2019    GFRESTBLACK 77 03/15/2019    ELIJAH 9.8 03/27/2024    ELIJAH 9.3 03/15/2019      A1C RESULTS:  No results found for: \"A1C\"  INR RESULTS:  No results found for: \"INR\"      Thank you for allowing me to participate in the care of your patient.      Sincerely,     EMORY Crain CNP     [facility]  cc:   EMORY Crain CNP  [address]  [place] [address]      "

## 2024-06-03 DIAGNOSIS — B37.2 CANDIDAL INTERTRIGO: ICD-10-CM

## 2024-06-04 RX ORDER — NYSTATIN 100000 U/G
CREAM TOPICAL
Qty: 30 G | Refills: 1 | Status: SHIPPED | OUTPATIENT
Start: 2024-06-04

## 2024-06-10 ENCOUNTER — LAB (OUTPATIENT)
Dept: LAB | Facility: CLINIC | Age: 79
End: 2024-06-10
Payer: COMMERCIAL

## 2024-06-10 DIAGNOSIS — I25.10 CORONARY ARTERY DISEASE INVOLVING NATIVE CORONARY ARTERY OF NATIVE HEART WITHOUT ANGINA PECTORIS: ICD-10-CM

## 2024-06-10 PROCEDURE — 36415 COLL VENOUS BLD VENIPUNCTURE: CPT

## 2024-06-10 PROCEDURE — 80048 BASIC METABOLIC PNL TOTAL CA: CPT

## 2024-06-11 LAB
ANION GAP SERPL CALCULATED.3IONS-SCNC: 11 MMOL/L (ref 7–15)
BUN SERPL-MCNC: 24.5 MG/DL (ref 8–23)
CALCIUM SERPL-MCNC: 10 MG/DL (ref 8.8–10.2)
CHLORIDE SERPL-SCNC: 103 MMOL/L (ref 98–107)
CREAT SERPL-MCNC: 1.06 MG/DL (ref 0.51–0.95)
DEPRECATED HCO3 PLAS-SCNC: 25 MMOL/L (ref 22–29)
EGFRCR SERPLBLD CKD-EPI 2021: 54 ML/MIN/1.73M2
GLUCOSE SERPL-MCNC: 100 MG/DL (ref 70–99)
POTASSIUM SERPL-SCNC: 4.5 MMOL/L (ref 3.4–5.3)
SODIUM SERPL-SCNC: 139 MMOL/L (ref 135–145)

## 2024-06-12 ENCOUNTER — HOSPITAL ENCOUNTER (OUTPATIENT)
Facility: CLINIC | Age: 79
Discharge: HOME | End: 2024-06-12
Attending: INTERNAL MEDICINE | Admitting: INTERNAL MEDICINE
Payer: COMMERCIAL

## 2024-06-12 VITALS
TEMPERATURE: 98.8 F | DIASTOLIC BLOOD PRESSURE: 67 MMHG | OXYGEN SATURATION: 99 % | HEART RATE: 59 BPM | BODY MASS INDEX: 28 KG/M2 | SYSTOLIC BLOOD PRESSURE: 152 MMHG | HEIGHT: 63 IN | WEIGHT: 158 LBS | RESPIRATION RATE: 16 BRPM

## 2024-06-12 DIAGNOSIS — I25.10 CORONARY ARTERY DISEASE INVOLVING NATIVE CORONARY ARTERY OF NATIVE HEART WITHOUT ANGINA PECTORIS: ICD-10-CM

## 2024-06-12 DIAGNOSIS — R93.1 ABNORMAL ECHOCARDIOGRAM: ICD-10-CM

## 2024-06-12 DIAGNOSIS — R06.09 DOE (DYSPNEA ON EXERTION): ICD-10-CM

## 2024-06-12 PROBLEM — Z98.890 STATUS POST CORONARY ANGIOGRAM: Status: ACTIVE | Noted: 2024-06-12

## 2024-06-12 LAB
ANION GAP SERPL CALCULATED.3IONS-SCNC: 11 MMOL/L (ref 7–15)
APTT PPP: 28 SECONDS (ref 22–38)
ATRIAL RATE - MUSE: 54 BPM
BUN SERPL-MCNC: 23.2 MG/DL (ref 8–23)
CALCIUM SERPL-MCNC: 9.5 MG/DL (ref 8.8–10.2)
CHLORIDE SERPL-SCNC: 106 MMOL/L (ref 98–107)
CREAT SERPL-MCNC: 0.99 MG/DL (ref 0.51–0.95)
DEPRECATED HCO3 PLAS-SCNC: 26 MMOL/L (ref 22–29)
DIASTOLIC BLOOD PRESSURE - MUSE: NORMAL MMHG
EGFRCR SERPLBLD CKD-EPI 2021: 58 ML/MIN/1.73M2
ERYTHROCYTE [DISTWIDTH] IN BLOOD BY AUTOMATED COUNT: 16.7 % (ref 10–15)
GLUCOSE SERPL-MCNC: 101 MG/DL (ref 70–99)
HCT VFR BLD AUTO: 32.3 % (ref 35–47)
HGB BLD-MCNC: 9.5 G/DL (ref 11.7–15.7)
INR PPP: 1.03 (ref 0.85–1.15)
INTERPRETATION ECG - MUSE: NORMAL
MCH RBC QN AUTO: 23.4 PG (ref 26.5–33)
MCHC RBC AUTO-ENTMCNC: 29.4 G/DL (ref 31.5–36.5)
MCV RBC AUTO: 80 FL (ref 78–100)
P AXIS - MUSE: 46 DEGREES
PLATELET # BLD AUTO: 212 10E3/UL (ref 150–450)
POTASSIUM SERPL-SCNC: 3.9 MMOL/L (ref 3.4–5.3)
PR INTERVAL - MUSE: 158 MS
QRS DURATION - MUSE: 94 MS
QT - MUSE: 452 MS
QTC - MUSE: 428 MS
R AXIS - MUSE: -27 DEGREES
RBC # BLD AUTO: 4.06 10E6/UL (ref 3.8–5.2)
SODIUM SERPL-SCNC: 143 MMOL/L (ref 135–145)
SYSTOLIC BLOOD PRESSURE - MUSE: NORMAL MMHG
T AXIS - MUSE: 11 DEGREES
VENTRICULAR RATE- MUSE: 54 BPM
WBC # BLD AUTO: 8.4 10E3/UL (ref 4–11)

## 2024-06-12 PROCEDURE — 82374 ASSAY BLOOD CARBON DIOXIDE: CPT | Performed by: NURSE PRACTITIONER

## 2024-06-12 PROCEDURE — 250N000009 HC RX 250: Performed by: INTERNAL MEDICINE

## 2024-06-12 PROCEDURE — 999N000054 HC STATISTIC EKG NON-CHARGEABLE

## 2024-06-12 PROCEDURE — 93005 ELECTROCARDIOGRAM TRACING: CPT

## 2024-06-12 PROCEDURE — 85730 THROMBOPLASTIN TIME PARTIAL: CPT | Performed by: NURSE PRACTITIONER

## 2024-06-12 PROCEDURE — 258N000003 HC RX IP 258 OP 636: Mod: JZ | Performed by: NURSE PRACTITIONER

## 2024-06-12 PROCEDURE — 99153 MOD SED SAME PHYS/QHP EA: CPT | Performed by: INTERNAL MEDICINE

## 2024-06-12 PROCEDURE — 99152 MOD SED SAME PHYS/QHP 5/>YRS: CPT | Performed by: INTERNAL MEDICINE

## 2024-06-12 PROCEDURE — 93458 L HRT ARTERY/VENTRICLE ANGIO: CPT | Performed by: INTERNAL MEDICINE

## 2024-06-12 PROCEDURE — 85610 PROTHROMBIN TIME: CPT | Performed by: NURSE PRACTITIONER

## 2024-06-12 PROCEDURE — C1769 GUIDE WIRE: HCPCS | Performed by: INTERNAL MEDICINE

## 2024-06-12 PROCEDURE — 250N000011 HC RX IP 250 OP 636: Performed by: INTERNAL MEDICINE

## 2024-06-12 PROCEDURE — 85027 COMPLETE CBC AUTOMATED: CPT | Performed by: NURSE PRACTITIONER

## 2024-06-12 PROCEDURE — 272N000001 HC OR GENERAL SUPPLY STERILE: Performed by: INTERNAL MEDICINE

## 2024-06-12 PROCEDURE — 999N000071 HC STATISTIC HEART CATH LAB OR EP LAB

## 2024-06-12 PROCEDURE — 82565 ASSAY OF CREATININE: CPT | Performed by: NURSE PRACTITIONER

## 2024-06-12 PROCEDURE — 93458 L HRT ARTERY/VENTRICLE ANGIO: CPT | Mod: 26 | Performed by: INTERNAL MEDICINE

## 2024-06-12 PROCEDURE — 999N000184 HC STATISTIC TELEMETRY

## 2024-06-12 PROCEDURE — 36415 COLL VENOUS BLD VENIPUNCTURE: CPT | Performed by: NURSE PRACTITIONER

## 2024-06-12 PROCEDURE — 250N000011 HC RX IP 250 OP 636: Mod: JZ | Performed by: INTERNAL MEDICINE

## 2024-06-12 PROCEDURE — C1894 INTRO/SHEATH, NON-LASER: HCPCS | Performed by: INTERNAL MEDICINE

## 2024-06-12 PROCEDURE — 99152 MOD SED SAME PHYS/QHP 5/>YRS: CPT | Mod: GC | Performed by: INTERNAL MEDICINE

## 2024-06-12 RX ORDER — FENTANYL CITRATE 50 UG/ML
25 INJECTION, SOLUTION INTRAMUSCULAR; INTRAVENOUS
Status: DISCONTINUED | OUTPATIENT
Start: 2024-06-12 | End: 2024-06-12 | Stop reason: HOSPADM

## 2024-06-12 RX ORDER — SODIUM CHLORIDE 9 MG/ML
INJECTION, SOLUTION INTRAVENOUS CONTINUOUS
Status: DISCONTINUED | OUTPATIENT
Start: 2024-06-12 | End: 2024-06-12 | Stop reason: HOSPADM

## 2024-06-12 RX ORDER — OXYCODONE HYDROCHLORIDE 5 MG/1
5 TABLET ORAL EVERY 4 HOURS PRN
Status: DISCONTINUED | OUTPATIENT
Start: 2024-06-12 | End: 2024-06-12 | Stop reason: HOSPADM

## 2024-06-12 RX ORDER — OXYCODONE HYDROCHLORIDE 5 MG/1
10 TABLET ORAL EVERY 4 HOURS PRN
Status: DISCONTINUED | OUTPATIENT
Start: 2024-06-12 | End: 2024-06-12 | Stop reason: HOSPADM

## 2024-06-12 RX ORDER — NITROGLYCERIN 5 MG/ML
VIAL (ML) INTRAVENOUS
Status: DISCONTINUED | OUTPATIENT
Start: 2024-06-12 | End: 2024-06-12 | Stop reason: HOSPADM

## 2024-06-12 RX ORDER — ATROPINE SULFATE 0.1 MG/ML
0.5 INJECTION INTRAVENOUS
Status: DISCONTINUED | OUTPATIENT
Start: 2024-06-12 | End: 2024-06-12 | Stop reason: HOSPADM

## 2024-06-12 RX ORDER — ACETAMINOPHEN 325 MG/1
650 TABLET ORAL EVERY 4 HOURS PRN
Status: DISCONTINUED | OUTPATIENT
Start: 2024-06-12 | End: 2024-06-12 | Stop reason: HOSPADM

## 2024-06-12 RX ORDER — HEPARIN SODIUM 1000 [USP'U]/ML
INJECTION, SOLUTION INTRAVENOUS; SUBCUTANEOUS
Status: DISCONTINUED | OUTPATIENT
Start: 2024-06-12 | End: 2024-06-12 | Stop reason: HOSPADM

## 2024-06-12 RX ORDER — LIDOCAINE 40 MG/G
CREAM TOPICAL
Status: DISCONTINUED | OUTPATIENT
Start: 2024-06-12 | End: 2024-06-12 | Stop reason: HOSPADM

## 2024-06-12 RX ORDER — FLUMAZENIL 0.1 MG/ML
0.2 INJECTION, SOLUTION INTRAVENOUS
Status: DISCONTINUED | OUTPATIENT
Start: 2024-06-12 | End: 2024-06-12 | Stop reason: HOSPADM

## 2024-06-12 RX ORDER — VERAPAMIL HYDROCHLORIDE 2.5 MG/ML
INJECTION, SOLUTION INTRAVENOUS
Status: DISCONTINUED | OUTPATIENT
Start: 2024-06-12 | End: 2024-06-12 | Stop reason: HOSPADM

## 2024-06-12 RX ORDER — NALOXONE HYDROCHLORIDE 0.4 MG/ML
0.4 INJECTION, SOLUTION INTRAMUSCULAR; INTRAVENOUS; SUBCUTANEOUS
Status: DISCONTINUED | OUTPATIENT
Start: 2024-06-12 | End: 2024-06-12 | Stop reason: HOSPADM

## 2024-06-12 RX ORDER — FENTANYL CITRATE 50 UG/ML
INJECTION, SOLUTION INTRAMUSCULAR; INTRAVENOUS
Status: DISCONTINUED | OUTPATIENT
Start: 2024-06-12 | End: 2024-06-12 | Stop reason: HOSPADM

## 2024-06-12 RX ORDER — SODIUM CHLORIDE 9 MG/ML
INJECTION, SOLUTION INTRAVENOUS CONTINUOUS
Status: ACTIVE | OUTPATIENT
Start: 2024-06-12 | End: 2024-06-12

## 2024-06-12 RX ORDER — IOPAMIDOL 755 MG/ML
INJECTION, SOLUTION INTRAVASCULAR
Status: DISCONTINUED | OUTPATIENT
Start: 2024-06-12 | End: 2024-06-12 | Stop reason: HOSPADM

## 2024-06-12 RX ORDER — NALOXONE HYDROCHLORIDE 0.4 MG/ML
0.2 INJECTION, SOLUTION INTRAMUSCULAR; INTRAVENOUS; SUBCUTANEOUS
Status: DISCONTINUED | OUTPATIENT
Start: 2024-06-12 | End: 2024-06-12 | Stop reason: HOSPADM

## 2024-06-12 RX ADMIN — SODIUM CHLORIDE: 9 INJECTION, SOLUTION INTRAVENOUS at 07:00

## 2024-06-12 ASSESSMENT — ACTIVITIES OF DAILY LIVING (ADL)
ADLS_ACUITY_SCORE: 35

## 2024-06-12 NOTE — Clinical Note
Hemodynamic equipment used: 5 lead ECG, MaestroDevK With 3 Leads, Machine BP Cuff and pulse oximeter probe.

## 2024-06-12 NOTE — PROGRESS NOTES
AVS reviewed thoroughly with pt, spouse Walker, daughter Mera at bedside. All questions answered.     Lucia Aranda RN on 6/12/2024 at 10:56 AM

## 2024-06-12 NOTE — PROGRESS NOTES
Care Suites Post Procedure Note    Patient Information  Name: Abi Lees  Age: 78 year old    Post Procedure  Time patient returned to Care Suites: 0930  Concerns/abnormal assessment: none at this time  If abnormal assessment, provider notified: N/A  Plan/Other: proceed as planned per orders    Lucia Aranda RN

## 2024-06-12 NOTE — PROGRESS NOTES
Hgb 9.5 this AM, last seen in chart 1/22 10.7. Pt reports no bleeding or symptoms. Pt took 325 + 81 mg asa this AM mistakenly - Provider aware. Proceeding as planned.    Lucia Aranda RN on 6/12/2024 at 8:23 AM

## 2024-06-12 NOTE — PROGRESS NOTES
Care Suites Discharge Nursing Note    Patient Information  Name: Abi Lees  Age: 78 year old    Discharge Education:  Discharge instructions reviewed: Yes  Additional education/resources provided: n/a  Patient/patient representative verbalizes understanding: Yes  Patient discharging on new medications: No  Medication education completed: N/A    Discharge Plans:   Discharge location: home  Discharge ride contacted: Yes  Approximate discharge time: 1250    Discharge Criteria:  Discharge criteria met and vital signs stable: Yes    Patient Belongs:  Patient belongings returned to patient: Yes    PIV removed. L) radial site with bandaid in place, CDI, CMS intact. Pt denies any pain. Pt has tolerated PO intake, ambulated, and voided without issue. Pt will resume eliquis this evening and STOP daily 81 mg ASA per instructions from fellow. Pt will follow up with PCP regarding low hgb of 9.5. Verbal understanding from pt, spouse, and daughter of all instructions.     Lucia Aranda RN

## 2024-06-12 NOTE — PROGRESS NOTES
Care Suites Admission Nursing Note    Patient Information  Name: Abi Lees  Age: 78 year old  Reason for admission: Coronary angiogram  Care Suites arrival time: 0625    Visitor Information  Name: Walker cortez     Patient Admission/Assessment   Pre-procedure assessment complete: Yes  If abnormal assessment/labs, provider notified: Yes - labs pending, Pt mistakenly took 325 + 81 mg of asa this AM -  March notified  NPO: Yes  Medications held per instructions/orders: Yes - last eliquis dose Sunday 6/9/24 evening  Consent: deferred to MD   Patient oriented to room: Yes  Education/questions answered: Yes  Plan/other: Proceed as planned per orders    Discharge Planning  Discharge name/phone number: Walker spouse [phone]  Overnight post sedation caregiver: Walker  Discharge location: home    Lucia Aranda RN

## 2024-06-12 NOTE — DISCHARGE INSTRUCTIONS
Cardiac Angiogram Discharge Instructions - Radial    After you go home:    Have an adult stay with you until tomorrow.  Drink extra fluids for 2 days.  You may resume your normal diet.  No smoking       For 24 hours - due to the sedation you received:  Relax and take it easy.  Do NOT make any important or legal decisions.  Do NOT drive or operate machines at home or at work.  Do NOT drink alcohol.    Care of Wrist Puncture Site:    For the first 24 hrs - check the puncture site every 1-2 hours while awake.  It is normal to have soreness at the puncture site and mild tingling in your hand for up to 3 days.  Remove the bandaid after 24 hours. If there is minor oozing, apply another bandaid and remove it after 12 hours.  You may shower tomorrow.  Do NOT take a bath, or use a hot tub or pool for at least 3 days. Do NOT scrub the site. Do not use lotion or powder near the puncture site.           Activity:        For 2 days:   do not use your hand or arm to support your weight (such as rising from a chair)   do not bend your wrist (such as lifting a garage door).  do not lift more than 5 pounds or exercise your arm (such as tennis, golf or bowling).  Do NOT do any heavy activity such as exercise, lifting, or straining.     Bleeding:    If you start bleeding from the site in your wrist, sit down and press firmly on/above the site for 10 minutes.   Once bleeding stops, keep arm still for 2 hours.   Call [facility] as soon as you can.       Call 911 right away if you have heavy bleeding or bleeding that does not stop.      Medicines:    Resume your Eliquis this evening, but STOP your 81 mg daily aspirin.   Follow up with your primary care provider regarding your low hemoglobin.   If you have stopped any medicines, check with your provider about when to restart them.    Follow Up Appointments:    Follow up with [facility] Nurse Practitioner at [facility] of patient preference in 7-10 days.    Call the clinic if:    You  have a large or growing hard lump around the site.  The site is red, swollen, hot or tender.  Blood or fluid is draining from the site.  You have chills or a fever greater than 101 F (38 C).  Your arm feels numb, cool or changes color.  You have hives, a rash or unusual itching.  Any questions or concerns.          [facility] at [place]:    [phone] [facility] (7 days a week)    Or you may contact your provider via My Chart

## 2024-06-18 ENCOUNTER — TELEPHONE (OUTPATIENT)
Dept: FAMILY MEDICINE | Facility: CLINIC | Age: 79
End: 2024-06-18
Payer: COMMERCIAL

## 2024-06-18 NOTE — TELEPHONE ENCOUNTER
Patient calling, says she had stress test and angiogram per Evelyn Yanes's referral at visit in March.    Notes she had blood work done related to this and her hgb is quite low, cardiology says that likely contributing to her fatigue and advised she follow up with her PCP.    Hemoglobin   Date Value Ref Range Status   06/12/2024 9.5 (L) 11.7 - 15.7 g/dL Final   03/15/2019 12.2 11.7 - 15.7 g/dL Final   ]    She had angiogram done 6/12/24.    Routed to PCP to address plan based on cardiology findings.   Patient hopes she can address this by phone since she was seen recently and referred to cardiology by Evelyn.    Pharmacy selected if needed.    Blaire DE LEON RN  [facility] Triage

## 2024-06-20 ENCOUNTER — VIRTUAL VISIT (OUTPATIENT)
Dept: FAMILY MEDICINE | Facility: CLINIC | Age: 79
End: 2024-06-20
Payer: COMMERCIAL

## 2024-06-20 DIAGNOSIS — D64.9 NORMOCYTIC ANEMIA: Primary | ICD-10-CM

## 2024-06-20 PROCEDURE — 99442 PR PHYSICIAN TELEPHONE EVALUATION 11-20 MIN: CPT | Mod: 93 | Performed by: NURSE PRACTITIONER

## 2024-06-20 NOTE — PROGRESS NOTES
"Abi is a 78 year old who is being evaluated via a billable telephone visit.    What phone number would you like to be contacted at? [phone]  How would you like to obtain your AVS? MyWishBoardhart  Originating Location (pt. Location): Home    Distant Location (provider location):  Off-site    Assessment & Plan     Anemia, unspecified type  Previous labs reviewed.  Discussion held regarding options.  Declines CT scan of chest, mammogram, colonoscopy or fit test.  Declines additional labs at this time-states is very busy and has many things to do.  Declines iron infusion.  Is agreeable to over-the-counter iron supplement.  Educated on use and possible side effects.  Is agreeable to rechecking labs in 3 months.  Orders placed  - Iron & Iron Binding Capacity; Future  - Ferritin; Future  - CBC with Platelets & Differential; Future  - Vitamin B12; Future  - Folate; Future  - Reticulocyte count; Future           BMI  Estimated body mass index is 27.99 kg/m  as calculated from the following:    Height as of 6/12/24: 1.6 m (5' 3\").    Weight as of 6/12/24: 71.7 kg (158 lb).       Subjective   Abi is a 78 year old, presenting for the following health issues:  Fatigue      6/20/2024     9:54 AM   Additional Questions   Roomed by Patient echecked in via [facility]   Accompanied by na         6/20/2024     9:54 AM   Patient Reported Additional Medications   Patient reports taking the following new medications na     History of Present Illness       Vascular Disease:  She presents for follow up of vascular disease.     She never takes nitroglycerin. She is not taking daily aspirin.    Reason for visit:  Fatigue    She eats 0-1 servings of fruits and vegetables daily.She consumes 0 sweetened beverage(s) daily.She exercises with enough effort to increase her heart rate 9 or less minutes per day.  She exercises with enough effort to increase her heart rate 3 or less days per week.   She is taking medications regularly.         Phone " call completed for recheck of SOB.  Saw cardiology, cardiac cath unremarkable.  New atrial fibs.  Started on Eliquis.  Also found to have anemia.         Objective           Vitals:  No vitals were obtained today due to virtual visit.    Physical Exam   General: Alert and no distress //Respiratory: No audible wheeze, cough, or shortness of breath // Psychiatric:  Appropriate affect, tone, and pace of words          Phone call duration: 15 minutes  Signed Electronically by: EMORY Mcgee CNP

## 2024-07-16 ENCOUNTER — APPOINTMENT (OUTPATIENT)
Dept: URBAN - METROPOLITAN AREA CLINIC 252 | Age: 79
Setting detail: DERMATOLOGY
End: 2024-07-16

## 2024-07-16 VITALS — WEIGHT: 152 LBS | RESPIRATION RATE: 16 BRPM | HEIGHT: 63 IN

## 2024-07-16 DIAGNOSIS — L82.1 OTHER SEBORRHEIC KERATOSIS: ICD-10-CM

## 2024-07-16 DIAGNOSIS — L24 IRRITANT CONTACT DERMATITIS: ICD-10-CM

## 2024-07-16 PROBLEM — L24.9 IRRITANT CONTACT DERMATITIS, UNSPECIFIED CAUSE: Status: ACTIVE | Noted: 2024-07-16

## 2024-07-16 PROCEDURE — 99203 OFFICE O/P NEW LOW 30 MIN: CPT

## 2024-07-16 PROCEDURE — OTHER COUNSELING: OTHER

## 2024-07-16 PROCEDURE — OTHER PRESCRIPTION: OTHER

## 2024-07-16 RX ORDER — TRIAMCINOLONE ACETONIDE 1 MG/G
CREAM TOPICAL BID
Qty: 45 | Refills: 1 | Status: ERX | COMMUNITY
Start: 2024-07-16

## 2024-07-16 ASSESSMENT — LOCATION ZONE DERM: LOCATION ZONE: TRUNK

## 2024-07-16 ASSESSMENT — LOCATION DETAILED DESCRIPTION DERM
LOCATION DETAILED: RIGHT RIB CAGE
LOCATION DETAILED: LEFT RIB CAGE

## 2024-07-16 ASSESSMENT — LOCATION SIMPLE DESCRIPTION DERM: LOCATION SIMPLE: ABDOMEN

## 2024-07-16 NOTE — HPI: RASH
Sounds dehydrated from loose stools. Push fluids   Quantify amount of PO non-caffeinated and caffeinated fluids she is getting daily  How much normalyte is she taking in  Get EKG    
What Type Of Note Output Would You Prefer (Optional)?: Bullet Format
How Severe Is Your Rash?: moderate
Is This A New Presentation, Or A Follow-Up?: Rash
Additional History: Patient reports having used nystatin cream previously which did not help.\\nWorks part time increase sweating

## 2024-07-16 NOTE — PROCEDURE: COUNSELING
Patient Specific Counseling (Will Not Stick From Patient To Patient): **discussed treatment of the bothersome areas. Patient will schedule a separate appointment.
Detail Level: Zone

## 2024-07-19 NOTE — PROGRESS NOTES
~Cardiology Clinic Visit~    Primary Cardiologist: Dr. Jeffries     History of Present Illness     Abi Lees is a very pleasant 78 year old female with a past medical history notable for HTN, PAF, CAD, HLD on statin.  Note, she carries hx ARA to mLCx for 100% occlusion with 3.0x18mm Cypher stent (2006); minimal disease noted to pLAD and pRCA.  EF at that time mentioned to be at 45%     She met with Dr. Jeffries in cardiology consultation in May 2024.  She came with concerns regarding exertional dyspnea and a fluttering sensation in her chest.       The patient recently underwent a stress test for above-mentioned symptoms.  During the stress test she also developed fluttering in the chest and the EKG at the time showed SVT.  There was a question about possible A-fib.  The stress portion itself of the exam showed a small area of nontransmural infarction in the mid to basal inferolateral segments of the LF associated with a mild degree of amadou-infarct ischemia.    We completed a heart monitor, as noted below.  She then reported persistent KISER that was unchanged, as well as fluttering heart rate sensation.  She was recommended a coronary angiogram.  We discussed anticoagulation, and she was also started on OAC.     Diagnostics:  5/7/24 Echocardiogram: study shows EF 45-50% with basal to mid inferolateral and basal inferior wall hypokinesis; new finding.  Dr. Jeffries reviewed and is recommended follow up testing with coronary angiogram.  5/25/24 Zio Patch: report showed PAF/Fl at <1% burden with longest episode lasting ~4.5 minutes.  6/12/24 Coronary angiogram: No significant obstructive coronary artery disease. The prior placed stent in the large caliber OM2 is widely patent.     Interval 07/22/24    Patient feeling well.  Breathing has improved, no residual SOB.  Able to go up and down the stairs without symptoms.  On iron supplementation for anemia.  Tolerating well.  States she has had some slip ups with  her Eliquis dosing - we discussed the importance of the regimen for stroke prophylaxis.  __________________________________________________________________         Assessment and Impression:     Palpitations  Paroxysmal atrial fibrillation on remote heart monitor, on Eliquis  Exertional dyspnea-negative stress test; stable angiogram.  Essential hypertension  Hyperlipidemia-on Lipitor  Hx known coronary artery disease with past stenting of Cx, 2006         Recommendations and Plan:     No changes from a cardiac standpoint today.  Follow BP at home.  Follow up with Dr. Jeffries in 1-year.  Prescription refills sent.  __________________________________________________________________    Thank you for the opportunity to participate in this pleasant patient's care.    We would be happy to see this patient sooner for any concerns in the meantime.    EMORY Crain, CNP   Nurse Practitioner  [facility]    Today's clinic visit entailed:  The following tests were independently interpreted by me as noted in my documentation: cath, labs  Prescription drug management  The level of medical decision making during this visit was of moderate complexity.  Review of the result(s) of each unique test - cardiac testing, cardiac imaging, labs  Care everywhere reviewed for additional records to facilitate comprehensive patient care.    Orders this Visit:  Orders Placed This Encounter   Procedures    Follow-Up with Cardiology     Orders Placed This Encounter   Medications    apixaban ANTICOAGULANT (ELIQUIS ANTICOAGULANT) 5 MG tablet     Sig: Take 1 tablet (5 mg) by mouth 2 times daily     Dispense:  180 tablet     Refill:  4    metoprolol tartrate (LOPRESSOR) 50 MG tablet     Sig: Take 1 tablet (50 mg) by mouth 2 times daily     Dispense:  180 tablet     Refill:  4    atorvastatin (LIPITOR) 40 MG tablet     Sig: Take 1 tablet (40 mg) by mouth daily     Dispense:  90 tablet     Refill:  4    hydrochlorothiazide  (HYDRODIURIL) 25 MG tablet     Sig: Take 1 tablet (25 mg) by mouth daily     Dispense:  90 tablet     Refill:  4    lisinopril (ZESTRIL) 40 MG tablet     Sig: Take 1 tablet (40 mg) by mouth daily     Dispense:  90 tablet     Refill:  4     Medications Discontinued During This Encounter   Medication Reason    hydrochlorothiazide (HYDRODIURIL) 25 MG tablet     lisinopril (ZESTRIL) 40 MG tablet     metoprolol tartrate (LOPRESSOR) 50 MG tablet     atorvastatin (LIPITOR) 40 MG tablet     apixaban ANTICOAGULANT (ELIQUIS ANTICOAGULANT) 5 MG tablet Reorder (No AVS)     Encounter Diagnoses   Name Primary?    Coronary artery disease involving native coronary artery of native heart without angina pectoris Yes    Paroxysmal atrial fibrillation (H)     Essential hypertension     Hyperlipidemia LDL goal <70     Essential hypertension with goal blood pressure less than 140/90      CURRENT MEDICATIONS:  Current Outpatient Medications   Medication Sig Dispense Refill    apixaban ANTICOAGULANT (ELIQUIS ANTICOAGULANT) 5 MG tablet Take 1 tablet (5 mg) by mouth 2 times daily 180 tablet 4    atorvastatin (LIPITOR) 40 MG tablet Take 1 tablet (40 mg) by mouth daily 90 tablet 4    Cholecalciferol (VITAMIN D-3 PO)       gabapentin (NEURONTIN) 300 MG capsule Take 1 capsule 3 times per day, take 2 capsules at bedtime 450 capsule 3    hydrochlorothiazide (HYDRODIURIL) 25 MG tablet Take 1 tablet (25 mg) by mouth daily 90 tablet 4    lisinopril (ZESTRIL) 40 MG tablet Take 1 tablet (40 mg) by mouth daily 90 tablet 4    magnesium 100 MG CAPS Take 1 capsule by mouth daily      metoprolol tartrate (LOPRESSOR) 50 MG tablet Take 1 tablet (50 mg) by mouth 2 times daily 180 tablet 4    Multiple Vitamins-Minerals (PRESERVISION AREDS 2 PO) Take 1 capsule by mouth daily      nystatin (MYCOSTATIN) 865024 UNIT/GM external cream APPLY TO AFFECTED AREA TWICE PER DAY AS NEEDED. 30 g 1    potassium 99 MG TABS Take 1 tablet by mouth daily       ALLERGIES   No  "Known Allergies  PAST MEDICAL, SURGICAL, FAMILY, SOCIAL HISTORY:  History was reviewed and updated as needed, see medical record.    Review of Systems:  A 10-point Review Of Systems is otherwise normal except for that which is noted in the HPI and interval summary.    Physical Exam:    Vitals: BP (!) 144/72   Pulse 56   Resp 16   Ht 1.6 m (5' 3\")   Wt 70.4 kg (155 lb 1.6 oz)   SpO2 96%   BMI 27.47 kg/m    Constitutional: Appears stated age, well nourished, NAD.  Neck: Supple. Carotid pulses full and equal.   Respiratory: Non-labored. Lungs CTAB.  Cardiovascular: RRR, normal S1 and S2. No M/G/R.  Trace ankle edema.  GI: Soft, non-distended, non-tender.  Skin: Warm and dry.   Musculoskeletal/Extremities: Symmetrical movement.  Neurologic: No gross focal deficits. Alert, awake.  Psychiatric: Affect appropriate. Mentation normal.    Recent Lab Results:  LIPID RESULTS:  Lab Results   Component Value Date    CHOL 174 03/27/2024    CHOL 180 07/09/2018    HDL 43 (L) 03/27/2024    HDL 60 07/09/2018     (H) 03/27/2024    LDL 96 07/09/2018    TRIG 145 03/27/2024    TRIG 120 07/09/2018     LIVER ENZYME RESULTS:  Lab Results   Component Value Date    AST 21 07/16/2021    AST 27 07/09/2018    ALT 24 07/16/2021    ALT 24 07/09/2018     CBC RESULTS:  Lab Results   Component Value Date    WBC 8.4 06/12/2024    WBC 8.2 03/15/2016    RBC 4.06 06/12/2024    RBC 4.33 03/15/2016    HGB 9.5 (L) 06/12/2024    HGB 12.2 03/15/2019    HCT 32.3 (L) 06/12/2024    HCT 38.6 03/15/2016    MCV 80 06/12/2024    MCV 89 03/15/2016    MCH 23.4 (L) 06/12/2024    MCH 30.5 03/15/2016    MCHC 29.4 (L) 06/12/2024    MCHC 34.2 03/15/2016    RDW 16.7 (H) 06/12/2024    RDW 12.6 03/15/2016     06/12/2024     03/15/2016     BMP RESULTS:  Lab Results   Component Value Date     06/12/2024     03/15/2019    POTASSIUM 3.9 06/12/2024    POTASSIUM 3.5 11/21/2022    POTASSIUM 3.9 03/15/2019    CHLORIDE 106 06/12/2024    CHLORIDE " "105 11/21/2022    CHLORIDE 103 03/15/2019    CO2 26 06/12/2024    CO2 26 11/21/2022    CO2 29 03/15/2019    ANIONGAP 11 06/12/2024    ANIONGAP 8 11/21/2022    ANIONGAP 6 03/15/2019     (H) 06/12/2024     (H) 11/21/2022     (H) 03/15/2019    BUN 23.2 (H) 06/12/2024    BUN 21 11/21/2022    BUN 25 03/15/2019    CR 0.99 (H) 06/12/2024    CR 0.86 03/15/2019    GFRESTIMATED 58 (L) 06/12/2024    GFRESTIMATED 67 03/15/2019    GFRESTBLACK 77 03/15/2019    ELIJAH 9.5 06/12/2024    ELIJAH 9.3 03/15/2019      A1C RESULTS:  No results found for: \"A1C\"  INR RESULTS:  Lab Results   Component Value Date    INR 1.03 06/12/2024                     "

## 2024-07-22 ENCOUNTER — OFFICE VISIT (OUTPATIENT)
Dept: CARDIOLOGY | Facility: CLINIC | Age: 79
End: 2024-07-22
Attending: INTERNAL MEDICINE
Payer: COMMERCIAL

## 2024-07-22 VITALS
OXYGEN SATURATION: 96 % | HEART RATE: 56 BPM | HEIGHT: 63 IN | RESPIRATION RATE: 16 BRPM | SYSTOLIC BLOOD PRESSURE: 144 MMHG | BODY MASS INDEX: 27.48 KG/M2 | DIASTOLIC BLOOD PRESSURE: 72 MMHG | WEIGHT: 155.1 LBS

## 2024-07-22 DIAGNOSIS — I48.0 PAROXYSMAL ATRIAL FIBRILLATION (H): ICD-10-CM

## 2024-07-22 DIAGNOSIS — E78.5 HYPERLIPIDEMIA LDL GOAL <70: ICD-10-CM

## 2024-07-22 DIAGNOSIS — I10 ESSENTIAL HYPERTENSION: ICD-10-CM

## 2024-07-22 DIAGNOSIS — I25.10 CORONARY ARTERY DISEASE INVOLVING NATIVE CORONARY ARTERY OF NATIVE HEART WITHOUT ANGINA PECTORIS: Primary | ICD-10-CM

## 2024-07-22 DIAGNOSIS — I10 ESSENTIAL HYPERTENSION WITH GOAL BLOOD PRESSURE LESS THAN 140/90: ICD-10-CM

## 2024-07-22 PROCEDURE — 99214 OFFICE O/P EST MOD 30 MIN: CPT | Performed by: NURSE PRACTITIONER

## 2024-07-22 RX ORDER — METOPROLOL TARTRATE 50 MG
50 TABLET ORAL 2 TIMES DAILY
Qty: 180 TABLET | Refills: 4 | Status: SHIPPED | OUTPATIENT
Start: 2024-07-22

## 2024-07-22 RX ORDER — HYDROCHLOROTHIAZIDE 25 MG/1
25 TABLET ORAL DAILY
Qty: 90 TABLET | Refills: 4 | Status: SHIPPED | OUTPATIENT
Start: 2024-07-22

## 2024-07-22 RX ORDER — ATORVASTATIN CALCIUM 40 MG/1
40 TABLET, FILM COATED ORAL DAILY
Qty: 90 TABLET | Refills: 4 | Status: SHIPPED | OUTPATIENT
Start: 2024-07-22

## 2024-07-22 RX ORDER — LISINOPRIL 40 MG/1
40 TABLET ORAL DAILY
Qty: 90 TABLET | Refills: 4 | Status: SHIPPED | OUTPATIENT
Start: 2024-07-22

## 2024-07-22 NOTE — LETTER
7/22/2024    Evelyn Yanes, APRN CNP  [address] [facility] [place] [address]    RE: Abi Weberde       Dear Colleague,     I had the pleasure of seeing Abi Lees in the [facility].              ~Cardiology Clinic Visit~    Primary Cardiologist: Dr. Jeffries     History of Present Illness     Abi Lees is a very pleasant 78 year old female with a past medical history notable for HTN, PAF, CAD, HLD on statin.  Note, she carries hx ARA to mLCx for 100% occlusion with 3.0x18mm Cypher stent (2006); minimal disease noted to pLAD and pRCA.  EF at that time mentioned to be at 45%     She met with Dr. Jeffries in cardiology consultation in May 2024.  She came with concerns regarding exertional dyspnea and a fluttering sensation in her chest.       The patient recently underwent a stress test for above-mentioned symptoms.  During the stress test she also developed fluttering in the chest and the EKG at the time showed SVT.  There was a question about possible A-fib.  The stress portion itself of the exam showed a small area of nontransmural infarction in the mid to basal inferolateral segments of the LF associated with a mild degree of amadou-infarct ischemia.    We completed a heart monitor, as noted below.  She then reported persistent KISER that was unchanged, as well as fluttering heart rate sensation.  She was recommended a coronary angiogram.  We discussed anticoagulation, and she was also started on OAC.     Diagnostics:  5/7/24 Echocardiogram: study shows EF 45-50% with basal to mid inferolateral and basal inferior wall hypokinesis; new finding.  Dr. Jeffries reviewed and is recommended follow up testing with coronary angiogram.  5/25/24 Zio Patch: report showed PAF/Fl at <1% burden with longest episode lasting ~4.5 minutes.  6/12/24 Coronary angiogram: No significant obstructive coronary artery disease. The prior placed stent in the large caliber OM2 is widely patent.     Interval  07/22/24    Patient feeling well.  Breathing has improved, no residual SOB.  Able to go up and down the stairs without symptoms.  On iron supplementation for anemia.  Tolerating well.  States she has had some slip ups with her Eliquis dosing - we discussed the importance of the regimen for stroke prophylaxis.  __________________________________________________________________         Assessment and Impression:     Palpitations  Paroxysmal atrial fibrillation on remote heart monitor, on Eliquis  Exertional dyspnea-negative stress test; stable angiogram.  Essential hypertension  Hyperlipidemia-on Lipitor  Hx known coronary artery disease with past stenting of Cx, 2006         Recommendations and Plan:     No changes from a cardiac standpoint today.  Follow BP at home.  Follow up with Dr. Jeffries in 1-year.  Prescription refills sent.  __________________________________________________________________    Thank you for the opportunity to participate in this pleasant patient's care.    We would be happy to see this patient sooner for any concerns in the meantime.    EMORY Crain, CNP   Nurse Practitioner  [facility]    Today's clinic visit entailed:  The following tests were independently interpreted by me as noted in my documentation: cath, labs  Prescription drug management  The level of medical decision making during this visit was of moderate complexity.  Review of the result(s) of each unique test - cardiac testing, cardiac imaging, labs  Care everywhere reviewed for additional records to facilitate comprehensive patient care.    Orders this Visit:  Orders Placed This Encounter   Procedures    Follow-Up with Cardiology     Orders Placed This Encounter   Medications    apixaban ANTICOAGULANT (ELIQUIS ANTICOAGULANT) 5 MG tablet     Sig: Take 1 tablet (5 mg) by mouth 2 times daily     Dispense:  180 tablet     Refill:  4    metoprolol tartrate (LOPRESSOR) 50 MG tablet     Sig: Take 1  tablet (50 mg) by mouth 2 times daily     Dispense:  180 tablet     Refill:  4    atorvastatin (LIPITOR) 40 MG tablet     Sig: Take 1 tablet (40 mg) by mouth daily     Dispense:  90 tablet     Refill:  4    hydrochlorothiazide (HYDRODIURIL) 25 MG tablet     Sig: Take 1 tablet (25 mg) by mouth daily     Dispense:  90 tablet     Refill:  4    lisinopril (ZESTRIL) 40 MG tablet     Sig: Take 1 tablet (40 mg) by mouth daily     Dispense:  90 tablet     Refill:  4     Medications Discontinued During This Encounter   Medication Reason    hydrochlorothiazide (HYDRODIURIL) 25 MG tablet     lisinopril (ZESTRIL) 40 MG tablet     metoprolol tartrate (LOPRESSOR) 50 MG tablet     atorvastatin (LIPITOR) 40 MG tablet     apixaban ANTICOAGULANT (ELIQUIS ANTICOAGULANT) 5 MG tablet Reorder (No AVS)     Encounter Diagnoses   Name Primary?    Coronary artery disease involving native coronary artery of native heart without angina pectoris Yes    Paroxysmal atrial fibrillation (H)     Essential hypertension     Hyperlipidemia LDL goal <70     Essential hypertension with goal blood pressure less than 140/90      CURRENT MEDICATIONS:  Current Outpatient Medications   Medication Sig Dispense Refill    apixaban ANTICOAGULANT (ELIQUIS ANTICOAGULANT) 5 MG tablet Take 1 tablet (5 mg) by mouth 2 times daily 180 tablet 4    atorvastatin (LIPITOR) 40 MG tablet Take 1 tablet (40 mg) by mouth daily 90 tablet 4    Cholecalciferol (VITAMIN D-3 PO)       gabapentin (NEURONTIN) 300 MG capsule Take 1 capsule 3 times per day, take 2 capsules at bedtime 450 capsule 3    hydrochlorothiazide (HYDRODIURIL) 25 MG tablet Take 1 tablet (25 mg) by mouth daily 90 tablet 4    lisinopril (ZESTRIL) 40 MG tablet Take 1 tablet (40 mg) by mouth daily 90 tablet 4    magnesium 100 MG CAPS Take 1 capsule by mouth daily      metoprolol tartrate (LOPRESSOR) 50 MG tablet Take 1 tablet (50 mg) by mouth 2 times daily 180 tablet 4    Multiple Vitamins-Minerals (PRESERVISION AREDS  "2 PO) Take 1 capsule by mouth daily      nystatin (MYCOSTATIN) 744615 UNIT/GM external cream APPLY TO AFFECTED AREA TWICE PER DAY AS NEEDED. 30 g 1    potassium 99 MG TABS Take 1 tablet by mouth daily       ALLERGIES   No Known Allergies  PAST MEDICAL, SURGICAL, FAMILY, SOCIAL HISTORY:  History was reviewed and updated as needed, see medical record.    Review of Systems:  A 10-point Review Of Systems is otherwise normal except for that which is noted in the HPI and interval summary.    Physical Exam:    Vitals: BP (!) 144/72   Pulse 56   Resp 16   Ht 1.6 m (5' 3\")   Wt 70.4 kg (155 lb 1.6 oz)   SpO2 96%   BMI 27.47 kg/m    Constitutional: Appears stated age, well nourished, NAD.  Neck: Supple. Carotid pulses full and equal.   Respiratory: Non-labored. Lungs CTAB.  Cardiovascular: RRR, normal S1 and S2. No M/G/R.  Trace ankle edema.  GI: Soft, non-distended, non-tender.  Skin: Warm and dry.   Musculoskeletal/Extremities: Symmetrical movement.  Neurologic: No gross focal deficits. Alert, awake.  Psychiatric: Affect appropriate. Mentation normal.    Recent Lab Results:  LIPID RESULTS:  Lab Results   Component Value Date    CHOL 174 03/27/2024    CHOL 180 07/09/2018    HDL 43 (L) 03/27/2024    HDL 60 07/09/2018     (H) 03/27/2024    LDL 96 07/09/2018    TRIG 145 03/27/2024    TRIG 120 07/09/2018     LIVER ENZYME RESULTS:  Lab Results   Component Value Date    AST 21 07/16/2021    AST 27 07/09/2018    ALT 24 07/16/2021    ALT 24 07/09/2018     CBC RESULTS:  Lab Results   Component Value Date    WBC 8.4 06/12/2024    WBC 8.2 03/15/2016    RBC 4.06 06/12/2024    RBC 4.33 03/15/2016    HGB 9.5 (L) 06/12/2024    HGB 12.2 03/15/2019    HCT 32.3 (L) 06/12/2024    HCT 38.6 03/15/2016    MCV 80 06/12/2024    MCV 89 03/15/2016    MCH 23.4 (L) 06/12/2024    MCH 30.5 03/15/2016    MCHC 29.4 (L) 06/12/2024    MCHC 34.2 03/15/2016    RDW 16.7 (H) 06/12/2024    RDW 12.6 03/15/2016     06/12/2024     " "03/15/2016     BMP RESULTS:  Lab Results   Component Value Date     06/12/2024     03/15/2019    POTASSIUM 3.9 06/12/2024    POTASSIUM 3.5 11/21/2022    POTASSIUM 3.9 03/15/2019    CHLORIDE 106 06/12/2024    CHLORIDE 105 11/21/2022    CHLORIDE 103 03/15/2019    CO2 26 06/12/2024    CO2 26 11/21/2022    CO2 29 03/15/2019    ANIONGAP 11 06/12/2024    ANIONGAP 8 11/21/2022    ANIONGAP 6 03/15/2019     (H) 06/12/2024     (H) 11/21/2022     (H) 03/15/2019    BUN 23.2 (H) 06/12/2024    BUN 21 11/21/2022    BUN 25 03/15/2019    CR 0.99 (H) 06/12/2024    CR 0.86 03/15/2019    GFRESTIMATED 58 (L) 06/12/2024    GFRESTIMATED 67 03/15/2019    GFRESTBLACK 77 03/15/2019    ELIJAH 9.5 06/12/2024    ELIJAH 9.3 03/15/2019      A1C RESULTS:  No results found for: \"A1C\"  INR RESULTS:  Lab Results   Component Value Date    INR 1.03 06/12/2024                     Thank you for allowing me to participate in the care of your patient.      Sincerely,     EMORY Crain [facility]  cc:   Rashad Jeffries MD  [address]  [place] [address]      "

## 2024-09-17 ENCOUNTER — LAB (OUTPATIENT)
Dept: LAB | Facility: CLINIC | Age: 79
End: 2024-09-17
Payer: COMMERCIAL

## 2024-09-17 DIAGNOSIS — D64.9 NORMOCYTIC ANEMIA: ICD-10-CM

## 2024-09-17 LAB
BASOPHILS # BLD AUTO: 0.1 10E3/UL (ref 0–0.2)
BASOPHILS NFR BLD AUTO: 1 %
EOSINOPHIL # BLD AUTO: 0.1 10E3/UL (ref 0–0.7)
EOSINOPHIL NFR BLD AUTO: 1 %
ERYTHROCYTE [DISTWIDTH] IN BLOOD BY AUTOMATED COUNT: 15.9 % (ref 10–15)
FERRITIN SERPL-MCNC: 136 NG/ML (ref 11–328)
FOLATE SERPL-MCNC: 38.7 NG/ML (ref 4.6–34.8)
HCT VFR BLD AUTO: 40.2 % (ref 35–47)
HGB BLD-MCNC: 13.3 G/DL (ref 11.7–15.7)
IMM GRANULOCYTES # BLD: 0 10E3/UL
IMM GRANULOCYTES NFR BLD: 0 %
IRON BINDING CAPACITY (ROCHE): 234 UG/DL (ref 240–430)
IRON SATN MFR SERPL: 31 % (ref 15–46)
IRON SERPL-MCNC: 72 UG/DL (ref 37–145)
LYMPHOCYTES # BLD AUTO: 1.8 10E3/UL (ref 0.8–5.3)
LYMPHOCYTES NFR BLD AUTO: 22 %
MCH RBC QN AUTO: 29.6 PG (ref 26.5–33)
MCHC RBC AUTO-ENTMCNC: 33.1 G/DL (ref 31.5–36.5)
MCV RBC AUTO: 89 FL (ref 78–100)
MONOCYTES # BLD AUTO: 0.5 10E3/UL (ref 0–1.3)
MONOCYTES NFR BLD AUTO: 6 %
NEUTROPHILS # BLD AUTO: 5.7 10E3/UL (ref 1.6–8.3)
NEUTROPHILS NFR BLD AUTO: 71 %
PLATELET # BLD AUTO: 169 10E3/UL (ref 150–450)
RBC # BLD AUTO: 4.5 10E6/UL (ref 3.8–5.2)
RETICS # AUTO: 0.06 10E6/UL
RETICS/RBC NFR AUTO: 1.3 %
WBC # BLD AUTO: 8 10E3/UL (ref 4–11)

## 2024-09-17 PROCEDURE — 85045 AUTOMATED RETICULOCYTE COUNT: CPT

## 2024-09-17 PROCEDURE — 83550 IRON BINDING TEST: CPT

## 2024-09-17 PROCEDURE — 82607 VITAMIN B-12: CPT

## 2024-09-17 PROCEDURE — 36415 COLL VENOUS BLD VENIPUNCTURE: CPT

## 2024-09-17 PROCEDURE — 82746 ASSAY OF FOLIC ACID SERUM: CPT

## 2024-09-17 PROCEDURE — 83540 ASSAY OF IRON: CPT

## 2024-09-17 PROCEDURE — 85025 COMPLETE CBC W/AUTO DIFF WBC: CPT

## 2024-09-17 PROCEDURE — 82728 ASSAY OF FERRITIN: CPT

## 2024-09-18 LAB — VIT B12 SERPL-MCNC: 410 PG/ML (ref 232–1245)

## 2024-12-04 ENCOUNTER — OFFICE VISIT (OUTPATIENT)
Dept: FAMILY MEDICINE | Facility: CLINIC | Age: 79
End: 2024-12-04
Payer: COMMERCIAL

## 2024-12-04 VITALS
RESPIRATION RATE: 20 BRPM | HEIGHT: 63 IN | HEART RATE: 66 BPM | OXYGEN SATURATION: 100 % | DIASTOLIC BLOOD PRESSURE: 80 MMHG | SYSTOLIC BLOOD PRESSURE: 132 MMHG | BODY MASS INDEX: 24.73 KG/M2 | TEMPERATURE: 97.3 F | WEIGHT: 139.6 LBS

## 2024-12-04 DIAGNOSIS — M25.511 CHRONIC RIGHT SHOULDER PAIN: ICD-10-CM

## 2024-12-04 DIAGNOSIS — N63.22 MASS OF UPPER INNER QUADRANT OF LEFT BREAST: Primary | ICD-10-CM

## 2024-12-04 DIAGNOSIS — G89.29 CHRONIC RIGHT SHOULDER PAIN: ICD-10-CM

## 2024-12-04 PROCEDURE — G2211 COMPLEX E/M VISIT ADD ON: HCPCS | Performed by: NURSE PRACTITIONER

## 2024-12-04 PROCEDURE — 99214 OFFICE O/P EST MOD 30 MIN: CPT | Performed by: NURSE PRACTITIONER

## 2024-12-04 NOTE — PATIENT INSTRUCTIONS
You can call imaging scheduling to set up appointment date, time, and location that works best for you to have mammogram and ultrasound [phone]    Please go to the pharmacy to receive your tetanus (Tdap) vaccine.     Please go to the pharmacy for your RSV (respiratory syncytial virus)vaccine.  You will need 1 dose of the RSV vaccine, 1 time. The RSV vaccine can prevent lower respiratory tract disease caused by respiratory syncytial virus (RSV). RSV is a common respiratory virus that usually causes mild, cold-like symptoms. RSV can cause illness in people of all ages but may be especially serious for infants and older adults. Adults at highest risk for severe RSV disease include older adults, adults with chronic medical conditions such as heart or lung disease, weakened immune systems, or certain other underlying medical conditions, or who live in nursing homes or long-term care facilities    Please go to the pharmacy for your pneumonia vaccine, Prevnar 20.  This vaccine helps protect against 20 types strains of streptococcal pneumonia that can cause serious infections in adults-pneumonia.  After the vaccine it is common to have some injection site redness, warmth and mild swelling.  This should resolve on its own after 24 to 48 hours.  Applying a cool compress to the site can help to ease the discomfort.  Also, you may feel a bit tired and rundown for 28 to 48 hours after receiving the injection.  If you are over age 65 you only need to get this vaccine once.  If you are under age 65 this vaccine should be repeated in 5 years.    Please go to the pharmacy for your RSV (respiratory syncytial virus)vaccine.  You will need 1 dose of the RSV vaccine, 1 time. The RSV vaccine can prevent lower respiratory tract disease caused by respiratory syncytial virus (RSV). RSV is a common respiratory virus that usually causes mild, cold-like symptoms. RSV can cause illness in people of all ages but may be especially serious  for infants and older adults. Adults at highest risk for severe RSV disease include older adults, adults with chronic medical conditions such as heart or lung disease, weakened immune systems, or certain other underlying medical conditions, or who live in nursing homes or long-term care facilities

## 2024-12-04 NOTE — PROGRESS NOTES
Assessment & Plan     Mass of upper inner quadrant of left breast  Order placed, plans to set up per self  Full plan will depend on outcome.  - MA Diagnostic Digital Left; Future  - US Breast Left Complete 4 Quadrants; Future    Chronic right shoulder pain  Previous imaging and MRI reviewed.  Recommend physical therapy and evaluation by orthopedics.  - Physical Therapy  Referral; Future  - Orthopedic  Referral; Future     The longitudinal plan of care for the diagnosis(es)/condition(s) as documented were addressed during this visit. Due to the added complexity in care, I will continue to support Abi in the subsequent management and with ongoing continuity of care.     Subjective   Abi is a 79 year old, presenting for the following health issues:  Breast Problem        12/4/2024     8:48 AM   Additional Questions   Roomed by Clarissa CELESTE         12/4/2024     8:48 AM   Patient Reported Additional Medications   Patient reports taking the following new medications None per patient     History of Present Illness       Reason for visit:  Fell and have a  lump in my left breast. Would also request a referral for an  MRI for my  right  shoulder.   She is taking medications regularly.       Right shoulder is hurting for some time about 2 years ago tripped and fell into his siding.  Right shoulder has been hurting worse ever since.  Has been going to chiropractor.  Had MRI of right shoulder in 2020.  Wondering if needs updated MRI.  Has never been to physical therapy or seeing orthopedics.    This summer was working in garden.  [place] so there are pulls to hold onto so does not lose balance, fell against one of the poles.  Hit left breast.  Left breast and nipple was black and blue.  There was a large lump in breast gradually got smaller.  Lump remains, is staying about the same size now.  No drainage from nipple.  No pain to left breast.  Does have some residual skin changes from where  "bruising was.          Objective    /80   Pulse 66   Temp 97.3  F (36.3  C) (Temporal)   Resp 20   Ht 1.6 m (5' 2.99\")   Wt 63.3 kg (139 lb 9.6 oz)   SpO2 100%   BMI 24.74 kg/m    Body mass index is 24.74 kg/m .  Physical Exam  Constitutional:       Appearance: She is well-developed.   Cardiovascular:      Rate and Rhythm: Normal rate and regular rhythm.   Pulmonary:      Effort: Pulmonary effort is normal.      Breath sounds: Normal breath sounds.   Chest:       Musculoskeletal:      Right shoulder: Tenderness and bony tenderness present. No swelling or deformity. Decreased range of motion. Decreased strength. Normal pulse.   Skin:     General: Skin is warm.   Neurological:      Mental Status: She is alert.   Psychiatric:         Mood and Affect: Mood normal.                 Signed Electronically by: EMORY Mcgee CNP    "

## 2024-12-06 NOTE — PROGRESS NOTES
ASSESSMENT & PLAN    Abi was seen today for pain.    Diagnoses and all orders for this visit:    History of rotator cuff tear  -     Large Joint Injection/Arthocentesis: R subacromial bursa    Chronic right shoulder pain  -     Orthopedic  Referral  -     XR Shoulder Right G/E 3 Views; Future  -     Large Joint Injection/Arthocentesis: R subacromial bursa      Aggravation of chronic issue. Suspect advanced cuff tearing, some cuff arthropathy.  Interested in injection today, to start. Depending on course, consider imaging-guided GH joint injection, updated MRI, PT, possibly future referral to ortho surgery if ongoing issues.  See below.  Questions answered. Discussed signs and symptoms that may indicate more serious issues; the patient was instructed to seek appropriate care if noted. Abi indicates understanding of these issues and agrees with the plan.        See Patient Instructions  Patient Instructions   Current right shoulder findings with pain and dysfunction, along with bruising likely related to advancing rotator cuff tearing.  Previous right shoulder MRI in 2020 demonstrated areas of rotator cuff tearing.  X-rays today show elevation of the humeral head relative the glenoid, likely indicative of chronic rotator cuff tear.  We discussed considerations around new MRI, referral to physical therapy, trial of steroid injection for pain relief.  Following discussion, right shoulder subacromial steroid injection done today.  If not getting desired relief from this, alternative injection target likely would be the glenohumeral (shoulder) joint, done with imaging guidance (referral to one of my colleagues for use ultrasound).  Otherwise, future considerations could include MRI, PT, depending on course.  Monitor at least 2 weeks to begin, with the injection done today.  If improving, follow-up is open-ended.    If you have any further questions for your physician or physician s care team you can contact  them thru [facility] or by calling [phone].        Injection Discharge Instructions    You may shower, however avoid swimming, tub baths or hot tubs for 24 hours following your procedure  You may have a mild to moderate increase in pain for several days following the injection.  It may take up to 14 days for the steroid medication to start working although you may feel the effect as early as a few days after the procedure.  You may use ice packs for 10-15 minutes, 3 to 4 times a day at the injection site for comfort  You may use anti-inflammatory medications (such as Ibuprofen or Aleve or Advil) if you are able to take safely, or acetaminophen (Tylenol) for pain control if necessary  If you were fasting, you may resume your normal diet and medications after the procedure  If you have diabetes, check your blood sugar more frequently than usual as your blood sugar may be higher than normal for 10-14 days following a steroid injection. Contact your doctor who manages your diabetes if your blood sugar is higher than usual    If you experience any of the following, call the clinic @ [phone]  - Fever over 100 degrees F  - Swelling, bleeding, redness, drainage, warmth at the injection site  - New or worsening pain      [facility]      CC: Evelyn Yanes      -----  Chief Complaint   Patient presents with    Right Shoulder - Pain       SUBJECTIVE  Abi Lees is a/an 79 year old female who is seen in consultation at the request of  Evelyn Yanes C.N.P. for evaluation of right shoulder.     The patient is seen with their .  The patient is Right handed    Onset: 2 years(s) ago. Patient describes injury as tripping and landing on the shoulder, having pain since  Location of Pain: right shoulder, diffuse shoulder pain  Worsened by: Flexion abduction  Better with:   Treatments tried: chiropractic  Associated symptoms: bruising along  "axilla    Orthopedic/Surgical history: NO  Social History/Occupation: Retired    Notes MRI from [facility], attached below. Done 7/24/20.    **  Above information per rooming staff.  Additional history:  Ongoing right shoulder issues. Has done chiropractic care in past with some benefit.  However, more recently has not had benefit from chiropractic care.  Noted recent bruising right upper arm ~4 days ago, also some lateral shoulder, right upper chest.  Is on eliquis, but not a new medication, and has not had bruising related to medication previously.        REVIEW OF SYSTEMS:  Review of Systems    OBJECTIVE:  Ht 1.6 m (5' 3\")   Wt 63 kg (139 lb)   BMI 24.62 kg/m           Right Shoulder exam    ROM:      forward flexion ~45        abduction ~55       internal rotation no pain with belly press       external rotation mild limitation with pain  Pain with flexion, abduction    Tender: anterior shoulder    Strength:      abduction 4+/5       internal rotation 4+/5       external rotation 3/5  Pain with ER    Impingement testing:      min pain with Treadwell       Some pain with empty can    Skin:      well perfused       capillary refill brisk  Area of ecchymosis anterior axilla, proximal and lateral upper arm/shoulder    Sensation:      normal sensation over shoulder and upper extremity       RADIOLOGY:  Final results and radiologist's interpretation, available in the [facility] health record.  Images were reviewed with the patient in the office today.  My personal interpretation of the performed imaging: narrowing acromiohumeral space, elevation of humeral head relative to glenoid likely represents cuff arthropathy. Mild inferior humeral head spur. No clear acute bony abnormality noted.        Recent Results (from the past 24 hours)   XR Shoulder Right G/E 3 Views    Narrative    RIGHT SHOULDER THREE OR MORE VIEWS  12/10/2024 10:17 AM    INDICATION: Right shoulder pain.    COMPARISON: 3/29/2019      Impression    IMPRESSION: New " superior migration of the humeral head narrows the  subacromial space and can be seen with rotator cuff tearing. No acute  displaced right shoulder fracture or dislocation. Suspect interval  distal right clavicle resection. Progressive mild right glenohumeral  joint osteoarthritis with inferior glenohumeral spurring. Subacromial  spur. Diffuse bone demineralization. Degenerative change in the  visualized spine. Visualized right lung is grossly clear.    LISA FULLER MD         SYSTEM ID:  [id number]             =====================    Previous right shoulder MRI visualized/reviewed:  Full thickness cuff tearing.        [facility]  [address] [facility] [address]  Phone: [phone]  Fax: [phone]    Referring Physician Information:  Prosper Muñoz M.D.  [address] [facility] [address]  Phone: [phone]  Fax: [phone]  Patient: Abi Lees YOB: 1945  Sex: Female  Phone: [phone]    [facility] MRN: [id number]  Exam Date: 07/24/2020  EXAM: MRI of the RIGHT SHOULDER, without contrast    CLINICAL INFORMATION: Female, 74 years old, with right shoulder pain for one year after fall injury.    INDICATION: Evaluate for rotator cuff tear.    PRIOR SURGERY: None reported.    PLAIN FILMS: None available.    COMPARISONS: No prior MRIs available.    TECHNICAL INFORMATION: Using a 1.2T MR scanner and a localizing surface coil:    Coronals: PD, T2FS    Sagittals: PDFS, T2    Axials: PD, PDFS    SEDATION: None    CONTRAST: None    FINDINGS:    Bones:    Proximal humerus: No fracture or marrow edema/pathology. No humeral Hill-Sachs or reverse Hill-Sachs lesion/impaction or contusion.    Glenoid: No fracture or marrow edema/pathology. No osseous Bankart lesion.    Rotator cuff and muscles/tendons:    Supraspinatus: Distal supraspinatus tendon fibers are attenuated and irregular in appearance. There is suggested partial thickness undersurface tearing of the  posterior fibers with retraction to the level of the rotator cable (coronal series 9 image 12). No full-thickness tendon tearing or retraction. Moderate muscle belly fatty atrophy.    Infraspinatus: There is appearance of full-thickness, partial width tearing of the infraspinatus at the distal insertional footprint measuring 1.4 cm in AP dimension, with delamination and differential retraction of torn tendon fibers to the level of the medial humeral head (coronal series 9 image 10). Few of the inferior tendon fibers remain attached on the greater tuberosity (sagittal series 10 image 8-15). Severe muscle belly fatty atrophy    Teres minor: No tendinopathy, tear or atrophy.    Subscapularis: Mild subscapularis tendinopathy without well-defined appearance of partial or full-thickness rotator cuff tear. No muscle volume loss or fatty infiltration.    Deltoid: No strain or atrophy.    Coracoacromial arch:    Acromion morphology: The acromion has type II morphology. Prominent appearance of broad-based subacromial osseous spur measuring 11 x 6 mm (coronal series 8 image 17, and sagittal series 10 image 13).    Acromiohumeral space: The acromiohumeral space is narrowed, measuring 4 mm..    Coracohumeral space: The coracohumeral space is within normal limits.    Acromioclavicular joint:    Joint: There is appearance of widening of the acromioclavicular joint, which in the absence of prior surgery may suggest remote AC joint trauma.    Ligaments: Coracoclavicular ligaments appear intact and relatively thickened, also possibly reflecting residua of chronic injury.    Bursae:    Subacromial-subdeltoid: No convincing subacromial bursal thickening/bursitis.    Subcoracoid: No convincing subcoracoid bursal thickening/bursitis.    Biceps tendon: The long head of the biceps tendon is present within the bicipital groove. Intra-articular long head biceps tendon appears prominently attenuated, without definite evidence for proximal  disruption.    Glenohumeral joint:    Effusion/cyst: No significant glenohumeral joint effusion.    Articular cartilage: Moderate chondral thinning along the superior humeral head without well-defined full-thickness chondromalacia or osteochondral abnormality.    Loose bodies: No discrete intra-articular body within the joint.    Labrum: Degenerative appearing blunting, fraying, and tearing with intrameniscal ganglion along the superior labrum. Additional tearing is seen to involve the posterior and inferior labrum.    Inferior glenohumeral ligament/axillary pouch: Intact. The axillary pouch is normal in thickness and signal. No evidence of adhesive capsulitis or capsular injury.    IMPRESSION:    1. Full-thickness, partial width tearing of the infraspinatus of the distal insertional footprint measuring 1.4 cm in AP dimension, with delamination and differential retraction of torn tendon fibers to the medial humeral head. Few inferior fibers remain attached to the greater tuberosity. Severe muscle belly fatty atrophy.    2. Attenuated appearance of the distal supraspinatus tendon fibers with suggested partial thickness undersurface tearing and retraction to the level of the rotator cable. Moderate muscle belly fatty atrophy.    3. Mild subscapularis tendinopathy without well-defined appearance of partial or full-thickness rotator cuff tearing.    4. Downsloping morphology of the distal acromial along with prominent subacromial osseous spur contribute to narrowing of the acromiohumeral space and may contribute to clinical symptoms of subacromial impingement.    5. Attenuated appearance of the intra-articular long head biceps tendon without evidence for proximal rupture or displacement from the bicipital groove.    6. Chondral thinning along the superior humeral head without well-defined full-thickness chondromalacia or osteochondral abnormality. No osteophytic spurring or degenerative edema.    7. Broad-based  degenerative tearing of the glenoid labrum.    [facility]        Electronically signed on 7/25/2020 3:39:00 PM by Tameka Templeton M.D.    Large Joint Injection/Arthocentesis: R subacromial bursa    Date/Time: 12/10/2024 10:47 AM    Performed by: Jasmeet Shah DO  Authorized by: Jasmeet Shah DO    Indications:  Pain  Needle Size:  25 G  Guidance: landmark guided    Approach:  Posterolateral  Location:  Shoulder      Site:  R subacromial bursa  Medications:  40 mg triamcinolone 40 MG/ML; 2 mL lidocaine 1 %  Outcome:  Tolerated well, no immediate complications  Procedure discussed: discussed risks, benefits, and alternatives    Consent Given by:  Patient  Timeout: timeout called immediately prior to procedure    Prep: patient was prepped and draped in usual sterile fashion

## 2024-12-10 ENCOUNTER — ANCILLARY PROCEDURE (OUTPATIENT)
Dept: GENERAL RADIOLOGY | Facility: CLINIC | Age: 79
End: 2024-12-10
Attending: PEDIATRICS
Payer: COMMERCIAL

## 2024-12-10 ENCOUNTER — OFFICE VISIT (OUTPATIENT)
Dept: ORTHOPEDICS | Facility: CLINIC | Age: 79
End: 2024-12-10
Attending: NURSE PRACTITIONER
Payer: COMMERCIAL

## 2024-12-10 VITALS — HEIGHT: 63 IN | BODY MASS INDEX: 24.63 KG/M2 | WEIGHT: 139 LBS

## 2024-12-10 DIAGNOSIS — M25.511 CHRONIC RIGHT SHOULDER PAIN: ICD-10-CM

## 2024-12-10 DIAGNOSIS — Z87.39 HISTORY OF ROTATOR CUFF TEAR: Primary | ICD-10-CM

## 2024-12-10 DIAGNOSIS — G89.29 CHRONIC RIGHT SHOULDER PAIN: ICD-10-CM

## 2024-12-10 PROCEDURE — 99213 OFFICE O/P EST LOW 20 MIN: CPT | Mod: 25 | Performed by: PEDIATRICS

## 2024-12-10 PROCEDURE — 73030 X-RAY EXAM OF SHOULDER: CPT | Mod: TC | Performed by: RADIOLOGY

## 2024-12-10 PROCEDURE — 20610 DRAIN/INJ JOINT/BURSA W/O US: CPT | Mod: RT | Performed by: PEDIATRICS

## 2024-12-10 RX ORDER — TRIAMCINOLONE ACETONIDE 40 MG/ML
40 INJECTION, SUSPENSION INTRA-ARTICULAR; INTRAMUSCULAR
Status: COMPLETED | OUTPATIENT
Start: 2024-12-10 | End: 2024-12-10

## 2024-12-10 RX ORDER — LIDOCAINE HYDROCHLORIDE 10 MG/ML
2 INJECTION, SOLUTION INFILTRATION; PERINEURAL
Status: COMPLETED | OUTPATIENT
Start: 2024-12-10 | End: 2024-12-10

## 2024-12-10 RX ADMIN — TRIAMCINOLONE ACETONIDE 40 MG: 40 INJECTION, SUSPENSION INTRA-ARTICULAR; INTRAMUSCULAR at 10:47

## 2024-12-10 RX ADMIN — LIDOCAINE HYDROCHLORIDE 2 ML: 10 INJECTION, SOLUTION INFILTRATION; PERINEURAL at 10:47

## 2024-12-10 NOTE — LETTER
12/10/2024      Abi Lees  [address] [facility] [place] [address]      Dear Colleague,    Thank you for referring your patient, Abi Lees, to the [facility]. Please see a copy of my visit note below.    ASSESSMENT & PLAN    Abi was seen today for pain.    Diagnoses and all orders for this visit:    History of rotator cuff tear  -     Large Joint Injection/Arthocentesis: R subacromial bursa    Chronic right shoulder pain  -     Orthopedic  Referral  -     XR Shoulder Right G/E 3 Views; Future  -     Large Joint Injection/Arthocentesis: R subacromial bursa      Aggravation of chronic issue. Suspect advanced cuff tearing, some cuff arthropathy.  Interested in injection today, to start. Depending on course, consider imaging-guided GH joint injection, updated MRI, PT, possibly future referral to ortho surgery if ongoing issues.  See below.  Questions answered. Discussed signs and symptoms that may indicate more serious issues; the patient was instructed to seek appropriate care if noted. Abi indicates understanding of these issues and agrees with the plan.        See Patient Instructions  Patient Instructions   Current right shoulder findings with pain and dysfunction, along with bruising likely related to advancing rotator cuff tearing.  Previous right shoulder MRI in 2020 demonstrated areas of rotator cuff tearing.  X-rays today show elevation of the humeral head relative the glenoid, likely indicative of chronic rotator cuff tear.  We discussed considerations around new MRI, referral to physical therapy, trial of steroid injection for pain relief.  Following discussion, right shoulder subacromial steroid injection done today.  If not getting desired relief from this, alternative injection target likely would be the glenohumeral (shoulder) joint, done with imaging guidance (referral to one of my colleagues for use ultrasound).  Otherwise, future considerations could  include MRI, PT, depending on course.  Monitor at least 2 weeks to begin, with the injection done today.  If improving, follow-up is open-ended.    If you have any further questions for your physician or physician s care team you can contact them thru [facility] or by calling [phone].        Injection Discharge Instructions    You may shower, however avoid swimming, tub baths or hot tubs for 24 hours following your procedure  You may have a mild to moderate increase in pain for several days following the injection.  It may take up to 14 days for the steroid medication to start working although you may feel the effect as early as a few days after the procedure.  You may use ice packs for 10-15 minutes, 3 to 4 times a day at the injection site for comfort  You may use anti-inflammatory medications (such as Ibuprofen or Aleve or Advil) if you are able to take safely, or acetaminophen (Tylenol) for pain control if necessary  If you were fasting, you may resume your normal diet and medications after the procedure  If you have diabetes, check your blood sugar more frequently than usual as your blood sugar may be higher than normal for 10-14 days following a steroid injection. Contact your doctor who manages your diabetes if your blood sugar is higher than usual    If you experience any of the following, call the clinic @ [phone]  - Fever over 100 degrees F  - Swelling, bleeding, redness, drainage, warmth at the injection site  - New or worsening pain      Jasmeet Shah [facility]      CC: Evelyn Yanes      -----  Chief Complaint   Patient presents with     Right Shoulder - Pain       SUBJECTIVE  Abi Lees is a/an 79 year old female who is seen in consultation at the request of  Evelyn Yanes C.N.P. for evaluation of right shoulder.     The patient is seen with their .  The patient is Right handed    Onset: 2 years(s) ago. Patient describes  "injury as tripping and landing on the shoulder, having pain since  Location of Pain: right shoulder, diffuse shoulder pain  Worsened by: Flexion abduction  Better with:   Treatments tried: chiropractic  Associated symptoms: bruising along axilla    Orthopedic/Surgical history: NO  Social History/Occupation: Retired    Notes MRI from [facility], attached below. Done 7/24/20.    **  Above information per rooming staff.  Additional history:  Ongoing right shoulder issues. Has done chiropractic care in past with some benefit.  However, more recently has not had benefit from chiropractic care.  Noted recent bruising right upper arm ~4 days ago, also some lateral shoulder, right upper chest.  Is on eliquis, but not a new medication, and has not had bruising related to medication previously.        REVIEW OF SYSTEMS:  Review of Systems    OBJECTIVE:  Ht 1.6 m (5' 3\")   Wt 63 kg (139 lb)   BMI 24.62 kg/m           Right Shoulder exam    ROM:      forward flexion ~45        abduction ~55       internal rotation no pain with belly press       external rotation mild limitation with pain  Pain with flexion, abduction    Tender: anterior shoulder    Strength:      abduction 4+/5       internal rotation 4+/5       external rotation 3/5  Pain with ER    Impingement testing:      min pain with Treadwell       Some pain with empty can    Skin:      well perfused       capillary refill brisk  Area of ecchymosis anterior axilla, proximal and lateral upper arm/shoulder    Sensation:      normal sensation over shoulder and upper extremity       RADIOLOGY:  Final results and radiologist's interpretation, available in the [facility] health record.  Images were reviewed with the patient in the office today.  My personal interpretation of the performed imaging: narrowing acromiohumeral space, elevation of humeral head relative to glenoid likely represents cuff arthropathy. Mild inferior humeral head spur. No clear acute bony abnormality " noted.        Recent Results (from the past 24 hours)   XR Shoulder Right G/E 3 Views    Narrative    RIGHT SHOULDER THREE OR MORE VIEWS  12/10/2024 10:17 AM    INDICATION: Right shoulder pain.    COMPARISON: 3/29/2019      Impression    IMPRESSION: New superior migration of the humeral head narrows the  subacromial space and can be seen with rotator cuff tearing. No acute  displaced right shoulder fracture or dislocation. Suspect interval  distal right clavicle resection. Progressive mild right glenohumeral  joint osteoarthritis with inferior glenohumeral spurring. Subacromial  spur. Diffuse bone demineralization. Degenerative change in the  visualized spine. Visualized right lung is grossly clear.    LISA FULLER MD         SYSTEM ID:  [id number]             =====================    Previous right shoulder MRI visualized/reviewed:  Full thickness cuff tearing.        [facility]  [address] [place] [address]  Phone: [phone]  Fax: [phone]    Referring Physician Information:  Prosper Muñoz M.D.  [address] [place] [address]  Phone: [phone]  Fax: [phone]  Patient: Abi Webercaden BACH.B: 1945  Sex: Female  Phone: [phone]    CDI/Lloyd MRN: [id number]  Exam Date: 07/24/2020  EXAM: MRI of the RIGHT SHOULDER, without contrast    CLINICAL INFORMATION: Female, 74 years old, with right shoulder pain for one year after fall injury.    INDICATION: Evaluate for rotator cuff tear.    PRIOR SURGERY: None reported.    PLAIN FILMS: None available.    COMPARISONS: No prior MRIs available.    TECHNICAL INFORMATION: Using a 1.2T MR scanner and a localizing surface coil:    Coronals: PD, T2FS    Sagittals: PDFS, T2    Axials: PD, PDFS    SEDATION: None    CONTRAST: None    FINDINGS:    Bones:    Proximal humerus: No fracture or marrow edema/pathology. No humeral Hill-Sachs or reverse Hill-Sachs lesion/impaction or contusion.    Glenoid: No fracture or  marrow edema/pathology. No osseous Bankart lesion.    Rotator cuff and muscles/tendons:    Supraspinatus: Distal supraspinatus tendon fibers are attenuated and irregular in appearance. There is suggested partial thickness undersurface tearing of the posterior fibers with retraction to the level of the rotator cable (coronal series 9 image 12). No full-thickness tendon tearing or retraction. Moderate muscle belly fatty atrophy.    Infraspinatus: There is appearance of full-thickness, partial width tearing of the infraspinatus at the distal insertional footprint measuring 1.4 cm in AP dimension, with delamination and differential retraction of torn tendon fibers to the level of the medial humeral head (coronal series 9 image 10). Few of the inferior tendon fibers remain attached on the greater tuberosity (sagittal series 10 image 8-15). Severe muscle belly fatty atrophy    Teres minor: No tendinopathy, tear or atrophy.    Subscapularis: Mild subscapularis tendinopathy without well-defined appearance of partial or full-thickness rotator cuff tear. No muscle volume loss or fatty infiltration.    Deltoid: No strain or atrophy.    Coracoacromial arch:    Acromion morphology: The acromion has type II morphology. Prominent appearance of broad-based subacromial osseous spur measuring 11 x 6 mm (coronal series 8 image 17, and sagittal series 10 image 13).    Acromiohumeral space: The acromiohumeral space is narrowed, measuring 4 mm..    Coracohumeral space: The coracohumeral space is within normal limits.    Acromioclavicular joint:    Joint: There is appearance of widening of the acromioclavicular joint, which in the absence of prior surgery may suggest remote AC joint trauma.    Ligaments: Coracoclavicular ligaments appear intact and relatively thickened, also possibly reflecting residua of chronic injury.    Bursae:    Subacromial-subdeltoid: No convincing subacromial bursal thickening/bursitis.    Subcoracoid: No  convincing subcoracoid bursal thickening/bursitis.    Biceps tendon: The long head of the biceps tendon is present within the bicipital groove. Intra-articular long head biceps tendon appears prominently attenuated, without definite evidence for proximal disruption.    Glenohumeral joint:    Effusion/cyst: No significant glenohumeral joint effusion.    Articular cartilage: Moderate chondral thinning along the superior humeral head without well-defined full-thickness chondromalacia or osteochondral abnormality.    Loose bodies: No discrete intra-articular body within the joint.    Labrum: Degenerative appearing blunting, fraying, and tearing with intrameniscal ganglion along the superior labrum. Additional tearing is seen to involve the posterior and inferior labrum.    Inferior glenohumeral ligament/axillary pouch: Intact. The axillary pouch is normal in thickness and signal. No evidence of adhesive capsulitis or capsular injury.    IMPRESSION:    1. Full-thickness, partial width tearing of the infraspinatus of the distal insertional footprint measuring 1.4 cm in AP dimension, with delamination and differential retraction of torn tendon fibers to the medial humeral head. Few inferior fibers remain attached to the greater tuberosity. Severe muscle belly fatty atrophy.    2. Attenuated appearance of the distal supraspinatus tendon fibers with suggested partial thickness undersurface tearing and retraction to the level of the rotator cable. Moderate muscle belly fatty atrophy.    3. Mild subscapularis tendinopathy without well-defined appearance of partial or full-thickness rotator cuff tearing.    4. Downsloping morphology of the distal acromial along with prominent subacromial osseous spur contribute to narrowing of the acromiohumeral space and may contribute to clinical symptoms of subacromial impingement.    5. Attenuated appearance of the intra-articular long head biceps tendon without evidence for proximal  rupture or displacement from the bicipital groove.    6. Chondral thinning along the superior humeral head without well-defined full-thickness chondromalacia or osteochondral abnormality. No osteophytic spurring or degenerative edema.    7. Broad-based degenerative tearing of the glenoid labrum.    KME        Electronically signed on 7/25/2020 3:39:00 PM by Tameka Templeton M.D.    Large Joint Injection/Arthocentesis: R subacromial bursa    Date/Time: 12/10/2024 10:47 AM    Performed by: Jasmeet Shah DO  Authorized by: Jasmeet Shah DO    Indications:  Pain  Needle Size:  25 G  Guidance: landmark guided    Approach:  Posterolateral  Location:  Shoulder      Site:  R subacromial bursa  Medications:  40 mg triamcinolone 40 MG/ML; 2 mL lidocaine 1 %  Outcome:  Tolerated well, no immediate complications  Procedure discussed: discussed risks, benefits, and alternatives    Consent Given by:  Patient  Timeout: timeout called immediately prior to procedure    Prep: patient was prepped and draped in usual sterile fashion                 Again, thank you for allowing me to participate in the care of your patient.        Sincerely,        Jasmeet Shah DO

## 2024-12-10 NOTE — PATIENT INSTRUCTIONS
Current right shoulder findings with pain and dysfunction, along with bruising likely related to advancing rotator cuff tearing.  Previous right shoulder MRI in 2020 demonstrated areas of rotator cuff tearing.  X-rays today show elevation of the humeral head relative the glenoid, likely indicative of chronic rotator cuff tear.  We discussed considerations around new MRI, referral to physical therapy, trial of steroid injection for pain relief.  Following discussion, right shoulder subacromial steroid injection done today.  If not getting desired relief from this, alternative injection target likely would be the glenohumeral (shoulder) joint, done with imaging guidance (referral to one of my colleagues for use ultrasound).  Otherwise, future considerations could include MRI, PT, depending on course.  Monitor at least 2 weeks to begin, with the injection done today.  If improving, follow-up is open-ended.    If you have any further questions for your physician or physician s care team you can contact them thru [facility] or by calling [phone].        Injection Discharge Instructions    You may shower, however avoid swimming, tub baths or hot tubs for 24 hours following your procedure  You may have a mild to moderate increase in pain for several days following the injection.  It may take up to 14 days for the steroid medication to start working although you may feel the effect as early as a few days after the procedure.  You may use ice packs for 10-15 minutes, 3 to 4 times a day at the injection site for comfort  You may use anti-inflammatory medications (such as Ibuprofen or Aleve or Advil) if you are able to take safely, or acetaminophen (Tylenol) for pain control if necessary  If you were fasting, you may resume your normal diet and medications after the procedure  If you have diabetes, check your blood sugar more frequently than usual as your blood sugar may be higher than normal for 10-14 days following a  steroid injection. Contact your doctor who manages your diabetes if your blood sugar is higher than usual    If you experience any of the following, call the clinic @ [phone]  - Fever over 100 degrees F  - Swelling, bleeding, redness, drainage, warmth at the injection site  - New or worsening pain

## 2025-01-29 ENCOUNTER — TELEPHONE (OUTPATIENT)
Dept: CARDIOLOGY | Facility: CLINIC | Age: 80
End: 2025-01-29
Payer: COMMERCIAL

## 2025-01-29 DIAGNOSIS — I48.0 PAROXYSMAL ATRIAL FIBRILLATION (H): ICD-10-CM

## 2025-01-29 NOTE — TELEPHONE ENCOUNTER
Called pt to discuss. She would like a new script called to pharmacy. Done. Cheryl Santos RN Cardiology January 29, 2025, 3:09 PM

## 2025-01-29 NOTE — TELEPHONE ENCOUNTER
Patient has Medicare Advantage through [employer] ([employer]-S[employer]).    Eliquis/Xarelto  --Patient is will pay 100% of the first $340 in drug costs (first month will be as much as $387)  --Subsequent fills will be $47/mo.  --If/when total out of pocket drug costs exceed $2000, patient will pay $0 for all covered drugs for the remainder of the year.    If patient is struggling with the first month due to deductible, I recommend patient consider participating in the Medicare Prescription Payment Plan, which would spread drug costs more evenly throughout the year.  Patient may enroll in this program now, or at any time during 2025, by calling the member services phone number for their plan.      Gali Godinez  Pharmacy Technician/Liaison, Discharge Pharmacy   [phone] (voice or text)  [email or web address]  Pharmacy test claims are estimates and may not reflect final costs.   Suggested alternatives aim to be cost-effective but may not be therapeutically equivalent as this consult is informational and does not constitute medical advice.   Clinical decisions should be made by qualified healthcare providers.

## 2025-01-29 NOTE — TELEPHONE ENCOUNTER
Talked with . Pt working until 2:30 pm today. Will call about 3 pm to discuss pharmacy liaison information. Cheryl Santos RN Cardiology January 29, 2025, 10:34 AM

## 2025-01-29 NOTE — TELEPHONE ENCOUNTER
Health Call Center    Phone Message    May a detailed message be left on voicemail: yes     Reason for Call: Medication Question or concern regarding medication   Prescription Clarification  Name of Medication: apixaban ANTICOAGULANT (ELIQUIS ANTICOAGULANT) 5 MG tablet [[id number]] (Order [id number])     Prescribing Provider: rubén   Pharmacy:      [facility] [address] [address] [address] [address]     What on the order needs clarification? $400 a month, pt stopped taking this past Sunday. Pt is looking for alternative.     Please call pt back after 230pm to discuss.     Action Taken: Other: cardiology     Travel Screening: Not Applicable     Thank you!  [facility]

## 2025-01-29 NOTE — TELEPHONE ENCOUNTER
Will discuss with Pharmacy Liaison for pt copay moving forward. Cheryl Santos RN Cardiology January 29, 2025, 9:14 AM     Yes - the patient is able to be screened

## 2025-02-25 ENCOUNTER — PATIENT OUTREACH (OUTPATIENT)
Dept: CARE COORDINATION | Facility: CLINIC | Age: 80
End: 2025-02-25
Payer: COMMERCIAL

## 2025-03-29 SDOH — HEALTH STABILITY: PHYSICAL HEALTH: ON AVERAGE, HOW MANY MINUTES DO YOU ENGAGE IN EXERCISE AT THIS LEVEL?: 150+ MIN

## 2025-03-29 SDOH — HEALTH STABILITY: PHYSICAL HEALTH: ON AVERAGE, HOW MANY DAYS PER WEEK DO YOU ENGAGE IN MODERATE TO STRENUOUS EXERCISE (LIKE A BRISK WALK)?: 3 DAYS

## 2025-03-29 ASSESSMENT — SOCIAL DETERMINANTS OF HEALTH (SDOH): HOW OFTEN DO YOU GET TOGETHER WITH FRIENDS OR RELATIVES?: ONCE A WEEK

## 2025-04-03 ENCOUNTER — OFFICE VISIT (OUTPATIENT)
Dept: FAMILY MEDICINE | Facility: CLINIC | Age: 80
End: 2025-04-03
Attending: NURSE PRACTITIONER
Payer: COMMERCIAL

## 2025-04-03 VITALS
TEMPERATURE: 97.6 F | BODY MASS INDEX: 25.06 KG/M2 | RESPIRATION RATE: 16 BRPM | WEIGHT: 136.2 LBS | OXYGEN SATURATION: 100 % | DIASTOLIC BLOOD PRESSURE: 70 MMHG | HEIGHT: 62 IN | SYSTOLIC BLOOD PRESSURE: 132 MMHG | HEART RATE: 59 BPM

## 2025-04-03 DIAGNOSIS — I10 ESSENTIAL HYPERTENSION WITH GOAL BLOOD PRESSURE LESS THAN 140/90: ICD-10-CM

## 2025-04-03 DIAGNOSIS — I25.10 CORONARY ARTERY DISEASE INVOLVING NATIVE CORONARY ARTERY OF NATIVE HEART WITHOUT ANGINA PECTORIS: ICD-10-CM

## 2025-04-03 DIAGNOSIS — I48.0 PAROXYSMAL ATRIAL FIBRILLATION (H): ICD-10-CM

## 2025-04-03 DIAGNOSIS — Z79.899 HIGH RISK MEDICATION USE: ICD-10-CM

## 2025-04-03 DIAGNOSIS — B37.2 CANDIDAL INTERTRIGO: ICD-10-CM

## 2025-04-03 DIAGNOSIS — G62.9 NEUROPATHY: ICD-10-CM

## 2025-04-03 DIAGNOSIS — Z00.00 ENCOUNTER FOR MEDICARE ANNUAL WELLNESS EXAM: Primary | ICD-10-CM

## 2025-04-03 DIAGNOSIS — N18.31 CHRONIC KIDNEY DISEASE, STAGE 3A (H): ICD-10-CM

## 2025-04-03 LAB
BASOPHILS # BLD AUTO: 0.1 10E3/UL (ref 0–0.2)
BASOPHILS NFR BLD AUTO: 1 %
EOSINOPHIL # BLD AUTO: 0.1 10E3/UL (ref 0–0.7)
EOSINOPHIL NFR BLD AUTO: 1 %
ERYTHROCYTE [DISTWIDTH] IN BLOOD BY AUTOMATED COUNT: 11.9 % (ref 10–15)
HCT VFR BLD AUTO: 44 % (ref 35–47)
HGB BLD-MCNC: 14.4 G/DL (ref 11.7–15.7)
IMM GRANULOCYTES # BLD: 0 10E3/UL
IMM GRANULOCYTES NFR BLD: 0 %
LYMPHOCYTES # BLD AUTO: 2.6 10E3/UL (ref 0.8–5.3)
LYMPHOCYTES NFR BLD AUTO: 26 %
MCH RBC QN AUTO: 31 PG (ref 26.5–33)
MCHC RBC AUTO-ENTMCNC: 32.7 G/DL (ref 31.5–36.5)
MCV RBC AUTO: 95 FL (ref 78–100)
MONOCYTES # BLD AUTO: 0.7 10E3/UL (ref 0–1.3)
MONOCYTES NFR BLD AUTO: 7 %
NEUTROPHILS # BLD AUTO: 6.3 10E3/UL (ref 1.6–8.3)
NEUTROPHILS NFR BLD AUTO: 65 %
PLATELET # BLD AUTO: 195 10E3/UL (ref 150–450)
RBC # BLD AUTO: 4.64 10E6/UL (ref 3.8–5.2)
WBC # BLD AUTO: 9.7 10E3/UL (ref 4–11)

## 2025-04-03 RX ORDER — HYDROCHLOROTHIAZIDE 25 MG/1
25 TABLET ORAL DAILY
Qty: 90 TABLET | Refills: 3 | Status: SHIPPED | OUTPATIENT
Start: 2025-04-03

## 2025-04-03 RX ORDER — GABAPENTIN 300 MG/1
CAPSULE ORAL
Qty: 450 CAPSULE | Refills: 3 | Status: SHIPPED | OUTPATIENT
Start: 2025-04-03 | End: 2025-04-03

## 2025-04-03 RX ORDER — NYSTATIN 100000 U/G
CREAM TOPICAL
Qty: 30 G | Refills: 1 | Status: SHIPPED | OUTPATIENT
Start: 2025-04-03

## 2025-04-03 RX ORDER — GABAPENTIN 300 MG/1
CAPSULE ORAL
Qty: 450 CAPSULE | Refills: 3 | Status: SHIPPED | OUTPATIENT
Start: 2025-04-03

## 2025-04-03 RX ORDER — LISINOPRIL 40 MG/1
40 TABLET ORAL DAILY
Qty: 90 TABLET | Refills: 3 | Status: SHIPPED | OUTPATIENT
Start: 2025-04-03

## 2025-04-03 ASSESSMENT — PAIN SCALES - GENERAL: PAINLEVEL_OUTOF10: NO PAIN (0)

## 2025-04-03 NOTE — PATIENT INSTRUCTIONS
Patient Education   Preventive Care Advice   This is general advice given by our system to help you stay healthy. However, your care team may have specific advice just for you. Please talk to your care team about your preventive care needs.  Nutrition  Eat 5 or more servings of fruits and vegetables each day.  Try wheat bread, brown rice and whole grain pasta (instead of white bread, rice, and pasta).  Get enough calcium and vitamin D. Check the label on foods and aim for 100% of the RDA (recommended daily allowance).  Lifestyle  Exercise at least 150 minutes each week  (30 minutes a day, 5 days a week).  Do muscle strengthening activities 2 days a week. These help control your weight and prevent disease.  No smoking.  Wear sunscreen to prevent skin cancer.  Have a dental exam and cleaning every 6 months.  Yearly exams  See your health care team every year to talk about:  Any changes in your health.  Any medicines your care team has prescribed.  Preventive care, family planning, and ways to prevent chronic diseases.  Shots (vaccines)   HPV shots (up to age 26), if you've never had them before.  Hepatitis B shots (up to age 59), if you've never had them before.  COVID-19 shot: Get this shot when it's due.  Flu shot: Get a flu shot every year.  Tetanus shot: Get a tetanus shot every 10 years.  Pneumococcal, hepatitis A, and RSV shots: Ask your care team if you need these based on your risk.  Shingles shot (for age 50 and up)  General health tests  Diabetes screening:  Starting at age 35, Get screened for diabetes at least every 3 years.  If you are younger than age 35, ask your care team if you should be screened for diabetes.  Cholesterol test: At age 39, start having a cholesterol test every 5 years, or more often if advised.  Bone density scan (DEXA): At age 50, ask your care team if you should have this scan for osteoporosis (brittle bones).  Hepatitis C: Get tested at least once in your life.  STIs (sexually  transmitted infections)  Before age 24: Ask your care team if you should be screened for STIs.  After age 24: Get screened for STIs if you're at risk. You are at risk for STIs (including HIV) if:  You are sexually active with more than one person.  You don't use condoms every time.  You or a partner was diagnosed with a sexually transmitted infection.  If you are at risk for HIV, ask about PrEP medicine to prevent HIV.  Get tested for HIV at least once in your life, whether you are at risk for HIV or not.  Cancer screening tests  Cervical cancer screening: If you have a cervix, begin getting regular cervical cancer screening tests starting at age 21.  Breast cancer scan (mammogram): If you've ever had breasts, begin having regular mammograms starting at age 40. This is a scan to check for breast cancer.  Colon cancer screening: It is important to start screening for colon cancer at age 45.  Have a colonoscopy test every 10 years (or more often if you're at risk) Or, ask your provider about stool tests like a FIT test every year or Cologuard test every 3 years.  To learn more about your testing options, visit:   .  For help making a decision, visit:   https://bit.ly/fu54072.  Prostate cancer screening test: If you have a prostate, ask your care team if a prostate cancer screening test (PSA) at age 55 is right for you.  Lung cancer screening: If you are a current or former smoker ages 50 to 80, ask your care team if ongoing lung cancer screenings are right for you.  For informational purposes only. Not to replace the advice of your health care provider. Copyright   2023 [facility]. All rights reserved. Clinically reviewed by the [facility]. [id number] - REV 01/24.  Learning About Stress  What is stress?     Stress is your body's response to a hard situation. Your body can have a physical, emotional, or mental response. Stress is a fact of life for most people, and it  affects everyone differently. What causes stress for you may not be stressful for someone else.  A lot of things can cause stress. You may feel stress when you go on a job interview, take a test, or run a race. This kind of short-term stress is normal and even useful. It can help you if you need to work hard or react quickly. For example, stress can help you finish an important job on time.  Long-term stress is caused by ongoing stressful situations or events. Examples of long-term stress include long-term health problems, ongoing problems at work, or conflicts in your family. Long-term stress can harm your health.  How does stress affect your health?  When you are stressed, your body responds as though you are in danger. It makes hormones that speed up your heart, make you breathe faster, and give you a burst of energy. This is called the fight-or-flight stress response. If the stress is over quickly, your body goes back to normal and no harm is done.  But if stress happens too often or lasts too long, it can have bad effects. Long-term stress can make you more likely to get sick, and it can make symptoms of some diseases worse. If you tense up when you are stressed, you may develop neck, shoulder, or low back pain. Stress is linked to high blood pressure and heart disease.  Stress also harms your emotional health. It can make you anderson, tense, or depressed. Your relationships may suffer, and you may not do well at work or school.  What can you do to manage stress?  You can try these things to help manage stress:   Do something active. Exercise or activity can help reduce stress. Walking is a great way to get started. Even everyday activities such as housecleaning or yard work can help.  Try yoga or [place] chi. These techniques combine exercise and meditation. You may need some training at first to learn them.  Do something you enjoy. For example, listen to music or go to a movie. Practice your hobby or do volunteer  "work.  Meditate. This can help you relax, because you are not worrying about what happened before or what may happen in the future.  Do guided imagery. Imagine yourself in any setting that helps you feel calm. You can use online videos, books, or a teacher to guide you.  Do breathing exercises. For example:  From a standing position, bend forward from the waist with your knees slightly bent. Let your arms dangle close to the floor.  Breathe in slowly and deeply as you return to a standing position. Roll up slowly and lift your head last.  Hold your breath for just a few seconds in the standing position.  Breathe out slowly and bend forward from the waist.  Let your feelings out. Talk, laugh, cry, and express anger when you need to. Talking with supportive friends or family, a counselor, or a wendy leader about your feelings is a healthy way to relieve stress. Avoid discussing your feelings with people who make you feel worse.  Write. It may help to write about things that are bothering you. This helps you find out how much stress you feel and what is causing it. When you know this, you can find better ways to cope.  What can you do to prevent stress?  You might try some of these things to help prevent stress:  Manage your time. This helps you find time to do the things you want and need to do.  Get enough sleep. Your body recovers from the stresses of the day while you are sleeping.  Get support. Your family, friends, and community can make a difference in how you experience stress.  Limit your news feed. Avoid or limit time on social media or news that may make you feel stressed.  Do something active. Exercise or activity can help reduce stress. Walking is a great way to get started.  Where can you learn more?  Go to https://www.[facility] [facility].net/patiented  Enter N032 in the search box to learn more about \"Learning About Stress.\"  Current as of: October 24, 2024  Content Version: 14.4 2024-2025 [employer]   Care instructions adapted under license by your healthcare professional. If you have questions about a medical condition or this instruction, always ask your healthcare professional. [email or web address] disclaims any warranty or liability for your use of this information.    Bladder Training: Care Instructions  Your Care Instructions     Bladder training is used to treat urge incontinence and stress incontinence. Urge incontinence means that the need to urinate comes on so fast that you can't get to a toilet in time. Stress incontinence means that you leak urine because of pressure on your bladder. For example, it may happen when you laugh, cough, or lift something heavy.  Bladder training can increase how long you can wait before you have to urinate. It can also help your bladder hold more urine. And it can give you better control over the urge to urinate.  It is important to remember that bladder training takes a few weeks to a few months to make a difference. You may not see results right away, but don't give up.  Follow-up care is a key part of your treatment and safety. Be sure to make and go to all appointments, and call your doctor if you are having problems. It's also a good idea to know your test results and keep a list of the medicines you take.  How can you care for yourself at home?  Work with your doctor to come up with a bladder training program that is right for you. You may use one or more of the following methods.  Delayed urination  In the beginning, try to keep from urinating for 5 minutes after you first feel the need to go.  While you wait, take deep, slow breaths to relax. Kegel exercises can also help you delay the need to go to the bathroom.  After some practice, when you can easily wait 5 minutes to urinate, try to wait 10 minutes before you urinate.  Slowly increase the waiting period until you are able to control when you have to urinate.  Scheduled urination  Empty your bladder  "when you first wake up in the morning.  Schedule times throughout the day when you will urinate.  Start by going to the bathroom every hour, even if you don't need to go.  Slowly increase the time between trips to the bathroom.  When you have found a schedule that works well for you, keep doing it.  If you wake up during the night and have to urinate, do it. Apply your schedule to waking hours only.  Kegel exercises  These tighten and strengthen pelvic muscles, which can help you control the flow of urine. (If doing these exercises causes pain, stop doing them and talk with your doctor.) To do Kegel exercises:  Squeeze your muscles as if you were trying not to pass gas. Or squeeze your muscles as if you were stopping the flow of urine. Your belly, legs, and buttocks shouldn't move.  Hold the squeeze for 3 seconds, then relax for 5 to 10 seconds.  Start with 3 seconds, then add 1 second each week until you are able to squeeze for 10 seconds.  Repeat the exercise 10 times a session. Do 3 to 8 sessions a day.  When should you call for help?  Watch closely for changes in your health, and be sure to contact your doctor if:    Your incontinence is getting worse.     You do not get better as expected.   Where can you learn more?  Go to https://www.[employer].net/patiented  Enter V684 in the search box to learn more about \"Bladder Training: Care Instructions.\"  Current as of: April 30, 2024  Content Version: 14.4    9745-5151 [employer].   Care instructions adapted under license by your healthcare professional. If you have questions about a medical condition or this instruction, always ask your healthcare professional. [employer] disclaims any warranty or liability for your use of this information.    Substance Use Disorder: Care Instructions  Overview     You can improve your life and health by stopping your use of alcohol or drugs. When you don't drink or use drugs, you may feel and sleep better. " You may get along better with your family, friends, and coworkers. There are medicines and programs that can help with substance use disorder.  How can you care for yourself at home?  Here are some ways to help you stay sober and prevent relapse.  If you have been given medicine to help keep you sober or reduce your cravings, be sure to take it exactly as prescribed.  Talk to your doctor about programs that can help you stop using drugs or drinking alcohol.  Do not keep alcohol or drugs in your home.  Plan ahead. Think about what you'll say if other people ask you to drink or use drugs. Try not to spend time with people who drink or use drugs.  Use the time and money spent on drinking or drugs to do something that's important to you.  Preventing a relapse  Have a plan to deal with relapse. Learn to recognize changes in your thinking that lead you to drink or use drugs. Get help before you start to drink or use drugs again.  Try to stay away from situations, friends, or places that may lead you to drink or use drugs.  If you feel the need to drink alcohol or use drugs again, seek help right away. Call a trusted friend or family member. Some people get support from organizations such as Narcotics Anonymous or [employer] or from treatment facilities.  If you relapse, get help as soon as you can. Some people make a plan with another person that outlines what they want that person to do for them if they relapse. The plan usually includes how to handle the relapse and who to notify in case of relapse.  Don't give up. Remember that a relapse doesn't mean that you have failed. Use the experience to learn the triggers that lead you to drink or use drugs. Then quit again. Recovery is a lifelong process. Many people have several relapses before they are able to quit for good.  Follow-up care is a key part of your treatment and safety. Be sure to make and go to all appointments, and call your doctor if you are having  "problems. It's also a good idea to know your test results and keep a list of the medicines you take.  When should you call for help?   Call 911  anytime you think you may need emergency care. For example, call if you or someone else:    Has overdosed or has withdrawal signs. Be sure to tell the emergency workers that you are or someone else is using or trying to quit using drugs. Overdose or withdrawal signs may include:  Losing consciousness.  Seizure.  Seeing or hearing things that aren't there (hallucinations).     Is thinking or talking about suicide or harming others.   Where to get help 24 hours a day, 7 days a week   If you or someone you know talks about suicide, self-harm, a mental health crisis, a substance use crisis, or any other kind of emotional distress, get help right away. You can:    Call the Suicide and Crisis Lifeline at 888.     Call 4-127-270-TALK (1-578.779.1675).     Text HOME to 925460 to access the Crisis Text Line.   Consider saving these numbers in your phone.  Go to [employer] for more information or to chat online.  Call your doctor now or seek immediate medical care if:    You are having withdrawal symptoms. These may include nausea or vomiting, sweating, shakiness, and anxiety.   Watch closely for changes in your health, and be sure to contact your doctor if:    You have a relapse.     You need more help or support to stop.   Where can you learn more?  Go to https://www.[employer].net/patiented  Enter H573 in the search box to learn more about \"Substance Use Disorder: Care Instructions.\"  Current as of: August 20, 2024  Content Version: 14.4    3647-8156 [employer].   Care instructions adapted under license by your healthcare professional. If you have questions about a medical condition or this instruction, always ask your healthcare professional. [employer] disclaims any warranty or liability for your use of this information.       "

## 2025-04-03 NOTE — PROGRESS NOTES
Preventive Care Visit  [facility] EMORY Lino CNP, Family Medicine  Apr 3, 2025      Assessment & Plan     Coronary artery disease involving native coronary artery of native heart without angina pectoris  Most recent cardiology note reviewed.  Currently without symptoms.  Continue to follow routinely with cardiology.  - Lipid panel reflex to direct LDL Non-fasting; Future  - Lipid panel reflex to direct LDL Fasting; Future    Neuropathy  Worsening symptoms.  Increase gabapentin to 3 capsules at nighttime.  Will recheck lab here today.  Updated prescription sent  Follow-up 1 year.  - gabapentin (NEURONTIN) 300 MG capsule; Take 1 capsule in AM and midday and take 3 capsules at bedtime    Essential hypertension with goal blood pressure less than 140/90  -Well-controlled today in clinic.  Plan to continue current medication and dosage.  Will update labs.  Refill sent.  Follow-up in 1 year  - Lipid panel reflex to direct LDL Fasting; Future  - Basic metabolic panel; Future  - hydrochlorothiazide (HYDRODIURIL) 25 MG tablet; Take 1 tablet (25 mg) by mouth daily.  - lisinopril (ZESTRIL) 40 MG tablet; Take 1 tablet (40 mg) by mouth daily.    Encounter for Medicare annual wellness exam  Declines bone density test, pneumonia vaccine, shingles vaccine, Tdap, RSV vaccine, influenza and COVID vaccines.  - CBC with Platelets & Differential; Future  - Iron & Iron Binding Capacity; Future    High risk medication use  - Vitamin D Deficiency; Future    Candidal intertrigo  Refill given to have available when needed.  - nystatin (MYCOSTATIN) 099208 UNIT/GM external cream; Apply to affected area twice per day as needed.    Paroxysmal atrial fibrillation (H)  Most recent cardiology note reviewed.  Heart rate regular rate and rhythm here today.  Continue Eliquis.  Education given to patient regarding condition    Chronic kidney disease, stage 3a (H)  Continue to drink plenty of fluids.  Will recheck labs  here today.  Avoid over-the-counter NSAIDs.    The longitudinal plan of care for the diagnosis(es)/condition(s) as documented were addressed during this visit. Due to the added complexity in care, I will continue to support Abi in the subsequent management and with ongoing continuity of care.        Counseling  Appropriate preventive services were addressed with this patient via screening, questionnaire, or discussion as appropriate for fall prevention, nutrition, physical activity, Tobacco-use cessation, social engagement, weight loss and cognition.  Checklist reviewing preventive services available has been given to the patient.  Reviewed patient's diet, addressing concerns and/or questions.   She is at risk for lack of exercise and has been provided with information to increase physical activity for the benefit of her well-being.   She is at risk for psychosocial distress and has been provided with information to reduce risk.   Information on urinary incontinence and treatment options given to patient.         Subjective   Abi is a 79 year old, presenting for the following:  Medicare Visit        4/3/2025     9:36 AM   Additional Questions   Roomed by BLAKE Brown   Accompanied by N/A         4/3/2025     9:36 AM   Patient Reported Additional Medications   Patient reports taking the following new medications N/A           HPI       Advance Care Planning  Patient does not have a Health Care Directive: Discussed advance care planning with patient; information given to patient to review.      3/29/2025   General Health   How would you rate your overall physical health? Good   Feel stress (tense, anxious, or unable to sleep) To some extent   (!) STRESS CONCERN      3/29/2025   Nutrition   Diet: Low fat/cholesterol         3/29/2025   Exercise   Days per week of moderate/strenous exercise 3 days   Average minutes spent exercising at this level 150+ min         3/29/2025   Social Factors   Frequency of gathering  with friends or relatives Once a week   Worry food won't last until get money to buy more No   Food not last or not have enough money for food? No   Do you have housing? (Housing is defined as stable permanent housing and does not include staying ouside in a car, in a tent, in an abandoned building, in an overnight shelter, or couch-surfing.) Yes   Are you worried about losing your housing? No   Lack of transportation? No   Unable to get utilities (heat,electricity)? No         3/29/2025   Fall Risk   Fallen 2 or more times in the past year? No   Trouble with walking or balance? No          3/29/2025   Activities of Daily Living- Home Safety   Needs help with the following daily activites None of the above   Safety concerns in the home None of the above         3/29/2025   Dental   Dentist two times every year? Yes         3/29/2025   Hearing Screening   Hearing concerns? None of the above         3/29/2025   Driving Risk Screening   Patient/family members have concerns about driving No         3/29/2025   General Alertness/Fatigue Screening   Have you been more tired than usual lately? No         3/29/2025   Urinary Incontinence Screening   Bothered by leaking urine in past 6 months Yes           Today's PHQ-2 Score:       4/2/2025     3:15 PM   PHQ-2 ( 1999 [employer])   Q1: Little interest or pleasure in doing things 0   Q2: Feeling down, depressed or hopeless 0   PHQ-2 Score 0    Q1: Little interest or pleasure in doing things Not at all   Q2: Feeling down, depressed or hopeless Not at all   PHQ-2 Score 0       Patient-reported           3/29/2025   Substance Use   Alcohol more than 3/day or more than 7/wk No   Do you have a current opioid prescription? No   How severe/bad is pain from 1 to 10? 5/10   Do you use any other substances recreationally? No    (!) OTHER       Multiple values from one day are sorted in reverse-chronological order     Social History     Tobacco Use    Smoking status: Former     Current  packs/day: 0.00     Types: Cigarettes     Quit date: 2007     Years since quittin.7     Passive exposure: Past    Smokeless tobacco: Never   Vaping Use    Vaping status: Never Used   Substance Use Topics    Alcohol use: Not Currently     Comment: rare - socially    Drug use: No           2021   LAST FHS-7 RESULTS   1st degree relative breast or ovarian cancer No   Any relative bilateral breast cancer No   Any male have breast cancer No   Any ONE woman have BOTH breast AND ovarian cancer No   Any woman with breast cancer before 50yrs No   2 or more relatives with breast AND/OR ovarian cancer No   2 or more relatives with breast AND/OR bowel cancer No          ASCVD Risk   The 10-year ASCVD risk score ([employer]gen CANTU, et al., 2019) is: 29%    Values used to calculate the score:      Age: 79 years      Sex: Female      Is Non- : No      Diabetic: No      Tobacco smoker: No      Systolic Blood Pressure: 132 mmHg      Is BP treated: Yes      HDL Cholesterol: 43 mg/dL      Total Cholesterol: 174 mg/dL          Reviewed and updated as needed this visit by Provider                    Current providers sharing in care for this patient include:  Patient Care Team:  Evelyn Yanes APRN CNP as PCP - General (Family Medicine)  Evelyn Yanes APRN CNP as Assigned PCP  Yoselin Castorena APRN CNP as Assigned Neuroscience Provider  Rashad Jeffries MD as MD (Cardiology)  Evelyn Robins APRN CNP as Nurse Practitioner (Cardiovascular Disease)  Evelyn Robins APRN CNP as Assigned Heart and Vascular Provider  Jasmeet Shah DO as Assigned Musculoskeletal Provider    The following health maintenance items are reviewed in Epic and correct as of today:  Health Maintenance   Topic Date Due    Pneumococcal Vaccine: 50+ Years (1 of 2 - PCV) Never done    DTAP/TDAP/TD IMMUNIZATION (1 - Tdap) Never done    ZOSTER IMMUNIZATION (1 of 2) Never done    RSV  "VACCINE (1 - 1-dose 75+ series) Never done    INFLUENZA VACCINE (1) Never done    COVID-19 Vaccine (3 - 2024-25 season) 09/01/2024    LIPID  03/27/2025    ANNUAL REVIEW OF HM ORDERS  03/27/2025    MEDICARE ANNUAL WELLNESS VISIT  03/27/2025    BMP  06/12/2025    FALL RISK ASSESSMENT  04/03/2026    DIABETES SCREENING  06/12/2027    ADVANCE CARE PLANNING  03/27/2029    DEXA  01/25/2031    HEPATITIS C SCREENING  Completed    PHQ-2 (once per calendar year)  Completed    HPV IMMUNIZATION  Aged Out    MENINGITIS IMMUNIZATION  Aged Out    MAMMO SCREENING  Discontinued    LUNG CANCER SCREENING  Discontinued         Here today for annual wellness visit and medication check.  Continues to work at [employer] 3 times per week.  This summer is going to go down to working just once per week.  Really enjoys cooking, gardening and flowers.  Wants to be able to spend the summer at home enjoying yard and meals with .    Is fasting for labs.  Has been taking medications and tolerating well.  No dizziness or lightheaded.  Was seen by cardiology.  Found to have paroxysmal atrial fibrillation.  Started on Eliquis twice daily.  Cost is $400 per month.  Is hoping to reach deductible soon.  Not currently having any chest pain or palpitations.  No shortness of breath.  Continues to be very active.    Was seen in December for Lump left breast.  Mammogram and ultrasound were ordered.  Lump resolved on own.  Did not have the imaging.    Declines mammogram, colonoscopy, bone density test, pneumonia vaccine, shingles vaccine, Tdap, RSV vaccine, influenza and COVID vaccines.    Has been having more numbness and tingling to feet.  Notices more in the evening and at bedtime on days when has been up on feet a lot.  Wondering about increasing gabapentin.         Objective    Exam  /70   Pulse 59   Temp 97.6  F (36.4  C) (Temporal)   Resp 16   Ht 1.583 m (5' 2.32\")   Wt 61.8 kg (136 lb 3.2 oz)   SpO2 100%   BMI 24.65 kg/m     Estimated " "body mass index is 24.65 kg/m  as calculated from the following:    Height as of this encounter: 1.583 m (5' 2.32\").    Weight as of this encounter: 61.8 kg (136 lb 3.2 oz).    Physical Exam  Constitutional:       Appearance: Normal appearance. She is well-developed.   HENT:      Head: Normocephalic and atraumatic.      Right Ear: Tympanic membrane and external ear normal. No middle ear effusion.      Left Ear: Tympanic membrane and external ear normal.  No middle ear effusion.      Nose: No mucosal edema.   Neck:      Thyroid: No thyromegaly.      Vascular: No carotid bruit.   Cardiovascular:      Rate and Rhythm: Normal rate and regular rhythm.      Heart sounds: Normal heart sounds.   Pulmonary:      Effort: Pulmonary effort is normal.      Breath sounds: Normal breath sounds.   Abdominal:      General: Bowel sounds are normal.      Palpations: Abdomen is soft.   Skin:     General: Skin is warm and dry.   Neurological:      Mental Status: She is alert.   Psychiatric:         Behavior: Behavior normal.             4/3/2025   Mini Cog   Clock Draw Score 2 Normal   3 Item Recall 3 objects recalled   Mini Cog Total Score 5              Signed Electronically by: EMORY Mcgee CNP    "

## 2025-08-11 ENCOUNTER — TELEPHONE (OUTPATIENT)
Dept: FAMILY MEDICINE | Facility: CLINIC | Age: 80
End: 2025-08-11
Payer: COMMERCIAL

## (undated) DEVICE — PAD PERI INDIV WRAP 11" 2022

## (undated) DEVICE — SU VICRYL 3-0 CT-1 36" J944H

## (undated) DEVICE — KIT HAND CONTROL ANGIOTOUCH ACIST 65CM AT-P65

## (undated) DEVICE — NDL SPINAL 22GA 3.5" QUINCKE 405181

## (undated) DEVICE — GLIDEWIRE TERUMO .035X180CM 1.5,, J-TIP GR3525

## (undated) DEVICE — CATH SECURE 5445-3

## (undated) DEVICE — SU VICRYL 3-0 SH 27" UND J416H

## (undated) DEVICE — BLADE KNIFE SURG 15 371115

## (undated) DEVICE — PACK LAPAROSCOPY/PELVISCOPY STD

## (undated) DEVICE — SPONGE PACK VAGINAL 4X36"

## (undated) DEVICE — SU VICRYL 4-0 SH 27" UND J415H

## (undated) DEVICE — DEFIB PRO-PADZ LVP LQD GEL ADULT 8900-2105-01

## (undated) DEVICE — INTRO GLIDESHEATH SLENDER 6FR 10X45CM 60-1060

## (undated) DEVICE — TOTE ANGIO CORP PC15AT SAN32CC83O

## (undated) DEVICE — TUBING CYSTO/BLADDER IRRIG SET 80" 06544-01

## (undated) DEVICE — GUIDEWIRE VASC 0.014INX180CM RUNTHROUGH 25-1011

## (undated) DEVICE — LUBRICATING JELLY 4.25OZ

## (undated) DEVICE — CATH ANGIO INFINITI JL4 4FRX100CM 538420

## (undated) DEVICE — ADHESIVE SWIFTSET 0.8ML OCTYL SS6

## (undated) DEVICE — SLEEVE TR BAND RADIAL COMPRESSION DEVICE 24CM TRB24-REG

## (undated) DEVICE — GOWN IMPERVIOUS SPECIALTY XLG/XLONG 32474

## (undated) DEVICE — SU VICRYL 2-0 CT-2 27" J333H

## (undated) DEVICE — MANIFOLD KIT ANGIO AUTOMATED 014613

## (undated) DEVICE — INTRODUCER GUIDEWIRE 22290

## (undated) DEVICE — WIRE GUIDE 0.035"X260CM SAFE-T-J EXCHANGE G00517

## (undated) DEVICE — CATH ANGIO INFINITI JR4 4FRX100CM 538421

## (undated) DEVICE — TUBING SUCTION MEDI-VAC 1/4"X20' N620A

## (undated) DEVICE — ESU HOLSTER PLASTIC DISP E2400

## (undated) DEVICE — GLOVE PROTEXIS W/NEU-THERA 7.5  2D73TE75

## (undated) DEVICE — SOL WATER IRRIG 1000ML BOTTLE 07139-09

## (undated) DEVICE — CATH TRAY FOLEY SURESTEP 16FR W/URINE MTR STATLK LF A303416A

## (undated) DEVICE — BLADE KNIFE SURG 10 371110

## (undated) DEVICE — SOL NACL 0.9% IRRIG 1000ML BOTTLE 07138-09

## (undated) DEVICE — SUCTION TIP YANKAUER STR K87

## (undated) DEVICE — SYR BULB IRRIG DOVER 60 ML LATEX FREE 67000

## (undated) RX ORDER — DEXAMETHASONE SODIUM PHOSPHATE 4 MG/ML
INJECTION, SOLUTION INTRA-ARTICULAR; INTRALESIONAL; INTRAMUSCULAR; INTRAVENOUS; SOFT TISSUE
Status: DISPENSED
Start: 2019-03-25

## (undated) RX ORDER — LIDOCAINE HYDROCHLORIDE 10 MG/ML
INJECTION, SOLUTION EPIDURAL; INFILTRATION; INTRACAUDAL; PERINEURAL
Status: DISPENSED
Start: 2019-03-25

## (undated) RX ORDER — HEPARIN SODIUM 200 [USP'U]/100ML
INJECTION, SOLUTION INTRAVENOUS
Status: DISPENSED
Start: 2024-06-12

## (undated) RX ORDER — HEPARIN SODIUM 1000 [USP'U]/ML
INJECTION, SOLUTION INTRAVENOUS; SUBCUTANEOUS
Status: DISPENSED
Start: 2024-06-12

## (undated) RX ORDER — VERAPAMIL HYDROCHLORIDE 2.5 MG/ML
INJECTION, SOLUTION INTRAVENOUS
Status: DISPENSED
Start: 2024-06-12

## (undated) RX ORDER — KETOROLAC TROMETHAMINE 15 MG/ML
INJECTION, SOLUTION INTRAMUSCULAR; INTRAVENOUS
Status: DISPENSED
Start: 2019-03-25

## (undated) RX ORDER — FENTANYL CITRATE 50 UG/ML
INJECTION, SOLUTION INTRAMUSCULAR; INTRAVENOUS
Status: DISPENSED
Start: 2019-03-25

## (undated) RX ORDER — EPINEPHRINE 1 MG/ML(1)
AMPUL (ML) INJECTION
Status: DISPENSED
Start: 2019-03-25

## (undated) RX ORDER — LIDOCAINE HYDROCHLORIDE 10 MG/ML
INJECTION, SOLUTION EPIDURAL; INFILTRATION; INTRACAUDAL; PERINEURAL
Status: DISPENSED
Start: 2024-06-12

## (undated) RX ORDER — NITROGLYCERIN 5 MG/ML
VIAL (ML) INTRAVENOUS
Status: DISPENSED
Start: 2024-06-12

## (undated) RX ORDER — CEFAZOLIN SODIUM 2 G/100ML
INJECTION, SOLUTION INTRAVENOUS
Status: DISPENSED
Start: 2019-03-25

## (undated) RX ORDER — BUPIVACAINE HYDROCHLORIDE 7.5 MG/ML
INJECTION, SOLUTION INTRASPINAL
Status: DISPENSED
Start: 2019-03-25

## (undated) RX ORDER — ONDANSETRON 2 MG/ML
INJECTION INTRAMUSCULAR; INTRAVENOUS
Status: DISPENSED
Start: 2019-03-25

## (undated) RX ORDER — PHENAZOPYRIDINE HYDROCHLORIDE 200 MG/1
TABLET, FILM COATED ORAL
Status: DISPENSED
Start: 2019-03-25

## (undated) RX ORDER — FENTANYL CITRATE 50 UG/ML
INJECTION, SOLUTION INTRAMUSCULAR; INTRAVENOUS
Status: DISPENSED
Start: 2024-06-12

## (undated) RX ORDER — PROPOFOL 10 MG/ML
INJECTION, EMULSION INTRAVENOUS
Status: DISPENSED
Start: 2019-03-25

## (undated) RX ORDER — ATROPA BELLADONNA AND OPIUM 16.2; 6 MG/1; MG/1
SUPPOSITORY RECTAL
Status: DISPENSED
Start: 2019-03-25

## (undated) RX ORDER — EPHEDRINE SULFATE 50 MG/ML
INJECTION, SOLUTION INTRAMUSCULAR; INTRAVENOUS; SUBCUTANEOUS
Status: DISPENSED
Start: 2019-03-25